# Patient Record
Sex: MALE | Race: WHITE | NOT HISPANIC OR LATINO | Employment: FULL TIME | URBAN - METROPOLITAN AREA
[De-identification: names, ages, dates, MRNs, and addresses within clinical notes are randomized per-mention and may not be internally consistent; named-entity substitution may affect disease eponyms.]

---

## 2022-08-22 ENCOUNTER — OFFICE VISIT (OUTPATIENT)
Dept: URGENT CARE | Facility: PHYSICIAN GROUP | Age: 52
End: 2022-08-22
Payer: COMMERCIAL

## 2022-08-22 VITALS
HEIGHT: 66 IN | SYSTOLIC BLOOD PRESSURE: 106 MMHG | BODY MASS INDEX: 21.21 KG/M2 | OXYGEN SATURATION: 96 % | TEMPERATURE: 97.9 F | HEART RATE: 74 BPM | RESPIRATION RATE: 16 BRPM | WEIGHT: 132 LBS | DIASTOLIC BLOOD PRESSURE: 70 MMHG

## 2022-08-22 DIAGNOSIS — S39.012A ACUTE MYOFASCIAL STRAIN OF LUMBAR REGION, INITIAL ENCOUNTER: ICD-10-CM

## 2022-08-22 DIAGNOSIS — M54.50 LUMBAR PAIN: ICD-10-CM

## 2022-08-22 DIAGNOSIS — R31.9 HEMATURIA, UNSPECIFIED TYPE: ICD-10-CM

## 2022-08-22 LAB
APPEARANCE UR: CLEAR
BILIRUB UR STRIP-MCNC: NORMAL MG/DL
COLOR UR AUTO: NORMAL
GLUCOSE UR STRIP.AUTO-MCNC: NEGATIVE MG/DL
KETONES UR STRIP.AUTO-MCNC: 15 MG/DL
LEUKOCYTE ESTERASE UR QL STRIP.AUTO: NEGATIVE
NITRITE UR QL STRIP.AUTO: NEGATIVE
PH UR STRIP.AUTO: 6 [PH] (ref 5–8)
PROT UR QL STRIP: 100 MG/DL
RBC UR QL AUTO: NORMAL
SP GR UR STRIP.AUTO: 1.02
UROBILINOGEN UR STRIP-MCNC: 4 MG/DL

## 2022-08-22 PROCEDURE — 99204 OFFICE O/P NEW MOD 45 MIN: CPT | Performed by: PHYSICIAN ASSISTANT

## 2022-08-22 PROCEDURE — 81002 URINALYSIS NONAUTO W/O SCOPE: CPT | Performed by: PHYSICIAN ASSISTANT

## 2022-08-22 RX ORDER — CYCLOBENZAPRINE HCL 10 MG
10 TABLET ORAL 3 TIMES DAILY PRN
Qty: 30 TABLET | Refills: 0 | Status: SHIPPED | OUTPATIENT
Start: 2022-08-22 | End: 2022-09-20

## 2022-08-22 RX ORDER — KETOROLAC TROMETHAMINE 30 MG/ML
30 INJECTION, SOLUTION INTRAMUSCULAR; INTRAVENOUS ONCE
Status: COMPLETED | OUTPATIENT
Start: 2022-08-22 | End: 2022-08-22

## 2022-08-22 RX ORDER — PREDNISONE 10 MG/1
30 TABLET ORAL 2 TIMES DAILY
Qty: 30 TABLET | Refills: 0 | Status: SHIPPED | OUTPATIENT
Start: 2022-08-22 | End: 2022-08-27

## 2022-08-22 RX ADMIN — KETOROLAC TROMETHAMINE 30 MG: 30 INJECTION, SOLUTION INTRAMUSCULAR; INTRAVENOUS at 10:15

## 2022-08-22 ASSESSMENT — ENCOUNTER SYMPTOMS
RESPIRATORY NEGATIVE: 1
BACK PAIN: 1
NEUROLOGICAL NEGATIVE: 1
CONSTITUTIONAL NEGATIVE: 1
CARDIOVASCULAR NEGATIVE: 1
GASTROINTESTINAL NEGATIVE: 1

## 2022-08-22 NOTE — PROGRESS NOTES
"  Subjective:     Yonis Ward  is a 52 y.o. male who presents for Spasms (Back pain since 1 wk )       He presents today with low back pain x1 week.  Initially he was experiencing lumbar radiculopathy but his radicular symptoms have resolved after visiting the chiropractor; his low back pain has continued.  He presents today using a walking stick for ambulatory assistance.  He denies loss of bowel or bladder function.  Denies any specific injury or trauma associated with symptom onset.  He has been using over-the-counter Tylenol for his symptoms.  No previous history of lumbar pain or injury.  No urinary symptoms, flank pain, gross hematuria.  No numbness and tingling or loss of lower extremity muscle strength.     Review of Systems   Constitutional: Negative.    Respiratory: Negative.     Cardiovascular: Negative.    Gastrointestinal: Negative.    Genitourinary: Negative.    Musculoskeletal:  Positive for back pain.   Neurological: Negative.     No Known Allergies  History reviewed. No pertinent past medical history.     Objective:   /70 (BP Location: Left arm, Patient Position: Sitting, BP Cuff Size: Adult)   Pulse 74   Temp 36.6 °C (97.9 °F) (Temporal)   Resp 16   Ht 1.676 m (5' 6\")   Wt 59.9 kg (132 lb)   SpO2 96%   BMI 21.31 kg/m²   Physical Exam  Vitals and nursing note reviewed.   Constitutional:       General: He is not in acute distress.     Appearance: He is not ill-appearing or toxic-appearing.   HENT:      Head: Normocephalic.      Nose: No rhinorrhea.   Eyes:      General: No scleral icterus.     Conjunctiva/sclera: Conjunctivae normal.   Pulmonary:      Effort: Pulmonary effort is normal. No respiratory distress.      Breath sounds: No stridor.   Musculoskeletal:      Cervical back: Neck supple.      Comments: Tenderness to palpation over the bilateral SI joints, tenderness over the bilateral lumbar paraspinal musculature.  No bony step-offs or deformities.  Positive straight leg " raise, likely due to severe low back pain rather than disc pathology.  Lower extremity dermatomal sensation intact.  Lower extremity strength 4/5 overall muscles.   Neurological:      Mental Status: He is alert and oriented to person, place, and time.   Psychiatric:         Mood and Affect: Mood normal.         Behavior: Behavior normal.         Thought Content: Thought content normal.         Judgment: Judgment normal.           Diagnostic testing:    Urinalysis-large bilirubin, positive ketones, trace blood, positive protein, positive urobilirubin, all others within normal limits    Assessment/Plan:     Encounter Diagnoses   Name Primary?    Lumbar pain     Acute myofascial strain of lumbar region, initial encounter     Hematuria, unspecified type           Plan for care for today's complaint includes 30 mg Toradol injection in office today.  Prednisone, Flexeril.  He should continue to monitor symptoms return to the urgent care follow-up emergency department if symptoms worsen, will refer to primary care for further evaluation management of his acute low back pain and hematuria seen on urinalysis..  Prescription for prednisone, Flexeril provided.    See AVS Instructions below for written guidance provided to patient on after-visit management and care in addition to our verbal discussion during the visit.    Please note that this dictation was created using voice recognition software. I have attempted to correct all errors, but there may be sound-alike, spelling, grammar and possibly content errors that I did not discover before finalizing the note.    Dhruv Medina PA-C

## 2022-08-29 ENCOUNTER — OFFICE VISIT (OUTPATIENT)
Dept: URGENT CARE | Facility: PHYSICIAN GROUP | Age: 52
End: 2022-08-29
Payer: COMMERCIAL

## 2022-08-29 VITALS
HEART RATE: 85 BPM | HEIGHT: 66 IN | DIASTOLIC BLOOD PRESSURE: 60 MMHG | TEMPERATURE: 98 F | SYSTOLIC BLOOD PRESSURE: 112 MMHG | BODY MASS INDEX: 21.69 KG/M2 | WEIGHT: 135 LBS | RESPIRATION RATE: 16 BRPM | OXYGEN SATURATION: 99 %

## 2022-08-29 DIAGNOSIS — M70.21 OLECRANON BURSITIS OF RIGHT ELBOW: ICD-10-CM

## 2022-08-29 DIAGNOSIS — M54.50 LUMBAR PAIN: ICD-10-CM

## 2022-08-29 PROCEDURE — 99214 OFFICE O/P EST MOD 30 MIN: CPT | Performed by: PHYSICIAN ASSISTANT

## 2022-08-29 RX ORDER — IBUPROFEN 200 MG
200 TABLET ORAL EVERY 6 HOURS PRN
COMMUNITY
End: 2022-10-09

## 2022-08-29 RX ORDER — CEPHALEXIN 500 MG/1
500 CAPSULE ORAL 4 TIMES DAILY
Qty: 28 CAPSULE | Refills: 0 | Status: SHIPPED | OUTPATIENT
Start: 2022-08-29 | End: 2022-09-05

## 2022-08-29 RX ORDER — METHYLPREDNISOLONE 4 MG/1
TABLET ORAL
Qty: 21 TABLET | Refills: 0 | Status: SHIPPED | OUTPATIENT
Start: 2022-08-29 | End: 2022-09-20

## 2022-08-29 ASSESSMENT — ENCOUNTER SYMPTOMS
CHILLS: 0
FEVER: 0
PALPITATIONS: 0
SHORTNESS OF BREATH: 0

## 2022-08-29 NOTE — PROGRESS NOTES
"  Subjective:   Yonis Ward is a 52 y.o. male who presents today with   Chief Complaint   Patient presents with    Arm Swelling     On (R) arm x 1 day     Arm Pain   The incident occurred 12 to 24 hours ago. There was no injury mechanism. The pain is present in the right elbow. The quality of the pain is described as aching. The pain is moderate. Pertinent negatives include no chest pain. He has tried nothing for the symptoms. The treatment provided no relief.   No recent injury or trauma.  Patient states his back has also been bothering him and seemed to have some recurrence after being treated initially 1 week ago.  He denies any significant previous back injuries besides strains and denies any sciatica at this time or other red flag signs.    PMH:  has no past medical history on file.  MEDS:   Current Outpatient Medications:     ibuprofen (MOTRIN) 200 MG Tab, Take 200 mg by mouth every 6 hours as needed., Disp: , Rfl:     cephALEXin (KEFLEX) 500 MG Cap, Take 1 Capsule by mouth 4 times a day for 7 days., Disp: 28 Capsule, Rfl: 0    cyclobenzaprine (FLEXERIL) 10 mg Tab, Take 1 Tablet by mouth 3 times a day as needed for Muscle Spasms. (Patient not taking: Reported on 8/29/2022), Disp: 30 Tablet, Rfl: 0  ALLERGIES: No Known Allergies  SURGHX: No past surgical history on file.  SOCHX:  reports that he has never smoked. He has never used smokeless tobacco. He reports that he does not currently use alcohol. He reports that he does not use drugs.  FH: Reviewed with patient, not pertinent to this visit.     Review of Systems   Constitutional:  Negative for chills and fever.   Respiratory:  Negative for shortness of breath.    Cardiovascular:  Negative for chest pain and palpitations.   Musculoskeletal:         Right elbow pain, swelling      Objective:   /60 (BP Location: Left arm, Patient Position: Sitting, BP Cuff Size: Adult)   Pulse 85   Temp 36.7 °C (98 °F) (Temporal)   Resp 16   Ht 1.676 m (5' 6\") "   Wt 61.2 kg (135 lb)   SpO2 99%   BMI 21.79 kg/m²   Physical Exam  Vitals and nursing note reviewed.   Constitutional:       General: He is not in acute distress.     Appearance: Normal appearance. He is well-developed. He is not ill-appearing or toxic-appearing.   HENT:      Head: Normocephalic and atraumatic.      Right Ear: Hearing normal.      Left Ear: Hearing normal.   Cardiovascular:      Rate and Rhythm: Normal rate and regular rhythm.      Heart sounds: Normal heart sounds.   Pulmonary:      Effort: Pulmonary effort is normal.   Musculoskeletal:        Arms:         Back:       Comments: No bony tenderness to palpation of the right elbow.  Limited range of motion with flexion secondary to swelling.  Patient has full extension of the right elbow.  Neurovascular intact distally to the right upper extremity.  No tenderness to palpation or step-off deformity appreciated to the lumbar area but patient notes that his pain is to the mid/right lumbar area decreased range of motion of flexion and twisting at the hips.   Skin:     General: Skin is warm and dry.   Neurological:      Mental Status: He is alert.      Coordination: Coordination normal.   Psychiatric:         Mood and Affect: Mood normal.     Assessment/Plan:   Assessment    1. Olecranon bursitis of right elbow  - Referral to Sports Medicine  - cephALEXin (KEFLEX) 500 MG Cap; Take 1 Capsule by mouth 4 times a day for 7 days.  Dispense: 28 Capsule; Refill: 0    2. Lumbar pain  - Referral to Sports Medicine    Other orders  - ibuprofen (MOTRIN) 200 MG Tab; Take 200 mg by mouth every 6 hours as needed.  Symptoms and presentation are consistent with bursitis of the right elbow and given erythema that is present would recommend treating with antibiotics as well in case of any superficial infection.  Did discuss close monitoring and ER precautions that if there is any new worsening of redness or swelling or streaking from the area I would recommend going to  the ER at that time.  Recommend patient follow-up with sports medicine regarding his lower back pain and possibly needing physical therapy and other treatment.  Patient was provided with an Ace wrap that I recommend he use on and off over the next several days to help with swelling and also suggest that he use ice or heat to the area as well.    Differential diagnosis, natural history, supportive care, and indications for immediate follow-up discussed.   Patient given instructions and understanding of medications and treatment.    If not improving in 3-5 days, F/U with PCP or return to  if symptoms worsen.    Patient agreeable to plan.    Please note that this dictation was created using voice recognition software. I have made every reasonable attempt to correct obvious errors, but I expect that there are errors of grammar and possibly content that I did not discover before finalizing the note.    Aurelio Anthony PA-C

## 2022-09-20 ENCOUNTER — OFFICE VISIT (OUTPATIENT)
Dept: MEDICAL GROUP | Facility: PHYSICIAN GROUP | Age: 52
End: 2022-09-20
Attending: PHYSICIAN ASSISTANT
Payer: COMMERCIAL

## 2022-09-20 VITALS
TEMPERATURE: 99.1 F | SYSTOLIC BLOOD PRESSURE: 136 MMHG | BODY MASS INDEX: 20.83 KG/M2 | WEIGHT: 129.6 LBS | OXYGEN SATURATION: 98 % | RESPIRATION RATE: 18 BRPM | HEART RATE: 73 BPM | HEIGHT: 66 IN | DIASTOLIC BLOOD PRESSURE: 78 MMHG

## 2022-09-20 DIAGNOSIS — M62.830 BACK SPASM: ICD-10-CM

## 2022-09-20 PROBLEM — Z00.00 WELLNESS EXAMINATION: Status: ACTIVE | Noted: 2022-09-20

## 2022-09-20 PROCEDURE — 99203 OFFICE O/P NEW LOW 30 MIN: CPT | Performed by: FAMILY MEDICINE

## 2022-09-20 RX ORDER — CYCLOBENZAPRINE HCL 5 MG
5-10 TABLET ORAL 3 TIMES DAILY PRN
Qty: 30 TABLET | Refills: 0 | Status: SHIPPED | OUTPATIENT
Start: 2022-09-20 | End: 2022-10-09

## 2022-09-20 RX ORDER — CYCLOBENZAPRINE HCL 5 MG
5-10 TABLET ORAL 3 TIMES DAILY PRN
Qty: 30 TABLET | Refills: 0 | Status: SHIPPED | OUTPATIENT
Start: 2022-09-20 | End: 2022-09-20

## 2022-09-20 RX ORDER — NAPROXEN 500 MG/1
500 TABLET ORAL 2 TIMES DAILY WITH MEALS
Qty: 60 TABLET | Refills: 1 | Status: SHIPPED | OUTPATIENT
Start: 2022-09-20 | End: 2022-10-09

## 2022-09-20 SDOH — ECONOMIC STABILITY: TRANSPORTATION INSECURITY
IN THE PAST 12 MONTHS, HAS THE LACK OF TRANSPORTATION KEPT YOU FROM MEDICAL APPOINTMENTS OR FROM GETTING MEDICATIONS?: NO

## 2022-09-20 SDOH — ECONOMIC STABILITY: HOUSING INSECURITY
IN THE LAST 12 MONTHS, WAS THERE A TIME WHEN YOU DID NOT HAVE A STEADY PLACE TO SLEEP OR SLEPT IN A SHELTER (INCLUDING NOW)?: YES

## 2022-09-20 SDOH — HEALTH STABILITY: PHYSICAL HEALTH: ON AVERAGE, HOW MANY DAYS PER WEEK DO YOU ENGAGE IN MODERATE TO STRENUOUS EXERCISE (LIKE A BRISK WALK)?: 5 DAYS

## 2022-09-20 SDOH — ECONOMIC STABILITY: HOUSING INSECURITY: IN THE LAST 12 MONTHS, HOW MANY PLACES HAVE YOU LIVED?: 1

## 2022-09-20 SDOH — ECONOMIC STABILITY: TRANSPORTATION INSECURITY
IN THE PAST 12 MONTHS, HAS LACK OF TRANSPORTATION KEPT YOU FROM MEETINGS, WORK, OR FROM GETTING THINGS NEEDED FOR DAILY LIVING?: NO

## 2022-09-20 SDOH — ECONOMIC STABILITY: INCOME INSECURITY: HOW HARD IS IT FOR YOU TO PAY FOR THE VERY BASICS LIKE FOOD, HOUSING, MEDICAL CARE, AND HEATING?: NOT VERY HARD

## 2022-09-20 SDOH — ECONOMIC STABILITY: HOUSING INSECURITY
IN THE LAST 12 MONTHS, WAS THERE A TIME WHEN YOU DID NOT HAVE A STEADY PLACE TO SLEEP OR SLEPT IN A SHELTER (INCLUDING NOW)?: NO

## 2022-09-20 SDOH — ECONOMIC STABILITY: FOOD INSECURITY: WITHIN THE PAST 12 MONTHS, YOU WORRIED THAT YOUR FOOD WOULD RUN OUT BEFORE YOU GOT MONEY TO BUY MORE.: NEVER TRUE

## 2022-09-20 SDOH — ECONOMIC STABILITY: INCOME INSECURITY: IN THE LAST 12 MONTHS, WAS THERE A TIME WHEN YOU WERE NOT ABLE TO PAY THE MORTGAGE OR RENT ON TIME?: NO

## 2022-09-20 SDOH — ECONOMIC STABILITY: TRANSPORTATION INSECURITY
IN THE PAST 12 MONTHS, HAS LACK OF RELIABLE TRANSPORTATION KEPT YOU FROM MEDICAL APPOINTMENTS, MEETINGS, WORK OR FROM GETTING THINGS NEEDED FOR DAILY LIVING?: NO

## 2022-09-20 SDOH — ECONOMIC STABILITY: FOOD INSECURITY: WITHIN THE PAST 12 MONTHS, THE FOOD YOU BOUGHT JUST DIDN'T LAST AND YOU DIDN'T HAVE MONEY TO GET MORE.: NEVER TRUE

## 2022-09-20 SDOH — HEALTH STABILITY: PHYSICAL HEALTH: ON AVERAGE, HOW MANY MINUTES DO YOU ENGAGE IN EXERCISE AT THIS LEVEL?: 60 MIN

## 2022-09-20 SDOH — HEALTH STABILITY: MENTAL HEALTH
STRESS IS WHEN SOMEONE FEELS TENSE, NERVOUS, ANXIOUS, OR CAN'T SLEEP AT NIGHT BECAUSE THEIR MIND IS TROUBLED. HOW STRESSED ARE YOU?: TO SOME EXTENT

## 2022-09-20 ASSESSMENT — SOCIAL DETERMINANTS OF HEALTH (SDOH)
HOW HARD IS IT FOR YOU TO PAY FOR THE VERY BASICS LIKE FOOD, HOUSING, MEDICAL CARE, AND HEATING?: NOT VERY HARD
HOW OFTEN DO YOU GET TOGETHER WITH FRIENDS OR RELATIVES?: TWICE A WEEK
HOW OFTEN DO YOU HAVE A DRINK CONTAINING ALCOHOL: MONTHLY OR LESS
WITHIN THE PAST 12 MONTHS, YOU WORRIED THAT YOUR FOOD WOULD RUN OUT BEFORE YOU GOT THE MONEY TO BUY MORE: NEVER TRUE
HOW OFTEN DO YOU ATTENT MEETINGS OF THE CLUB OR ORGANIZATION YOU BELONG TO?: NEVER
IN A TYPICAL WEEK, HOW MANY TIMES DO YOU TALK ON THE PHONE WITH FAMILY, FRIENDS, OR NEIGHBORS?: THREE TIMES A WEEK
HOW OFTEN DO YOU ATTEND CHURCH OR RELIGIOUS SERVICES?: MORE THAN 4 TIMES PER YEAR
HOW OFTEN DO YOU GET TOGETHER WITH FRIENDS OR RELATIVES?: TWICE A WEEK
HOW MANY DRINKS CONTAINING ALCOHOL DO YOU HAVE ON A TYPICAL DAY WHEN YOU ARE DRINKING: 1 OR 2
IN A TYPICAL WEEK, HOW MANY TIMES DO YOU TALK ON THE PHONE WITH FAMILY, FRIENDS, OR NEIGHBORS?: THREE TIMES A WEEK
HOW OFTEN DO YOU ATTENT MEETINGS OF THE CLUB OR ORGANIZATION YOU BELONG TO?: NEVER
HOW OFTEN DO YOU ATTEND CHURCH OR RELIGIOUS SERVICES?: MORE THAN 4 TIMES PER YEAR
HOW OFTEN DO YOU HAVE SIX OR MORE DRINKS ON ONE OCCASION: NEVER
DO YOU BELONG TO ANY CLUBS OR ORGANIZATIONS SUCH AS CHURCH GROUPS UNIONS, FRATERNAL OR ATHLETIC GROUPS, OR SCHOOL GROUPS?: NO
DO YOU BELONG TO ANY CLUBS OR ORGANIZATIONS SUCH AS CHURCH GROUPS UNIONS, FRATERNAL OR ATHLETIC GROUPS, OR SCHOOL GROUPS?: NO

## 2022-09-20 ASSESSMENT — LIFESTYLE VARIABLES
HOW OFTEN DO YOU HAVE A DRINK CONTAINING ALCOHOL: MONTHLY OR LESS
AUDIT-C TOTAL SCORE: 1
SKIP TO QUESTIONS 9-10: 1
HOW MANY STANDARD DRINKS CONTAINING ALCOHOL DO YOU HAVE ON A TYPICAL DAY: 1 OR 2
HOW OFTEN DO YOU HAVE SIX OR MORE DRINKS ON ONE OCCASION: NEVER

## 2022-09-20 ASSESSMENT — PATIENT HEALTH QUESTIONNAIRE - PHQ9
SUM OF ALL RESPONSES TO PHQ QUESTIONS 1-9: 9
5. POOR APPETITE OR OVEREATING: 1 - SEVERAL DAYS
CLINICAL INTERPRETATION OF PHQ2 SCORE: 2

## 2022-09-20 NOTE — PROGRESS NOTES
Subjective:     CC: Here to establish care and for low back muscle spasm.    HPI:   Yonis presents today with the following medical concerns:    Back spasm  This is a new problem.  Patient's been having recurrent low back spasms for the last month.  He was seen in urgent care and given a Medrol Dosepak ibuprofen which did not help.  He has not had any recent injury that he knows of he just woke up with the pain.  He did see his chiropractor and that helped for short amount of time.  His mattress is new.  No radicular symptoms.    Although he did say 1 day he had red color to his urine but that has not recurred.  He does state that his urine was very concentrated and just a small amount on that day.    History reviewed. No pertinent past medical history.    Social History     Tobacco Use    Smoking status: Former     Types: Cigarettes     Quit date: 2018     Years since quittin.0    Smokeless tobacco: Never   Vaping Use    Vaping Use: Never used   Substance Use Topics    Alcohol use: Not Currently    Drug use: Never       Current Outpatient Medications Ordered in Epic   Medication Sig Dispense Refill    cyclobenzaprine (FLEXERIL) 5 mg tablet Take 1-2 Tablets by mouth 3 times a day as needed for Muscle Spasms. 30 Tablet 0    naproxen (NAPROSYN) 500 MG Tab Take 1 Tablet by mouth 2 times a day with meals. 60 Tablet 1    ibuprofen (MOTRIN) 200 MG Tab Take 200 mg by mouth every 6 hours as needed.       No current Epic-ordered facility-administered medications on file.       Allergies:  Patient has no known allergies.    Health Maintenance: Completed    ROS:  Gen: no fevers/chills, no changes in weight  Eyes: no changes in vision  ENT: no sore throat, no hearing loss, no bloody nose  Pulm: no sob, no cough  CV: no chest pain, no palpitations  GI: no nausea/vomiting, no diarrhea  : no dysuria  Skin: no rash  Neuro: no headaches, no numbness/tingling  Heme/Lymph: no easy bruising      Objective:       Exam:  BP  "136/78 (BP Location: Right arm, Patient Position: Sitting, BP Cuff Size: Adult)   Pulse 73   Temp 37.3 °C (99.1 °F) (Temporal)   Resp 18   Ht 1.676 m (5' 6\")   Wt 58.8 kg (129 lb 9.6 oz)   SpO2 98%   BMI 20.92 kg/m²  Body mass index is 20.92 kg/m².    Gen: Alert and oriented.  Mild apparent distress from low back pain.  Ext: No clubbing, cyanosis, edema.  Back:   Patient holds his posture very stiff and has increasing pain to his lower back with any range of motion.  He has moderate to severe spasm to the lower paraspinous muscular area.  No radicular symptoms noted.  Gait is normal.  No obvious muscle weakness.        Assessment & Plan:     52 y.o. male with the following -     1. Back spasm  This is a new problem over the last month.  Patient is taken the day off of work today.  We will give him a muscle relaxant and Naprosyn.  Continue heat to the painful area.  If he is not improving in the next few days he is to let me know.    He is also encouraged to come back for a complete physical exam and baseline labs.      Return in about 4 weeks (around 10/18/2022) for annual exam.    Please note that this dictation was created using voice recognition software. I have made every reasonable attempt to correct obvious errors, but I expect that there are errors of grammar and possibly content that I did not discover before finalizing the note.        "

## 2022-09-20 NOTE — ASSESSMENT & PLAN NOTE
This is a new problem.  Patient's been having recurrent low back spasms for the last month.  He was seen in urgent care and given a Medrol Dosepak ibuprofen which did not help.  He has not had any recent injury that he knows of he just woke up with the pain.  He did see his chiropractor and that helped for short amount of time.  His mattress is new.  No radicular symptoms.    Although he did say 1 day he had red color to his urine but that has not recurred.  He does state that his urine was very concentrated and just a small amount on that day.

## 2022-10-09 ENCOUNTER — OFFICE VISIT (OUTPATIENT)
Dept: URGENT CARE | Facility: CLINIC | Age: 52
End: 2022-10-09
Payer: COMMERCIAL

## 2022-10-09 VITALS
RESPIRATION RATE: 16 BRPM | HEART RATE: 75 BPM | SYSTOLIC BLOOD PRESSURE: 150 MMHG | DIASTOLIC BLOOD PRESSURE: 90 MMHG | BODY MASS INDEX: 19.65 KG/M2 | HEIGHT: 66 IN | WEIGHT: 122.3 LBS | OXYGEN SATURATION: 98 % | TEMPERATURE: 98.4 F

## 2022-10-09 DIAGNOSIS — S39.012D STRAIN OF LUMBAR REGION, SUBSEQUENT ENCOUNTER: ICD-10-CM

## 2022-10-09 PROCEDURE — 99214 OFFICE O/P EST MOD 30 MIN: CPT | Mod: 25 | Performed by: STUDENT IN AN ORGANIZED HEALTH CARE EDUCATION/TRAINING PROGRAM

## 2022-10-09 RX ORDER — IBUPROFEN 800 MG/1
800 TABLET ORAL EVERY 8 HOURS PRN
Qty: 30 TABLET | Refills: 0 | Status: SHIPPED | OUTPATIENT
Start: 2022-10-09 | End: 2023-01-15

## 2022-10-09 RX ORDER — LIDOCAINE 4 G/G
PATCH TOPICAL
Qty: 15 PATCH | Refills: 0 | Status: ON HOLD | OUTPATIENT
Start: 2022-10-09 | End: 2022-10-26

## 2022-10-09 RX ORDER — METHOCARBAMOL 500 MG/1
TABLET, FILM COATED ORAL
Qty: 90 TABLET | Refills: 0 | Status: SHIPPED | OUTPATIENT
Start: 2022-10-09 | End: 2022-11-20

## 2022-10-09 RX ORDER — KETOROLAC TROMETHAMINE 30 MG/ML
30 INJECTION, SOLUTION INTRAMUSCULAR; INTRAVENOUS ONCE
Status: COMPLETED | OUTPATIENT
Start: 2022-10-09 | End: 2022-10-09

## 2022-10-09 RX ADMIN — KETOROLAC TROMETHAMINE 30 MG: 30 INJECTION, SOLUTION INTRAMUSCULAR; INTRAVENOUS at 11:30

## 2022-10-09 ASSESSMENT — PAIN SCALES - GENERAL: PAINLEVEL: 8=MODERATE-SEVERE PAIN

## 2022-10-09 NOTE — PROGRESS NOTES
Subjective:   CHIEF COMPLAINT  Chief Complaint   Patient presents with    Back Pain     Lower back X 1 am today; back issues since 2022       HPI  Yonis Ward is a 52 y.o. male who presents with a chief complaint of bilateral low back pain.  This is been a chronic issue since August, 2 months ago.  No specific trauma or inciting incident.  Symptoms have been intermittent however slowly worsened 2 days ago.  Then patient reports he woke up around 1:00 this morning with worsening back pain.  Right side is worse than his left.  Believes sleeping on a stiff bed is worsening his symptoms.  Symptoms are constant aggravated with any range of motion.  He has tried hot baths, ibuprofen, naproxen, cold packs which have not helped.  He has tried Flexeril, uncertain if this is provided symptomatic relief but does help him sleep throughout the night.  He was given a shot of Toradol 2 months ago which did help.  Denies experiencing any radiculopathy.  No spontaneous loss of bowels or bladder.  No saddle anesthesia.  No hematuria or dysuria.  Positive ROS for unintended weight loss.  Patient says he has had a diminished appetite due to the back pain.    REVIEW OF SYSTEMS  General: no fever or chills  GI: no nausea or vomiting  See HPI for further details.    PAST MEDICAL HISTORY       SURGICAL HISTORY  patient denies any surgical history    ALLERGIES  No Known Allergies    CURRENT MEDICATIONS  Home Medications       Reviewed by Huang Garcia D.O. (Physician) on 10/09/22 at 1105  Med List Status: <None>     Medication Last Dose Status   cyclobenzaprine (FLEXERIL) 5 mg tablet PRN Active   ibuprofen (MOTRIN) 200 MG Tab PRN Active   naproxen (NAPROSYN) 500 MG Tab PRN Active                    SOCIAL HISTORY  Social History     Tobacco Use    Smoking status: Former     Types: Cigarettes     Quit date: 2018     Years since quittin.0    Smokeless tobacco: Never   Vaping Use    Vaping Use: Never used  "  Substance and Sexual Activity    Alcohol use: Not Currently    Drug use: Never    Sexual activity: Not Currently       FAMILY HISTORY  Family History   Problem Relation Age of Onset    Hypertension Mother     Heart Disease Father           Objective:   PHYSICAL EXAM  VITAL SIGNS: BP (!) 150/90 (BP Location: Left arm, Patient Position: Sitting, BP Cuff Size: Adult)   Pulse 75   Temp 36.9 °C (98.4 °F) (Temporal)   Resp 16   Ht 1.676 m (5' 6\")   Wt 55.5 kg (122 lb 4.8 oz)   SpO2 98%   BMI 19.74 kg/m²     Gen: no acute distress, normal voice  Psych: normal affect, normal judgement, alert, awake  Skin: dry, intact, moist mucosal membranes  Lungs: CTAB w/ symmetric expansion  CV: RRR w/o murmurs or clicks  MSK: Lumbar spine: Antalgic gait.  Slow to get up from a seated position with moderate discernible discomfort.  Able to stand on heels and toes.  Mild TTP along the right SI joint; no additional TTP or midline TTP.  Distal sensation and motor fully intact.    Assessment/Plan:     1. Strain of lumbar region, subsequent encounter  ketorolac (TORADOL) injection 30 mg    ibuprofen (MOTRIN) 800 MG Tab    Lidocaine 4 % Patch    methocarbamol (ROBAXIN) 500 MG Tab      Chronic issue with acute exacerbation.  Poorly controlled.  Patient does report he has been experiencing unintended weight loss but attributes this due to poor appetite secondary to pain.  - Toradol 30 mg IM x1 given the clinic.  No additional NSAIDs for 8 hours  - Ordered Rx for ibuprofen 800 mg 3 times daily as needed.  Instructed patient to not take any additional NSAIDs with this medication  - Tylenol/acetaminophen for additional symptomatic relief  - Ordered Rx for topical lidocaine patch  - Ordered Rx for Robaxin  - Provided a handout with home stretches and exercises which I personally reviewed and discussed with the patient.  - Instructed the patient to follow-up with his PCP.  Encourage patient to discuss getting referral to formal physical " therapy.  Additionally he was instructed to discuss the unintended weight loss with his primary care physician to determine if further eval is warranted.     Differential diagnosis, natural history, supportive care, and indications for immediate follow-up discussed. All questions answered. Patient agrees with the plan of care.    Follow-up as needed if symptoms worsen or fail to improve to PCP, Urgent care or Emergency Room.    Please note that this dictation was created using voice recognition software. I have made a reasonable attempt to correct obvious errors, but I expect that there are errors of grammar and possibly content that I did not discover before finalizing the note.

## 2022-10-11 ENCOUNTER — TELEMEDICINE (OUTPATIENT)
Dept: TELEHEALTH | Facility: TELEMEDICINE | Age: 52
End: 2022-10-11
Payer: COMMERCIAL

## 2022-10-11 VITALS — HEIGHT: 66 IN | BODY MASS INDEX: 20.09 KG/M2 | WEIGHT: 125 LBS

## 2022-10-11 DIAGNOSIS — M54.50 ACUTE LOW BACK PAIN WITHOUT SCIATICA, UNSPECIFIED BACK PAIN LATERALITY: ICD-10-CM

## 2022-10-11 PROCEDURE — 99213 OFFICE O/P EST LOW 20 MIN: CPT | Mod: 95 | Performed by: PHYSICIAN ASSISTANT

## 2022-10-11 RX ORDER — CYCLOBENZAPRINE HCL 10 MG
10 TABLET ORAL 3 TIMES DAILY PRN
Qty: 30 TABLET | Refills: 0 | Status: SHIPPED | OUTPATIENT
Start: 2022-10-11 | End: 2022-11-01

## 2022-10-11 RX ORDER — TRAMADOL HYDROCHLORIDE 50 MG/1
50 TABLET ORAL EVERY 6 HOURS PRN
Qty: 16 TABLET | Refills: 0 | Status: SHIPPED | OUTPATIENT
Start: 2022-10-11 | End: 2022-10-15

## 2022-10-11 RX ORDER — METHYLPREDNISOLONE 4 MG/1
TABLET ORAL
Qty: 21 TABLET | Refills: 0 | Status: SHIPPED | OUTPATIENT
Start: 2022-10-11 | End: 2022-11-01

## 2022-10-12 NOTE — PROGRESS NOTES
Virtual Visit: Established Patient   This visit was conducted via Zoom using secure and encrypted videoconferencing technology.   The patient was in their home in the state of [place].    The patient's identity was confirmed and verbal consent was obtained for this virtual visit.    Subjective:   CC:   Chief Complaint   Patient presents with    Back Pain     Having inflammation in back. Came in to  10/9 for this.     Yonis Ward is a 52 y.o. male presenting for evaluation and management of: Acute on chronic low back pain    Patient is seen via telemedicine with complaints of severe low back pain.  Patient reports he went to the chiropractor last night.  He awoke with severe back pain around midnight.  Pain is located to the right side of the low back pain.  No radiating leg pain, numbness, or tingling.  Works as a [employer] , does not recall a specific event.  No loss of control or b/b.  No saddle anesthesia.  States he can't get OOB due to pain.  Review of prior notes indicate that he has been seen for low back pain in the past.  He had a similar episode in September and also 1 in August.  ROS       Current medicines (including changes today)  Current Outpatient Medications   Medication Sig Dispense Refill    methylPREDNISolone (MEDROL DOSEPAK) 4 MG Tablet Therapy Pack Follow schedule on package instructions. 21 Tablet 0    cyclobenzaprine (FLEXERIL) 10 mg Tab Take 1 Tablet by mouth 3 times a day as needed for Muscle Spasms. 30 Tablet 0    traMADol (ULTRAM) 50 MG Tab Take 1 Tablet by mouth every 6 hours as needed for Severe Pain for up to 4 days. 16 Tablet 0    ibuprofen (MOTRIN) 800 MG Tab Take 1 Tablet by mouth every 8 hours as needed for Moderate Pain. 30 Tablet 0    Lidocaine 4 % Patch Apply patch to painful area. Patch may remain in place for up to 12 hours in any 24-hour period. No more than 1 patch can be used in a 24-hour period. 15 Patch 0    methocarbamol (ROBAXIN) 500 MG Tab Take 1 to 2  "tablets every 6 hours as needed for pain/muscle spasms. 90 Tablet 0     No current facility-administered medications for this visit.       Patient Active Problem List    Diagnosis Date Noted    Wellness examination 09/20/2022    Back spasm 09/20/2022        Objective:   Ht 1.676 m (5' 6\")   Wt 56.7 kg (125 lb)   BMI 20.18 kg/m²     Physical Exam:    Constitutional: Alert, obvious distress, lying in bed  Skin: No rashes in visible areas.  Eye: Round. Conjunctiva clear, lids normal. No icterus.   ENMT: Lips pink without lesions, good dentition, moist mucous membranes. Phonation normal.  Neck: No masses, no thyromegaly. Moves freely without pain.  Respiratory: Unlabored respiratory effort, no cough or audible wheeze  Psych: Alert and oriented x3, normal affect and mood.     Assessment and Plan:   The following treatment plan was discussed:     1. Acute low back pain without sciatica, unspecified back pain laterality  - methylPREDNISolone (MEDROL DOSEPAK) 4 MG Tablet Therapy Pack; Follow schedule on package instructions.  Dispense: 21 Tablet; Refill: 0  - cyclobenzaprine (FLEXERIL) 10 mg Tab; Take 1 Tablet by mouth 3 times a day as needed for Muscle Spasms.  Dispense: 30 Tablet; Refill: 0  - Referral to Spine Surgery  - traMADol (ULTRAM) 50 MG Tab; Take 1 Tablet by mouth every 6 hours as needed for Severe Pain for up to 4 days.  Dispense: 16 Tablet; Refill: 0    Follow-up: No follow-ups on file.  Patient with episodic acute on chronic low back pain  No radicular qualities, no numbness tingling weakness or bowel or bladder dysfunction  Flexeril for muscle spasms  Medrol Dosepak  May continue taking ibuprofen 1 Medrol Dosepak completed  Tramadol for severe breakthrough pain  Referral placed to spine surgery/physiatry for further work-up, patient has not had any imaging of his spine in the past.  He may benefit from physical therapy.  Return precautions discussed, currently no red flags that are obvious on telemedicine " visit

## 2022-10-19 ENCOUNTER — APPOINTMENT (OUTPATIENT)
Dept: RADIOLOGY | Facility: MEDICAL CENTER | Age: 52
DRG: 477 | End: 2022-10-19
Attending: EMERGENCY MEDICINE
Payer: COMMERCIAL

## 2022-10-19 ENCOUNTER — HOSPITAL ENCOUNTER (INPATIENT)
Facility: MEDICAL CENTER | Age: 52
LOS: 7 days | DRG: 477 | End: 2022-10-26
Attending: EMERGENCY MEDICINE | Admitting: STUDENT IN AN ORGANIZED HEALTH CARE EDUCATION/TRAINING PROGRAM
Payer: COMMERCIAL

## 2022-10-19 DIAGNOSIS — I10 HYPERTENSION, UNSPECIFIED TYPE: ICD-10-CM

## 2022-10-19 DIAGNOSIS — R63.4 WEIGHT LOSS: ICD-10-CM

## 2022-10-19 DIAGNOSIS — M46.20 OSTEOMYELITIS OF VERTEBRA (HCC): ICD-10-CM

## 2022-10-19 DIAGNOSIS — M54.50 LUMBAR BACK PAIN: ICD-10-CM

## 2022-10-19 PROBLEM — D64.9 ANEMIA: Status: ACTIVE | Noted: 2022-10-19

## 2022-10-19 PROBLEM — M54.9 INTRACTABLE BACK PAIN: Status: ACTIVE | Noted: 2022-10-19

## 2022-10-19 PROBLEM — R03.0 ELEVATED BLOOD-PRESSURE READING WITHOUT DIAGNOSIS OF HYPERTENSION: Status: ACTIVE | Noted: 2022-10-19

## 2022-10-19 LAB
ALBUMIN SERPL BCP-MCNC: 3.7 G/DL (ref 3.2–4.9)
ALBUMIN/GLOB SERPL: 0.9 G/DL
ALP SERPL-CCNC: 81 U/L (ref 30–99)
ALT SERPL-CCNC: 10 U/L (ref 2–50)
ANION GAP SERPL CALC-SCNC: 12 MMOL/L (ref 7–16)
AST SERPL-CCNC: 16 U/L (ref 12–45)
BASOPHILS # BLD AUTO: 0.2 % (ref 0–1.8)
BASOPHILS # BLD: 0.02 K/UL (ref 0–0.12)
BILIRUB SERPL-MCNC: 0.6 MG/DL (ref 0.1–1.5)
BUN SERPL-MCNC: 13 MG/DL (ref 8–22)
CALCIUM SERPL-MCNC: 8.9 MG/DL (ref 8.5–10.5)
CHLORIDE SERPL-SCNC: 99 MMOL/L (ref 96–112)
CO2 SERPL-SCNC: 23 MMOL/L (ref 20–33)
CREAT SERPL-MCNC: 0.59 MG/DL (ref 0.5–1.4)
CRP SERPL HS-MCNC: 8.96 MG/DL (ref 0–0.75)
EOSINOPHIL # BLD AUTO: 0.05 K/UL (ref 0–0.51)
EOSINOPHIL NFR BLD: 0.5 % (ref 0–6.9)
ERYTHROCYTE [DISTWIDTH] IN BLOOD BY AUTOMATED COUNT: 45.1 FL (ref 35.9–50)
GFR SERPLBLD CREATININE-BSD FMLA CKD-EPI: 118 ML/MIN/1.73 M 2
GLOBULIN SER CALC-MCNC: 3.9 G/DL (ref 1.9–3.5)
GLUCOSE SERPL-MCNC: 87 MG/DL (ref 65–99)
HCT VFR BLD AUTO: 35.8 % (ref 42–52)
HGB BLD-MCNC: 11.7 G/DL (ref 14–18)
IMM GRANULOCYTES # BLD AUTO: 0.05 K/UL (ref 0–0.11)
IMM GRANULOCYTES NFR BLD AUTO: 0.5 % (ref 0–0.9)
LACTATE SERPL-SCNC: 0.9 MMOL/L (ref 0.5–2)
LYMPHOCYTES # BLD AUTO: 0.92 K/UL (ref 1–4.8)
LYMPHOCYTES NFR BLD: 8.8 % (ref 22–41)
MCH RBC QN AUTO: 29.5 PG (ref 27–33)
MCHC RBC AUTO-ENTMCNC: 32.7 G/DL (ref 33.7–35.3)
MCV RBC AUTO: 90.4 FL (ref 81.4–97.8)
MONOCYTES # BLD AUTO: 0.7 K/UL (ref 0–0.85)
MONOCYTES NFR BLD AUTO: 6.7 % (ref 0–13.4)
NEUTROPHILS # BLD AUTO: 8.69 K/UL (ref 1.82–7.42)
NEUTROPHILS NFR BLD: 83.3 % (ref 44–72)
NRBC # BLD AUTO: 0 K/UL
NRBC BLD-RTO: 0 /100 WBC
PLATELET # BLD AUTO: 402 K/UL (ref 164–446)
PMV BLD AUTO: 7.9 FL (ref 9–12.9)
POTASSIUM SERPL-SCNC: 4 MMOL/L (ref 3.6–5.5)
PROCALCITONIN SERPL-MCNC: 0.14 NG/ML
PROT SERPL-MCNC: 7.6 G/DL (ref 6–8.2)
RBC # BLD AUTO: 3.96 M/UL (ref 4.7–6.1)
SODIUM SERPL-SCNC: 134 MMOL/L (ref 135–145)
WBC # BLD AUTO: 10.4 K/UL (ref 4.8–10.8)

## 2022-10-19 PROCEDURE — 83735 ASSAY OF MAGNESIUM: CPT

## 2022-10-19 PROCEDURE — 99223 1ST HOSP IP/OBS HIGH 75: CPT | Performed by: STUDENT IN AN ORGANIZED HEALTH CARE EDUCATION/TRAINING PROGRAM

## 2022-10-19 PROCEDURE — 87040 BLOOD CULTURE FOR BACTERIA: CPT | Mod: 91

## 2022-10-19 PROCEDURE — 96375 TX/PRO/DX INJ NEW DRUG ADDON: CPT

## 2022-10-19 PROCEDURE — 84145 PROCALCITONIN (PCT): CPT

## 2022-10-19 PROCEDURE — 84100 ASSAY OF PHOSPHORUS: CPT

## 2022-10-19 PROCEDURE — 700111 HCHG RX REV CODE 636 W/ 250 OVERRIDE (IP): Performed by: EMERGENCY MEDICINE

## 2022-10-19 PROCEDURE — 85025 COMPLETE CBC W/AUTO DIFF WBC: CPT

## 2022-10-19 PROCEDURE — 770006 HCHG ROOM/CARE - MED/SURG/GYN SEMI*

## 2022-10-19 PROCEDURE — 87077 CULTURE AEROBIC IDENTIFY: CPT

## 2022-10-19 PROCEDURE — 36415 COLL VENOUS BLD VENIPUNCTURE: CPT

## 2022-10-19 PROCEDURE — 72158 MRI LUMBAR SPINE W/O & W/DYE: CPT

## 2022-10-19 PROCEDURE — 80053 COMPREHEN METABOLIC PANEL: CPT

## 2022-10-19 PROCEDURE — 99285 EMERGENCY DEPT VISIT HI MDM: CPT

## 2022-10-19 PROCEDURE — 700117 HCHG RX CONTRAST REV CODE 255: Performed by: EMERGENCY MEDICINE

## 2022-10-19 PROCEDURE — 83605 ASSAY OF LACTIC ACID: CPT

## 2022-10-19 PROCEDURE — A9576 INJ PROHANCE MULTIPACK: HCPCS | Performed by: EMERGENCY MEDICINE

## 2022-10-19 PROCEDURE — 86140 C-REACTIVE PROTEIN: CPT

## 2022-10-19 PROCEDURE — 72100 X-RAY EXAM L-S SPINE 2/3 VWS: CPT

## 2022-10-19 RX ORDER — POLYETHYLENE GLYCOL 3350 17 G/17G
1 POWDER, FOR SOLUTION ORAL
Status: DISCONTINUED | OUTPATIENT
Start: 2022-10-19 | End: 2022-10-26 | Stop reason: HOSPADM

## 2022-10-19 RX ORDER — PROMETHAZINE HYDROCHLORIDE 25 MG/1
12.5-25 SUPPOSITORY RECTAL EVERY 4 HOURS PRN
Status: DISCONTINUED | OUTPATIENT
Start: 2022-10-19 | End: 2022-10-26 | Stop reason: HOSPADM

## 2022-10-19 RX ORDER — HYDROCODONE BITARTRATE AND ACETAMINOPHEN 5; 325 MG/1; MG/1
1-2 TABLET ORAL EVERY 6 HOURS PRN
Status: DISCONTINUED | OUTPATIENT
Start: 2022-10-19 | End: 2022-10-23

## 2022-10-19 RX ORDER — BISACODYL 10 MG
10 SUPPOSITORY, RECTAL RECTAL
Status: DISCONTINUED | OUTPATIENT
Start: 2022-10-19 | End: 2022-10-26 | Stop reason: HOSPADM

## 2022-10-19 RX ORDER — MORPHINE SULFATE 4 MG/ML
4 INJECTION INTRAVENOUS ONCE
Status: COMPLETED | OUTPATIENT
Start: 2022-10-19 | End: 2022-10-19

## 2022-10-19 RX ORDER — PROMETHAZINE HYDROCHLORIDE 25 MG/1
12.5-25 TABLET ORAL EVERY 4 HOURS PRN
Status: DISCONTINUED | OUTPATIENT
Start: 2022-10-19 | End: 2022-10-26 | Stop reason: HOSPADM

## 2022-10-19 RX ORDER — HYDROMORPHONE HYDROCHLORIDE 1 MG/ML
1 INJECTION, SOLUTION INTRAMUSCULAR; INTRAVENOUS; SUBCUTANEOUS EVERY 4 HOURS PRN
Status: DISCONTINUED | OUTPATIENT
Start: 2022-10-19 | End: 2022-10-26 | Stop reason: HOSPADM

## 2022-10-19 RX ORDER — ONDANSETRON 4 MG/1
4 TABLET, ORALLY DISINTEGRATING ORAL EVERY 4 HOURS PRN
Status: DISCONTINUED | OUTPATIENT
Start: 2022-10-19 | End: 2022-10-26 | Stop reason: HOSPADM

## 2022-10-19 RX ORDER — ACETAMINOPHEN 325 MG/1
650 TABLET ORAL EVERY 6 HOURS PRN
Status: DISCONTINUED | OUTPATIENT
Start: 2022-10-19 | End: 2022-10-26 | Stop reason: HOSPADM

## 2022-10-19 RX ORDER — LABETALOL HYDROCHLORIDE 5 MG/ML
10 INJECTION, SOLUTION INTRAVENOUS EVERY 4 HOURS PRN
Status: DISCONTINUED | OUTPATIENT
Start: 2022-10-19 | End: 2022-10-26 | Stop reason: HOSPADM

## 2022-10-19 RX ORDER — KETOROLAC TROMETHAMINE 30 MG/ML
15 INJECTION, SOLUTION INTRAMUSCULAR; INTRAVENOUS ONCE
Status: COMPLETED | OUTPATIENT
Start: 2022-10-19 | End: 2022-10-19

## 2022-10-19 RX ORDER — AMOXICILLIN 250 MG
2 CAPSULE ORAL 2 TIMES DAILY
Status: DISCONTINUED | OUTPATIENT
Start: 2022-10-20 | End: 2022-10-26 | Stop reason: HOSPADM

## 2022-10-19 RX ORDER — PROCHLORPERAZINE EDISYLATE 5 MG/ML
5-10 INJECTION INTRAMUSCULAR; INTRAVENOUS EVERY 4 HOURS PRN
Status: DISCONTINUED | OUTPATIENT
Start: 2022-10-19 | End: 2022-10-26 | Stop reason: HOSPADM

## 2022-10-19 RX ORDER — ONDANSETRON 2 MG/ML
4 INJECTION INTRAMUSCULAR; INTRAVENOUS EVERY 4 HOURS PRN
Status: DISCONTINUED | OUTPATIENT
Start: 2022-10-19 | End: 2022-10-26 | Stop reason: HOSPADM

## 2022-10-19 RX ADMIN — KETOROLAC TROMETHAMINE 15 MG: 30 INJECTION, SOLUTION INTRAMUSCULAR at 21:30

## 2022-10-19 RX ADMIN — MORPHINE SULFATE 4 MG: 4 INJECTION INTRAVENOUS at 21:51

## 2022-10-19 RX ADMIN — GADOTERIDOL 10 ML: 279.3 INJECTION, SOLUTION INTRAVENOUS at 23:08

## 2022-10-19 ASSESSMENT — LIFESTYLE VARIABLES
TOTAL SCORE: 0
HAVE YOU EVER FELT YOU SHOULD CUT DOWN ON YOUR DRINKING: NO
DO YOU DRINK ALCOHOL: NO
EVER FELT BAD OR GUILTY ABOUT YOUR DRINKING: NO
TOTAL SCORE: 0
HOW MANY TIMES IN THE PAST YEAR HAVE YOU HAD 5 OR MORE DRINKS IN A DAY: 0
HAVE PEOPLE ANNOYED YOU BY CRITICIZING YOUR DRINKING: NO
AVERAGE NUMBER OF DAYS PER WEEK YOU HAVE A DRINK CONTAINING ALCOHOL: 0
EVER HAD A DRINK FIRST THING IN THE MORNING TO STEADY YOUR NERVES TO GET RID OF A HANGOVER: NO
ON A TYPICAL DAY WHEN YOU DRINK ALCOHOL HOW MANY DRINKS DO YOU HAVE: 0
TOTAL SCORE: 0
CONSUMPTION TOTAL: NEGATIVE

## 2022-10-20 PROBLEM — G06.2 EPIDURAL ABSCESS: Status: ACTIVE | Noted: 2022-10-20

## 2022-10-20 LAB
ANION GAP SERPL CALC-SCNC: 15 MMOL/L (ref 7–16)
BASOPHILS # BLD AUTO: 0.2 % (ref 0–1.8)
BASOPHILS # BLD: 0.02 K/UL (ref 0–0.12)
BUN SERPL-MCNC: 16 MG/DL (ref 8–22)
CALCIUM SERPL-MCNC: 8.7 MG/DL (ref 8.5–10.5)
CHLORIDE SERPL-SCNC: 98 MMOL/L (ref 96–112)
CO2 SERPL-SCNC: 19 MMOL/L (ref 20–33)
CREAT SERPL-MCNC: 0.57 MG/DL (ref 0.5–1.4)
EOSINOPHIL # BLD AUTO: 0.02 K/UL (ref 0–0.51)
EOSINOPHIL NFR BLD: 0.2 % (ref 0–6.9)
ERYTHROCYTE [DISTWIDTH] IN BLOOD BY AUTOMATED COUNT: 45.7 FL (ref 35.9–50)
ERYTHROCYTE [SEDIMENTATION RATE] IN BLOOD BY WESTERGREN METHOD: 36 MM/HOUR (ref 0–20)
GFR SERPLBLD CREATININE-BSD FMLA CKD-EPI: 119 ML/MIN/1.73 M 2
GLUCOSE SERPL-MCNC: 82 MG/DL (ref 65–99)
HCT VFR BLD AUTO: 36.7 % (ref 42–52)
HGB BLD-MCNC: 11.9 G/DL (ref 14–18)
IMM GRANULOCYTES # BLD AUTO: 0.04 K/UL (ref 0–0.11)
IMM GRANULOCYTES NFR BLD AUTO: 0.4 % (ref 0–0.9)
LACTATE SERPL-SCNC: 1.2 MMOL/L (ref 0.5–2)
LYMPHOCYTES # BLD AUTO: 0.8 K/UL (ref 1–4.8)
LYMPHOCYTES NFR BLD: 8.5 % (ref 22–41)
MAGNESIUM SERPL-MCNC: 2.1 MG/DL (ref 1.5–2.5)
MCH RBC QN AUTO: 29.2 PG (ref 27–33)
MCHC RBC AUTO-ENTMCNC: 32.4 G/DL (ref 33.7–35.3)
MCV RBC AUTO: 90.2 FL (ref 81.4–97.8)
MONOCYTES # BLD AUTO: 0.63 K/UL (ref 0–0.85)
MONOCYTES NFR BLD AUTO: 6.7 % (ref 0–13.4)
NEUTROPHILS # BLD AUTO: 7.89 K/UL (ref 1.82–7.42)
NEUTROPHILS NFR BLD: 84 % (ref 44–72)
NRBC # BLD AUTO: 0 K/UL
NRBC BLD-RTO: 0 /100 WBC
PHOSPHATE SERPL-MCNC: 3.1 MG/DL (ref 2.5–4.5)
PLATELET # BLD AUTO: 370 K/UL (ref 164–446)
PMV BLD AUTO: 8.9 FL (ref 9–12.9)
POTASSIUM SERPL-SCNC: 3.9 MMOL/L (ref 3.6–5.5)
RBC # BLD AUTO: 4.07 M/UL (ref 4.7–6.1)
SODIUM SERPL-SCNC: 132 MMOL/L (ref 135–145)
WBC # BLD AUTO: 9.4 K/UL (ref 4.8–10.8)

## 2022-10-20 PROCEDURE — 96375 TX/PRO/DX INJ NEW DRUG ADDON: CPT

## 2022-10-20 PROCEDURE — 770001 HCHG ROOM/CARE - MED/SURG/GYN PRIV*

## 2022-10-20 PROCEDURE — 36415 COLL VENOUS BLD VENIPUNCTURE: CPT

## 2022-10-20 PROCEDURE — 96367 TX/PROPH/DG ADDL SEQ IV INF: CPT

## 2022-10-20 PROCEDURE — 700111 HCHG RX REV CODE 636 W/ 250 OVERRIDE (IP): Performed by: INTERNAL MEDICINE

## 2022-10-20 PROCEDURE — 85652 RBC SED RATE AUTOMATED: CPT

## 2022-10-20 PROCEDURE — 700105 HCHG RX REV CODE 258: Performed by: STUDENT IN AN ORGANIZED HEALTH CARE EDUCATION/TRAINING PROGRAM

## 2022-10-20 PROCEDURE — 96365 THER/PROPH/DIAG IV INF INIT: CPT

## 2022-10-20 PROCEDURE — 96366 THER/PROPH/DIAG IV INF ADDON: CPT

## 2022-10-20 PROCEDURE — 85025 COMPLETE CBC W/AUTO DIFF WBC: CPT

## 2022-10-20 PROCEDURE — 99233 SBSQ HOSP IP/OBS HIGH 50: CPT | Performed by: INTERNAL MEDICINE

## 2022-10-20 PROCEDURE — 700102 HCHG RX REV CODE 250 W/ 637 OVERRIDE(OP): Performed by: STUDENT IN AN ORGANIZED HEALTH CARE EDUCATION/TRAINING PROGRAM

## 2022-10-20 PROCEDURE — 700111 HCHG RX REV CODE 636 W/ 250 OVERRIDE (IP): Performed by: STUDENT IN AN ORGANIZED HEALTH CARE EDUCATION/TRAINING PROGRAM

## 2022-10-20 PROCEDURE — 83605 ASSAY OF LACTIC ACID: CPT

## 2022-10-20 PROCEDURE — 80048 BASIC METABOLIC PNL TOTAL CA: CPT

## 2022-10-20 PROCEDURE — A9270 NON-COVERED ITEM OR SERVICE: HCPCS | Performed by: STUDENT IN AN ORGANIZED HEALTH CARE EDUCATION/TRAINING PROGRAM

## 2022-10-20 RX ORDER — CALCIUM CARBONATE 500 MG/1
500 TABLET, CHEWABLE ORAL 3 TIMES DAILY PRN
Status: DISCONTINUED | OUTPATIENT
Start: 2022-10-20 | End: 2022-10-26 | Stop reason: HOSPADM

## 2022-10-20 RX ORDER — SIMETHICONE 125 MG
125 TABLET,CHEWABLE ORAL 3 TIMES DAILY PRN
Status: DISCONTINUED | OUTPATIENT
Start: 2022-10-20 | End: 2022-10-26 | Stop reason: HOSPADM

## 2022-10-20 RX ORDER — TRAMADOL HYDROCHLORIDE 50 MG/1
50 TABLET ORAL EVERY 4 HOURS PRN
Status: ON HOLD | COMMUNITY
End: 2022-10-26

## 2022-10-20 RX ADMIN — SENNOSIDES AND DOCUSATE SODIUM 2 TABLET: 50; 8.6 TABLET ORAL at 17:38

## 2022-10-20 RX ADMIN — SODIUM CHLORIDE 3 G: 900 INJECTION INTRAVENOUS at 13:20

## 2022-10-20 RX ADMIN — HYDROMORPHONE HYDROCHLORIDE 1 MG: 1 INJECTION, SOLUTION INTRAMUSCULAR; INTRAVENOUS; SUBCUTANEOUS at 10:11

## 2022-10-20 RX ADMIN — CEFTRIAXONE SODIUM 1 G: 10 INJECTION, POWDER, FOR SOLUTION INTRAVENOUS at 20:17

## 2022-10-20 RX ADMIN — VANCOMYCIN HYDROCHLORIDE 750 MG: 500 INJECTION, POWDER, LYOPHILIZED, FOR SOLUTION INTRAVENOUS at 20:25

## 2022-10-20 RX ADMIN — SODIUM CHLORIDE 3 G: 900 INJECTION INTRAVENOUS at 03:51

## 2022-10-20 RX ADMIN — SODIUM CHLORIDE 3 G: 900 INJECTION INTRAVENOUS at 17:38

## 2022-10-20 RX ADMIN — VANCOMYCIN HYDROCHLORIDE 1250 MG: 500 INJECTION, POWDER, LYOPHILIZED, FOR SOLUTION INTRAVENOUS at 04:43

## 2022-10-20 RX ADMIN — HYDROCODONE BITARTRATE AND ACETAMINOPHEN 2 TABLET: 5; 325 TABLET ORAL at 14:47

## 2022-10-20 RX ADMIN — HYDROMORPHONE HYDROCHLORIDE 1 MG: 1 INJECTION, SOLUTION INTRAMUSCULAR; INTRAVENOUS; SUBCUTANEOUS at 15:35

## 2022-10-20 RX ADMIN — HYDROMORPHONE HYDROCHLORIDE 1 MG: 1 INJECTION, SOLUTION INTRAMUSCULAR; INTRAVENOUS; SUBCUTANEOUS at 22:16

## 2022-10-20 RX ADMIN — SENNOSIDES AND DOCUSATE SODIUM 2 TABLET: 50; 8.6 TABLET ORAL at 06:42

## 2022-10-20 RX ADMIN — HYDROCODONE BITARTRATE AND ACETAMINOPHEN 2 TABLET: 5; 325 TABLET ORAL at 06:47

## 2022-10-20 ASSESSMENT — ENCOUNTER SYMPTOMS
EYE REDNESS: 0
DIZZINESS: 0
INSOMNIA: 0
DIARRHEA: 0
SENSORY CHANGE: 0
FALLS: 0
FEVER: 0
HEADACHES: 0
VOMITING: 0
PHOTOPHOBIA: 0
TREMORS: 0
BLOOD IN STOOL: 0
DOUBLE VISION: 0
EYE PAIN: 0
MYALGIAS: 0
NAUSEA: 0
BACK PAIN: 1
CONSTIPATION: 0
NERVOUS/ANXIOUS: 0
SEIZURES: 0
WHEEZING: 0
SPEECH CHANGE: 0
SHORTNESS OF BREATH: 0
BLURRED VISION: 0
PALPITATIONS: 0
TINGLING: 0
LOSS OF CONSCIOUSNESS: 0
WEAKNESS: 0
CHILLS: 0
FOCAL WEAKNESS: 0
COUGH: 0
ABDOMINAL PAIN: 0
HEMOPTYSIS: 0
CONSTIPATION: 1

## 2022-10-20 ASSESSMENT — COGNITIVE AND FUNCTIONAL STATUS - GENERAL
TOILETING: A LITTLE
TURNING FROM BACK TO SIDE WHILE IN FLAT BAD: A LITTLE
CLIMB 3 TO 5 STEPS WITH RAILING: A LOT
DRESSING REGULAR UPPER BODY CLOTHING: A LITTLE
SUGGESTED CMS G CODE MODIFIER DAILY ACTIVITY: CJ
DRESSING REGULAR LOWER BODY CLOTHING: A LITTLE
HELP NEEDED FOR BATHING: A LITTLE
MOBILITY SCORE: 13
DAILY ACTIVITIY SCORE: 20
STANDING UP FROM CHAIR USING ARMS: A LOT
MOVING TO AND FROM BED TO CHAIR: A LOT
MOVING FROM LYING ON BACK TO SITTING ON SIDE OF FLAT BED: A LOT
SUGGESTED CMS G CODE MODIFIER MOBILITY: CL
WALKING IN HOSPITAL ROOM: A LOT

## 2022-10-20 ASSESSMENT — PATIENT HEALTH QUESTIONNAIRE - PHQ9
SUM OF ALL RESPONSES TO PHQ9 QUESTIONS 1 AND 2: 0
1. LITTLE INTEREST OR PLEASURE IN DOING THINGS: NOT AT ALL
2. FEELING DOWN, DEPRESSED, IRRITABLE, OR HOPELESS: NOT AT ALL

## 2022-10-20 ASSESSMENT — PAIN DESCRIPTION - PAIN TYPE
TYPE: CHRONIC PAIN
TYPE: CHRONIC PAIN

## 2022-10-20 ASSESSMENT — FIBROSIS 4 INDEX: FIB4 SCORE: 0.68

## 2022-10-20 ASSESSMENT — LIFESTYLE VARIABLES: SUBSTANCE_ABUSE: 0

## 2022-10-20 NOTE — CARE PLAN
The patient is Watcher - Medium risk of patient condition declining or worsening         Progress made toward(s) clinical / shift goals:    PRN pain medication given per MAR. IR order placed per MD. Pt on IV abx. VS to stay wdl. Comfort and repositioning provided.     Problem: Pain - Standard  Goal: Alleviation of pain or a reduction in pain to the patient’s comfort goal  Outcome: Not Progressing     Problem: Knowledge Deficit - Standard  Goal: Patient and family/care givers will demonstrate understanding of plan of care, disease process/condition, diagnostic tests and medications  Outcome: Progressing     Problem: Skin Integrity  Goal: Skin integrity is maintained or improved  Outcome: Progressing       Patient is not progressing towards the following goals:      Problem: Pain - Standard  Goal: Alleviation of pain or a reduction in pain to the patient’s comfort goal  Outcome: Not Progressing

## 2022-10-20 NOTE — H&P
"Hospital Medicine History & Physical Note    Date of Service  10/19/2022    Primary Care Physician  Vasile Terrell III, M.D.    Consultants  neurosurgery    Specialist Names: Dr. Tinajero    Code Status  Full Code    Chief Complaint  Chief Complaint   Patient presents with    Back Pain     Severe lower back pain that has steadily gotten worse for the last two months. Denies injury, numbness/tingling. Occasional weakness down left leg. PIV placed with EMS, pt received 100mcg fentanyl and 1mg versed        History of Presenting Illness  Yonis Ward is a 52 y.o. male without any chronic medical conditions not on any long-term medications who presented 10/19/2022 with severe low back pain.  Patient first began experiencing back pain in mid August 2022.  No trauma or inciting events, he reports he just woke up one evening and his back \"locked up\" and he was in a lot of pain.  Does work as a [employer]  but again does not recall any specific inciting event.  His back pain is waxed and waned over the past 2 months from mild to severe.  He has no weakness or numbness or tingling, no saddle anesthesia, no bowel or bladder incontinence.  Denies any fevers or chills, headache or vision changes, chest pain dyspnea cough nausea or vomiting.  He has had poor p.o. intake due to ongoing back pain and he is lost approximately 20 pounds over the last few months.  He denies any spine or vertebral injections, denies IV drug use.  No history of infections or immunocompromised state.  He has tried chiropractic care, hot baths, ibuprofen, naproxen, cold packs, Flexeril, narcotics all without significant relief.  He presents because he has had acutely worsening pain over the past 2 weeks now severe impacting his ability to ambulate.    In ED he is afebrile pulse is ranged from  normal respiratory rate and room air oxygen saturation blood pressures range from 130s to 160s.  Initial work-up WBC 10.4, hemoglobin 11.7 normal " platelets, sodium 134 otherwise normal electrolytes renal function and liver function, lactic acid 0.9 lumbar x-ray showed acute discitis osteomyelitis at the L4-5 level recommend MRI scan of lumbar spine with contrast.  MRI ordered stat preliminary read shows L4-5 discitis/osteomyelitis with ventral epidural abscess.  ERP discussed case with Dr. Tinajero from neurosurgery who agrees to evaluate the patient.  Patient was started on Unasyn and vancomycin.     I discussed the plan of care with patient, bedside RN, and pharmacy.    Review of Systems  Review of Systems   Constitutional:  Negative for chills and fever.   HENT:  Negative for congestion and nosebleeds.    Eyes:  Negative for blurred vision, double vision and photophobia.   Respiratory:  Negative for cough and shortness of breath.    Cardiovascular:  Negative for chest pain and palpitations.   Gastrointestinal:  Positive for constipation. Negative for abdominal pain, diarrhea, nausea and vomiting.   Genitourinary:  Negative for dysuria, frequency and urgency.   Musculoskeletal:  Positive for back pain. Negative for falls.   Neurological:  Negative for tingling, sensory change, speech change, focal weakness and headaches.   Psychiatric/Behavioral:  Negative for substance abuse. The patient is not nervous/anxious.      Past Medical History   has no past medical history on file.    Surgical History   has no past surgical history on file.     Family History  family history includes Heart Disease in his father; Hypertension in his mother.        Social History   reports that he quit smoking about 4 years ago. His smoking use included cigarettes. He has never used smokeless tobacco. He reports that he does not currently use alcohol. He reports that he does not use drugs.    Allergies  No Known Allergies    Medications  Prior to Admission Medications   Prescriptions Last Dose Informant Patient Reported? Taking?   Lidocaine 4 % Patch >2 DAYS at [facility] Patient No No    Sig: Apply patch to painful area. Patch may remain in place for up to 12 hours in any 24-hour period. No more than 1 patch can be used in a 24-hour period.   cyclobenzaprine (FLEXERIL) 10 mg Tab 10/19/2022 at 1500 Patient No No   Sig: Take 1 Tablet by mouth 3 times a day as needed for Muscle Spasms.   ibuprofen (MOTRIN) 800 MG Tab PRN at PRN Patient No No   Sig: Take 1 Tablet by mouth every 8 hours as needed for Moderate Pain.   methocarbamol (ROBAXIN) 500 MG Tab PRN at PRN Patient No No   Sig: Take 1 to 2 tablets every 6 hours as needed for pain/muscle spasms.   methylPREDNISolone (MEDROL DOSEPAK) 4 MG Tablet Therapy Pack 10/16/2022 at [facility] Patient No No   Sig: Follow schedule on package instructions.   traMADol (ULTRAM) 50 MG Tab 10/16/2022 at unk  Yes Yes   Sig: Take 50 mg by mouth every four hours as needed for Mild Pain or Moderate Pain.      Facility-Administered Medications: None       Physical Exam  Temp:  [36.5 °C (97.7 °F)-36.7 °C (98 °F)] 36.7 °C (98 °F)  Pulse:  [] 88  Resp:  [18] 18  BP: (131-162)/(80-88) 139/82  SpO2:  [95 %-98 %] 96 %  Blood Pressure: 139/82   Temperature: 36.7 °C (98 °F)   Pulse: 88   Respiration: 18   Pulse Oximetry: 96 %       Physical Exam  Vitals and nursing note reviewed.   Constitutional:       General: He is not in acute distress.     Appearance: He is not toxic-appearing.      Comments: 52-year-old male appears younger than stated age alert and conversant able to speak full sentences without becoming breathless no acute distress while lying flat, pleasant mood   HENT:      Head: Normocephalic and atraumatic.      Nose: Nose normal. No rhinorrhea.      Mouth/Throat:      Mouth: Mucous membranes are moist.      Pharynx: Oropharynx is clear.   Eyes:      General: No scleral icterus.     Extraocular Movements: Extraocular movements intact.      Conjunctiva/sclera: Conjunctivae normal.      Pupils: Pupils are equal, round, and reactive to light.   Cardiovascular:       Rate and Rhythm: Normal rate and regular rhythm.      Pulses: Normal pulses.      Heart sounds: No murmur heard.  Pulmonary:      Effort: Pulmonary effort is normal. No respiratory distress.      Breath sounds: Normal breath sounds. No wheezing, rhonchi or rales.   Abdominal:      Palpations: Abdomen is soft.      Tenderness: There is no abdominal tenderness. There is no guarding or rebound.   Musculoskeletal:         General: Tenderness (Tenderness to the L4-L5 area) present. No deformity. Normal range of motion.      Cervical back: Normal range of motion and neck supple. No rigidity or tenderness.      Right lower leg: No edema.      Left lower leg: No edema.   Skin:     General: Skin is warm and dry.      Capillary Refill: Capillary refill takes less than 2 seconds.      Findings: No erythema or rash.   Neurological:      General: No focal deficit present.      Mental Status: He is alert and oriented to person, place, and time. Mental status is at baseline.      Cranial Nerves: No cranial nerve deficit.      Sensory: No sensory deficit.      Motor: No weakness.      Coordination: Coordination normal.      Comments: Motor 5 out of 5 throughout, sensation intact throughout, no saddle anesthesia   Psychiatric:         Mood and Affect: Mood normal.         Behavior: Behavior normal.         Thought Content: Thought content normal.         Judgment: Judgment normal.       Laboratory:  Recent Labs     10/19/22  2051 10/20/22  0155   WBC 10.4 9.4   RBC 3.96* 4.07*   HEMOGLOBIN 11.7* 11.9*   HEMATOCRIT 35.8* 36.7*   MCV 90.4 90.2   MCH 29.5 29.2   MCHC 32.7* 32.4*   RDW 45.1 45.7   PLATELETCT 402 370   MPV 7.9* 8.9*     Recent Labs     10/19/22  2051   SODIUM 134*   POTASSIUM 4.0   CHLORIDE 99   CO2 23   GLUCOSE 87   BUN 13   CREATININE 0.59   CALCIUM 8.9     Recent Labs     10/19/22  2051   ALTSGPT 10   ASTSGOT 16   ALKPHOSPHAT 81   TBILIRUBIN 0.6   GLUCOSE 87         No results for input(s): NTPROBNP in the last 72  hours.      No results for input(s): TROPONINT in the last 72 hours.    Imaging:  DX-LUMBAR SPINE-2 OR 3 VIEWS   Final Result      1.  Acute discitis/osteomyelitis at the L4-5 level. Recommend MRI scan of the lumbar spine with gadolinium enhancement for further evaluation.      2.  These findings were discussed with AKASH JOSE at 9:20 PM 10/19/2022      MR-LUMBAR SPINE-WITH & W/O    (Results Pending)       no X-Ray or EKG requiring interpretation    Assessment/Plan:  Justification for Admission Status  I anticipate this patient will require at least two midnights for appropriate medical management, necessitating inpatient admission because epidural abscess      * Osteomyelitis of vertebra (HCC)- (present on admission)  Assessment & Plan  X-ray showed acute discitis osteomyelitis to the L4-5 level  MRI with contrast showed L4-5 discitis/osteo with ventral epidural abscess on preliminary read.  On Unasyn and vancomycin.  Follow blood cultures.  CRP 8.96, check ESR.  Neurosurgery consulted.  Recommend ID consult in a.m.    Epidural abscess- (present on admission)  Assessment & Plan  MRI with contrast showed L4-5 discitis/osteomyelitis with ventral epidural abscess on preliminary read.  Follow-up official read.  ERP discussed with neurosurgery Dr. Tinajero who will evaluate patient.  Unasyn and vancomycin.  Follow blood cultures.  ID consult in a.m..  Unclear etiology no injections, no current or prior IV drug use, no history of significant infections or bacteremia.    Elevated blood-pressure reading without diagnosis of hypertension- (present on admission)  Assessment & Plan  Likely elevated secondary to pain and anxiety with new hospital admission.  IV antihypertensives ordered with parameters, if remains persistently elevated would benefit from initiating p.o. regimen.    Intractable back pain- (present on admission)  Assessment & Plan  Found to have L4-5 discitis/osteomyelitis with ventral epidural abscess on  preliminary read.  Pain control.  Follow neurosurgery recommendations.    Anemia- (present on admission)  Assessment & Plan  No reports of hemorrhage, continue to monitor.  Transfuse hemoglobin less than 7      VTE prophylaxis: SCDs/TEDs

## 2022-10-20 NOTE — PROGRESS NOTES
Radiology Prelimin Report    MRI L-spine 10/20/2022      L4-5 discitis/osteo with ventral epidural abscess.

## 2022-10-20 NOTE — PROGRESS NOTES
"Pharmacy Vancomycin Kinetics Note for 10/20/2022     52 y.o. male on Vancomycin day # 1     Vancomycin Indication (AUC Dosing): Epidural Abscess (Epidural abscess/vertebral osteomyelitis)    Provider specified end date: 10/27/22    Active Antibiotics (From admission, onward)      Ordered     Ordering Provider       Thu Oct 20, 2022  3:51 AM    10/20/22 0351  vancomycin (VANCOCIN) 750 mg in  mL IVPB  (vancomycin (VANCOCIN) IV (LD + Maintenance))  EVERY 12 HOURS         Jose Carlos Rasmussen M.D.       Thu Oct 20, 2022  3:07 AM    10/20/22 0307  ampicillin/sulbactam (UNASYN) 3 g in  mL IVPB  EVERY 6 HOURS         Jose Carlos Rasmussen M.D.    10/20/22 0307  MD Alert...Vancomycin per Pharmacy  PHARMACY TO DOSE        Question:  Indication(s) for vancomycin?  Answer:  Epidural abscess/vertebral osteomyelitis    Jose Carlos Rasmussen M.D.            Dosing Weight: 54.4 kg (119 lb 14.9 oz)      Admission History: Admitted on 10/19/2022 for Osteomyelitis of vertebra (HCC) [M46.20]  Pertinent history: pt is a 53 yo male started on unasyn and vancomycin for epidural abscess/vertebral osteomyelitis    Allergies:     Patient has no known allergies.     Pertinent cultures to date:     Results       Procedure Component Value Units Date/Time    Blood Culture [[id number]] Collected: 10/19/22 2157    Order Status: Completed Specimen: Blood from Peripheral Updated: 10/20/22 0739     Significant Indicator NEG     Source BLD     Site -     Culture Result No Growth  Note: Blood cultures are incubated for 5 days and  are monitored continuously.Positive blood cultures  are called to the RN and reported as soon as  they are identified.      Narrative:      1 of 2 for Blood Culture x 2 sites order. Per Hospital  Policy: Only change Specimen Src: to \"Line\" if specified by  physician order.  No site indicated    Blood Culture [[id number]] Collected: 10/19/22 2239    Order Status: Sent Specimen: Blood from Peripheral Updated: 10/20/22 0006    " "Narrative:      2 of 2 blood culture x2  Sites order. Per Hospital Policy:  Only change Specimen Src: to \"Line\" if specified by physician  order.            Labs:     Estimated Creatinine Clearance: 119.3 mL/min (by C-G formula based on SCr of 0.57 mg/dL).  Recent Labs     10/19/22  2051 10/20/22  0155   WBC 10.4 9.4   NEUTSPOLYS 83.30* 84.00*     Recent Labs     10/19/22  2051 10/20/22  0402   BUN 13 16   CREATININE 0.59 0.57   ALBUMIN 3.7  --        Intake/Output Summary (Last 24 hours) at 10/20/2022 1130  Last data filed at 10/20/2022 0300  Gross per 24 hour   Intake --   Output 200 ml   Net -200 ml      /68   Pulse 94   Temp 36.5 °C (97.7 °F) (Temporal)   Resp (!) 61   Ht 1.676 m (5' 6\")   Wt 54.4 kg (120 lb)   SpO2 96%  Temp (24hrs), Av.6 °C (97.8 °F), Min:36.5 °C (97.7 °F), Max:36.7 °C (98 °F)      List concerns for Vancomycin clearance:     BUN/Scr ratio greater than 20:1;Receipt of contrast dye    Pharmacokinetics:     AUC kinetics:   Ke (hr ^-1): 0.0845 hr^-1  Half life: 8.2 hr  Clearance: 2.988  Estimated TDD: 1494  Estimated Dose: 635  Estimated interval: 10.2    A/P:     -  Vancomycin dose: 750mg IV q 12 hours (0500, 1700)    -  Next vancomycin level(s):    -10/22  @ 0800   -10/22 Vt @ 1630     -  Predicted vancomycin AUC from initial AUC test calculator: 502 mg·hr/L      -  Comments: Relatively limited concerns for renal insult. Dose initiated per calculator recs as noted above. Will obtain levels at steady state to determine patient specific AUC. Given nature of infection would recommend escalation of therapy to ceftriaxone from unasyn.    David Isaac, PhT    "

## 2022-10-20 NOTE — PROGRESS NOTES
4 Eyes Skin Assessment Completed by Zonia GIBSON and Betzy GIBSON.     Head WDL  Ears WDL  Nose WDL  Mouth WDL  Neck WDL  Breast/Chest WDL  Shoulder Blades WDL  Spine WDL  (R) Arm/Elbow/Hand WDL  (L) Arm/Elbow/Hand WDL  Abdomen WDL  Groin WDL  Scrotum/Coccyx/Buttocks WDL  (R) Leg WDL  (L) Leg WDL  (R) Heel/Foot/Toe WDL  (L) Heel/Foot/Toe WDL     Devices In Places Tele Box     Interventions In Place N/A     Possible Skin Injury No     Pictures Uploaded Into Epic N/A  Wound Consult Placed N/A  RN Wound Prevention Protocol Ordered No

## 2022-10-20 NOTE — PROGRESS NOTES
"Hospital Medicine Daily Progress Note    Date of Service  10/20/2022    Chief Complaint  Yonis Ward is a 52 y.o. male admitted 10/19/2022 with Back Pain (Severe lower back pain that has steadily gotten worse for the last two months. Denies injury, numbness/tingling. Occasional weakness down left leg. PIV placed with EMS, pt received 100mcg fentanyl and 1mg versed )      Hospital Course  No notes on file  Yonis Ward is a 52 y.o. male without any chronic medical conditions not on any long-term medications who presented 10/19/2022 with severe low back pain.  Patient first began experiencing back pain in mid August 2022.  No trauma or inciting events, he reports he just woke up one evening and his back \"locked up\" and he was in a lot of pain.  Does work as a [employer]  but again does not recall any specific inciting event.  His back pain is waxed and waned over the past 2 months from mild to severe.  He has no weakness or numbness or tingling, no saddle anesthesia, no bowel or bladder incontinence.  Denies any fevers or chills, headache or vision changes, chest pain dyspnea cough nausea or vomiting.  He has had poor p.o. intake due to ongoing back pain and he is lost approximately 20 pounds over the last few months.  He denies any spine or vertebral injections, denies IV drug use.  No history of infections or immunocompromised state.  He has tried chiropractic care, hot baths, ibuprofen, naproxen, cold packs, Flexeril, narcotics all without significant relief.  He presents because he has had acutely worsening pain over the past 2 weeks now severe impacting his ability to ambulate.     In ED he is afebrile pulse is ranged from  normal respiratory rate and room air oxygen saturation blood pressures range from 130s to 160s.  Initial work-up WBC 10.4, hemoglobin 11.7 normal platelets, sodium 134 otherwise normal electrolytes renal function and liver function, lactic acid 0.9 lumbar x-ray showed " "acute discitis osteomyelitis at the L4-5 level recommend MRI scan of lumbar spine with contrast.  MRI ordered stat preliminary read shows L4-5 discitis/osteomyelitis with ventral epidural abscess.  ERP discussed case with Dr. Tinajero from neurosurgery who agrees to evaluate the patient.  Patient was started on Unasyn and vancomycin. \"    Dr. Rasmussen  Interval Problem Update  10/20. Neurosurgery recommend CT biopsy of L4-5. I called IR and ordered the consult. They would like blood cultures officially negative before the biopsy. Meanwhile patient denies saddle anesthesia, foot drops, paralysis or bowel/bladder spillage.    I have discussed this patient's plan of care and discharge plan at IDT rounds today with Case Management, Nursing, Nursing leadership, and other members of the IDT team.    Consultants/Specialty  Neurosurgery    Code Status  Full Code    Disposition  Patient is not medically cleared for discharge.   Anticipate discharge to  TBD .  I have placed the appropriate orders for post-discharge needs.    Review of Systems  Review of Systems   Constitutional:  Negative for chills and fever.   HENT:  Negative for congestion, hearing loss and nosebleeds.    Eyes:  Negative for pain and redness.   Respiratory:  Negative for cough, hemoptysis, shortness of breath and wheezing.    Cardiovascular:  Negative for chest pain and palpitations.   Gastrointestinal:  Negative for abdominal pain, blood in stool, constipation, diarrhea, nausea and vomiting.   Genitourinary:  Negative for dysuria, frequency and hematuria.   Musculoskeletal:  Positive for back pain. Negative for falls, joint pain and myalgias.   Skin:  Negative for rash.   Neurological:  Negative for dizziness, tremors, focal weakness, seizures, loss of consciousness, weakness and headaches.   Psychiatric/Behavioral:  The patient is not nervous/anxious and does not have insomnia.    All other systems reviewed and are negative.     Physical Exam  Temp:  [36.5 " °C (97.7 °F)-36.7 °C (98 °F)] 36.5 °C (97.7 °F)  Pulse:  [] 94  Resp:  [18-61] 61  BP: (118-162)/(64-88) 127/68  SpO2:  [95 %-98 %] 96 %    Physical Exam  Vitals and nursing note reviewed.   Constitutional:       Comments: Thin   HENT:      Head: Normocephalic and atraumatic.      Right Ear: External ear normal.      Left Ear: External ear normal.      Nose: Nose normal.      Mouth/Throat:      Mouth: Mucous membranes are moist.   Eyes:      General: No scleral icterus.     Conjunctiva/sclera: Conjunctivae normal.   Cardiovascular:      Rate and Rhythm: Normal rate and regular rhythm.      Heart sounds: No murmur heard.    No friction rub. No gallop.   Pulmonary:      Effort: Pulmonary effort is normal.      Breath sounds: Normal breath sounds.   Abdominal:      General: Abdomen is flat. Bowel sounds are normal. There is no distension.      Palpations: Abdomen is soft.      Tenderness: There is no abdominal tenderness. There is no guarding.   Musculoskeletal:         General: Tenderness (low back) present. Normal range of motion.      Cervical back: Normal range of motion and neck supple.   Skin:     General: Skin is warm.   Neurological:      Mental Status: He is alert and oriented to person, place, and time. Mental status is at baseline.   Psychiatric:         Mood and Affect: Mood normal.         Behavior: Behavior normal.         Thought Content: Thought content normal.         Judgment: Judgment normal.       Fluids    Intake/Output Summary (Last 24 hours) at 10/20/2022 1058  Last data filed at 10/20/2022 0300  Gross per 24 hour   Intake --   Output 200 ml   Net -200 ml       Laboratory  Recent Labs     10/19/22  2051 10/20/22  0155   WBC 10.4 9.4   RBC 3.96* 4.07*   HEMOGLOBIN 11.7* 11.9*   HEMATOCRIT 35.8* 36.7*   MCV 90.4 90.2   MCH 29.5 29.2   MCHC 32.7* 32.4*   RDW 45.1 45.7   PLATELETCT 402 370   MPV 7.9* 8.9*     Recent Labs     10/19/22  2051 10/20/22  0402   SODIUM 134* 132*   POTASSIUM 4.0 3.9  "  CHLORIDE 99 98   CO2 23 19*   GLUCOSE 87 82   BUN 13 16   CREATININE 0.59 0.57   CALCIUM 8.9 8.7                   Imaging  MR-LUMBAR SPINE-WITH & W/O   Final Result      1.  L4-5 discitis.   2.  L4 and L5 osteomyelitis.   3.  Epidural and prevertebral phlegmon at the level of L4-5.   4.  Moderate central canal stenosis at L4-5.   5.  Severe bilateral L4 and L5 degenerative neural foraminal stenosis.   6.  There is no well-defined fluid collection.      DX-LUMBAR SPINE-2 OR 3 VIEWS   Final Result      1.  Acute discitis/osteomyelitis at the L4-5 level. Recommend MRI scan of the lumbar spine with gadolinium enhancement for further evaluation.      2.  These findings were discussed with AKASH JOSE at 9:20 PM 10/19/2022      IR-CONSULT AND TREAT    (Results Pending)        Assessment/Plan  * Osteomyelitis of vertebra, epidural abscess (HCC)- (present on admission)  Assessment & Plan  X-ray showed acute discitis osteomyelitis to the L4-5 level  MRI with contrast showed L4-5 discitis/osteo with ventral epidural abscess on preliminary read.  On Unasyn and vancomycin.  Follow blood cultures.  CRP 8.96, check ESR.  Neurosurgery consulted.  Recommend ID consult in a.m.\"    Ordered IR biopsy they prefer after blood cultures are officially negative  Consult ID when we get culture data. Sooner if clinically worse  Contnue current antibiotics  Neurosurgery following.     Epidural abscess- (present on admission)  Assessment & Plan  MRI with contrast showed L4-5 discitis/osteomyelitis with ventral epidural abscess on preliminary read.  Follow-up official read.  ERP discussed with neurosurgery Dr. Tinajero who will evaluate patient.  Unasyn and vancomycin.  Follow blood cultures.  ID consult in a.m..  Unclear etiology no injections, no current or prior IV drug use, no history of significant infections or bacteremia.\"    As above    Elevated blood-pressure reading without diagnosis of hypertension- (present on " "admission)  Assessment & Plan  Likely elevated secondary to pain and anxiety with new hospital admission.  IV antihypertensives ordered with parameters, if remains persistently elevated would benefit from initiating p.o. regimen.    Intractable back pain- (present on admission)  Assessment & Plan  Found to have L4-5 discitis/osteomyelitis with ventral epidural abscess on preliminary read.  Pain control.  Follow neurosurgery recommendations.    Anemia- (present on admission)  Assessment & Plan  No reports of hemorrhage, continue to monitor.  Transfuse hemoglobin less than 7\"    Recent Labs     10/19/22  2051 10/20/22  0155   HEMOGLOBIN 11.7* 11.9*   HEMATOCRIT 35.8* 36.7*   MCV 90.4 90.2   MCH 29.5 29.2   PLATELETCT 402 370     No active bleeding       VTE prophylaxis: SCDs/TEDs    I have performed a physical exam and reviewed and updated ROS and Plan today (10/20/2022). In review of yesterday's note (10/19/2022), there are no changes except as documented above.        "

## 2022-10-20 NOTE — ED TRIAGE NOTES
Yonis Ward  52 y.o. male  Chief Complaint   Patient presents with    Back Pain     Severe lower back pain that has steadily gotten worse for the last two months. Denies injury, numbness/tingling. Occasional weakness down left leg. PIV placed with EMS, pt received 100mcg fentanyl and 1mg versed      Pt BIB EMS from home. NAD, pain is currently 1/10    Vitals:    10/19/22 2030   BP: (!) 154/88   Pulse: (!) 104   Resp: 18   Temp: 36.5 °C (97.7 °F)   SpO2: 98%

## 2022-10-20 NOTE — ASSESSMENT & PLAN NOTE
Likely elevated secondary to pain and anxiety with new hospital admission.  IV antihypertensives ordered with parameters, if remains persistently elevated would benefit from initiating p.o. regimen.

## 2022-10-20 NOTE — ASSESSMENT & PLAN NOTE
"No reports of hemorrhage, continue to monitor.  Transfuse hemoglobin less than 7\"    Recent Labs     10/19/22  2051 10/20/22  0155 10/21/22  0426   HEMOGLOBIN 11.7* 11.9* 11.6*   HEMATOCRIT 35.8* 36.7* 35.1*   MCV 90.4 90.2 89.8   MCH 29.5 29.2 29.7   PLATELETCT [phone]*     No active bleeding  "

## 2022-10-20 NOTE — ASSESSMENT & PLAN NOTE
Found to have L4-5 discitis/osteomyelitis with ventral epidural abscess on preliminary read.  Pain control.  neurosurgery recommendations.    10/24 Reported L LE pain, worsened with movement, shooting pain and numbness while standing.  Back pain better  Ordered MRI lumbar with and without contrast

## 2022-10-20 NOTE — CONSULTS
DATE OF SERVICE:  10/20/2022     NEUROSURGICAL CONSULTATION     HISTORY OF PRESENT ILLNESS:  The patient is a very pleasant 52-year-old male   who works as a [employer]  with no prior history who said that he has been   having several months of low back pain without radiation to bilateral lower   extremities.  No difficulty walking with respect to strength or numbness or   tingling, but horrible back pain.  He has had multiple presentations to urgent   care and they sent him home and is now back in the hospital having been   diagnosed with lumbar diskitis and osteomyelitis with small epidural abscess.     PAST MEDICAL HISTORY:  None.     PAST SURGICAL HISTORY:  None.     FAMILY HISTORY:  Noncontributory.     SOCIAL HISTORY:  Works as a .  No recent dental work.  No drugs.     PHYSICAL EXAMINATION:  GENERAL:  Awake, alert and oriented x3, no acute distress.  HEENT:  Pupils equal, round, reactive to light.  Extraocular muscles intact.    Tongue midline.  Face symmetric.  NEUROLOGIC:  Motor is 5/5 strength in all muscle groups in the upper and lower   extremities.  No drift.  Sensory grossly intact to light touch.     LABORATORY VALUES:  CBC significant for white count 9.4, hemoglobin 11.9,   platelets 370.  Basic metabolic panel:  Sodium 132, bicarbonate 19.  Remainder   within normal limits.  Urinalysis is negative.  CRP is 8.96, which is   elevated.  No coags have been done.  Blood cultures today are negative.     IMAGING:  MRI of the lumbar spine with and without contrast overnight shows a   lumbar 4-5 diskitis and osteomyelitis with small epidural abscess causing very   mild central canal stenosis.     PLAN:  The patient needs a CT-guided biopsy of the lumbar 4-5 disk space for   definitive cultures. No neurosurgical intervention is needed at this time   given a normal exam.  I would treat with IV antibiotics for at least 6 weeks.    ID consultation is pending.  We will sign off for now.  I want to see  him in   my office in about 6 weeks with a repeat MRI of the lumbar spine with and   without contrast.        ______________________________  Padilla Tinajero MD    CPD/MILLS    DD:  10/20/2022 09:40  DT:  10/20/2022 12:33    Job#:  [id number]

## 2022-10-20 NOTE — ED PROVIDER NOTES
ED Provider Note    ER PROVIDER NOTE          CHIEF COMPLAINT  Chief Complaint   Patient presents with    Back Pain     Severe lower back pain that has steadily gotten worse for the last two months. Denies injury, numbness/tingling. Occasional weakness down left leg. PIV placed with EMS, pt received 100mcg fentanyl and 1mg versed        HPI  Yonis Ward is a 52 y.o. male who presents to the emergency department complaining of low back pain.  Patient reports he has had low back pain over the last 2 months.  He states no real inciting incident, falls, heavy lifting or other similar mechanism.  He does work as a [employer]  so he is often lifting and getting in and out of the vehicle.  Over the last 2 months the pain seems to wax and wane in intensity although has been worse over the last week.  He reports no focal weakness numbness or tingling.  No bowel or bladder incontinence or retention.  He did have some mild constipation after stopping starting an opioid medicine for this a week ago.  He reports no fevers or chills.  No abdominal pain.  No night sweats.  He does report he has lost around 20 pounds over the last few months although he thinks this is because he is in pain he does not feel like eating.    No history of immunocompromise or IV drug use    REVIEW OF SYSTEMS  Pertinent positives include back pain. Pertinent negatives include no weakness, numbness or fevers. See HPI for details. All other systems reviewed and are negative.    PAST MEDICAL HISTORY       SURGICAL HISTORY  patient denies any surgical history    FAMILY HISTORY  Family History   Problem Relation Age of Onset    Hypertension Mother     Heart Disease Father        SOCIAL HISTORY  Social History     Socioeconomic History    Marital status:     Highest education level: Some college, no degree   Tobacco Use    Smoking status: Former     Types: Cigarettes     Quit date: 2018     Years since quittin.0    Smokeless tobacco:  "Never   Vaping Use    Vaping Use: Never used   Substance and Sexual Activity    Alcohol use: Not Currently     Comment: occ    Drug use: Never    Sexual activity: Not Currently     Social Determinants of Health     Financial Resource Strain: Low Risk     Difficulty of Paying Living Expenses: Not very hard   Food Insecurity: No Food Insecurity    Worried About Running Out of Food in the Last Year: Never true    Ran Out of Food in the Last Year: Never true   Transportation Needs: No Transportation Needs    Lack of Transportation (Medical): No    Lack of Transportation (Non-Medical): No   Physical Activity: Sufficiently Active    Days of Exercise per Week: 5 days    Minutes of Exercise per Session: 60 min   Stress: Stress Concern Present    Feeling of Stress : To some extent   Social Connections: Moderately Isolated    Frequency of Communication with Friends and Family: Three times a week    Frequency of Social Gatherings with Friends and Family: Twice a week    Attends [employer] Services: More than 4 times per year    Active Member of Clubs or Organizations: No    Attends Club or Organization Meetings: Never    Marital Status:    Housing Stability: High Risk    Unable to Pay for Housing in the Last Year: No    Number of Places Lived in the Last Year: 1    Unstable Housing in the Last Year: Yes      Social History     Substance and Sexual Activity   Drug Use Never       CURRENT MEDICATIONS  Home Medications    **Home medications have not yet been reviewed for this encounter**         ALLERGIES  No Known Allergies    PHYSICAL EXAM  VITAL SIGNS: BP (!) 162/88   Pulse 96   Temp 36.5 °C (97.7 °F) (Temporal)   Resp 18   Ht 1.676 m (5' 6\")   Wt 54.4 kg (120 lb)   SpO2 97%   BMI 19.37 kg/m²   Pulse ox interpretation: I interpret this pulse ox as normal.    Constitutional: Alert in no apparent distress.  HENT: No signs of trauma, Bilateral external ears normal, Nose normal.   Eyes: Pupils are equal and reactive, " Conjunctiva normal, Non-icteric.   Neck: Normal range of motion, No tenderness, Supple, No stridor.   Cardiovascular: Regular rate and rhythm, no murmurs.   Thorax & Lungs: Normal breath sounds, No respiratory distress, No wheezing, No chest tenderness.   Abdomen: Bowel sounds normal, Soft, No tenderness, No masses, No pulsatile masses. No peritoneal signs.  Skin: Warm, Dry, No erythema, No rash.   Back: Low L-spine tenderness with paraspinous tenderness no CVA tenderness.   Extremities: Intact distal pulses, No edema, No tenderness, No cyanosis,   Musculoskeletal: Good range of motion in all major joints. No tenderness to palpation or major deformities noted.   Neurologic: Alert , strength is 5 out of 5 with bilateral lower extremities, hip flexion and extension, knee flexion extension, dorsiflexion and plantar flexion at the ankles, sensation is intact to light touch throughout, there is no clonus, patellar reflex intact normal motor function, Normal sensory function, No focal deficits noted.   Psychiatric: Affect normal, Judgment normal, Mood normal.     DIAGNOSTIC STUDIES / PROCEDURES      LABS  Labs Reviewed   CBC WITH DIFFERENTIAL - Abnormal; Notable for the following components:       Result Value    RBC 3.96 (*)     Hemoglobin 11.7 (*)     Hematocrit 35.8 (*)     MCHC 32.7 (*)     MPV 7.9 (*)     Neutrophils-Polys 83.30 (*)     Lymphocytes 8.80 (*)     Neutrophils (Absolute) 8.69 (*)     Lymphs (Absolute) 0.92 (*)     All other components within normal limits   COMP METABOLIC PANEL - Abnormal; Notable for the following components:    Sodium 134 (*)     Globulin 3.9 (*)     All other components within normal limits   ESTIMATED GFR   LACTIC ACID   LACTIC ACID   LACTIC ACID   BLOOD CULTURE   BLOOD CULTURE   CRP QUANTITIVE (NON-CARDIAC)   PROCALCITONIN       All labs reviewed by me.    RADIOLOGY  DX-LUMBAR SPINE-2 OR 3 VIEWS   Final Result      1.  Acute discitis/osteomyelitis at the L4-5 level. Recommend MRI  scan of the lumbar spine with gadolinium enhancement for further evaluation.      2.  These findings were discussed with AKASH JOSE at 9:20 PM 10/19/2022      MR-LUMBAR SPINE-WITH & W/O    (Results Pending)     The radiologist's interpretation of all radiological studies have been reviewed and images independently viewed by me.    COURSE & MEDICAL DECISION MAKING  Nursing notes, VS, PMSFHx reviewed in chart.    8:35 PM Patient seen and examined at bedside. Patient will be treated with ketorolac. Ordered for CBC, CMP, L-spine x-ray to evaluate his symptoms.     9:40 PM  Patient is reevaluated, updated on results, his pain is returning to order for additional IV pain medication.  Additionally with his x-ray findings, will plan for stat MRI and admission by hospital for further pain control as well as further evaluation of his symptoms.        Decision Making:  This is a 52 y.o. male presented with worsening back pain over the last 2 months.  His x-ray is quite concerning and shows findings of discitis/osteomyelitis.  Interestingly, patient has no high risk factors for this or history of immunocompromise.  No evidence of neurologic compromise on exam.  Patient is admitted to the hospital for both pain control as well as further evaluation of this, stat MRI is ordered hospitalist to follow.  Patient does not appear septic at this time.    As an update, after  MRI return, I did contact Dr. Tinajero from neurosurgery.  He will consult on the patient in the morning.  IV antibiotics will be instituted    Patient is admitted in guarded condition    FINAL IMPRESSION  1. Lumbar back pain    2. Weight loss     3.  Concern for osteomyelitis of lumbar spine     The note accurately reflects work and decisions made by me.  Akash Jose M.D.  10/20/2022  12:42 AM

## 2022-10-20 NOTE — PROGRESS NOTES
Pt admitted to room s632-1 via transport on [facility]. Pt   pain reported at 8/10 if attempting to ambulate on a scale of 0-10. Oriented to room call light and smoking policy.  Welcome packet given and reviewed with patient, all questions answered. Education provided on oral hygiene program.   Call light with in reach. Hourly rounding in place.

## 2022-10-20 NOTE — ED NOTES
Med Rec complete per Pt at bedside.  Allergies reviewed.  Home Pharmacy:  [facility]

## 2022-10-20 NOTE — ASSESSMENT & PLAN NOTE
Read on MRI     S/p L4-L5 disc biopsy and left paravertebral phlegmon 10/21  Continue vancomycin per ID     negative...

## 2022-10-20 NOTE — ASSESSMENT & PLAN NOTE
lumbar x-ray showed acute discitis osteomyelitis at the L4-5 level.   MRI  shows L4-5 discitis/osteomyelitis and Prevertebral phlegmon    Neurosurgery consulted.  S/p L4-L5 disc biopsy and left paravertebral phlegmon 10/21    Wound culture positive for MRSA    ID following, continue IV vancomycin; plan for 6 weeks of IV antibiotics with end date 12/2    Plan for PICC line placement today    patient will stay with his parents,   Home infusion arrangement per

## 2022-10-20 NOTE — PROGRESS NOTES
Patient a+o x4. Denies any numbness/ tingling. C/o lower back pain - dilaudid given x1. Calls appropriately. Call bell within reach. VSS.

## 2022-10-20 NOTE — PROGRESS NOTES
"Pharmacy Vancomycin Kinetics Note for 10/20/2022     52 y.o. male on Vancomycin day # 1     Vancomycin Indication (AUC Dosing): Epidural Abscess (Epidural abscess/vertebral osteomyelitis)    Provider specified end date: 10/27/22    Active Antibiotics (From admission, onward)      Ordered     Ordering Provider       Thu Oct 20, 2022  3:51 AM    10/20/22 0351  vancomycin (VANCOCIN) 750 mg in  mL IVPB  (vancomycin (VANCOCIN) IV (LD + Maintenance))  EVERY 12 HOURS         Jose Carlos Rasmussen M.D.       Thu Oct 20, 2022  3:34 AM    10/20/22 0334  vancomycin (VANCOCIN) 1,250 mg in  mL IVPB  (vancomycin (VANCOCIN) IV (LD + Maintenance))  ONCE         Jose Carlos Rasmussen M.D.       Thu Oct 20, 2022  3:07 AM    10/20/22 0307  ampicillin/sulbactam (UNASYN) 3 g in  mL IVPB  EVERY 6 HOURS         Jose Carlos Rasmussen M.D.    10/20/22 0307  MD Alert...Vancomycin per Pharmacy  PHARMACY TO DOSE        Question:  Indication(s) for vancomycin?  Answer:  Epidural abscess/vertebral osteomyelitis    Jose Carlos Rasmussen M.D.            Dosing Weight: 54.4 kg (119 lb 14.9 oz)      Admission History: Admitted on 10/19/2022 for Osteomyelitis of vertebra (HCC) [M46.20]  Pertinent history: pt is a 51 yo male started on unasyn and vancomycin for epidural abscess/vertebral osteomyelitis    Allergies:     Patient has no known allergies.     Pertinent cultures to date:     Results       Procedure Component Value Units Date/Time    Blood Culture [[id number]] Collected: 10/19/22 2239    Order Status: Sent Specimen: Blood from Peripheral Updated: 10/20/22 0006    Narrative:      2 of 2 blood culture x2  Sites order. Per Hospital Policy:  Only change Specimen Src: to \"Line\" if specified by physician  order.    Blood Culture [[id number]] Collected: 10/19/22 2157    Order Status: Sent Specimen: Blood from Peripheral Updated: 10/19/22 2213    Narrative:      1 of 2 for Blood Culture x 2 sites order. Per Hospital  Policy: Only change Specimen " "Src: to \"Line\" if specified by  physician order.            Labs:     Estimated Creatinine Clearance: 115.3 mL/min (by C-G formula based on SCr of 0.59 mg/dL).  Recent Labs     10/19/22  2051 10/20/22  0155   WBC 10.4 9.4   NEUTSPOLYS 83.30* 84.00*     Recent Labs     10/19/22  2051   BUN 13   CREATININE 0.59   ALBUMIN 3.7     No intake or output data in the 24 hours ending 10/20/22 0353   /82   Pulse 88   Temp 36.7 °C (98 °F) (Temporal)   Resp 18   Ht 1.676 m (5' 6\")   Wt 54.4 kg (120 lb)   SpO2 96%  Temp (24hrs), Av.6 °C (97.9 °F), Min:36.5 °C (97.7 °F), Max:36.7 °C (98 °F)      List concerns for Vancomycin clearance:     BUN/Scr ratio greater than 20:1    Pharmacokinetics:     AUC kinetics:   Ke (hr ^-1): 0.0845 hr^-1  Half life: 8.2 hr  Clearance: 2.988  Estimated TDD: 1494  Estimated Dose: 635  Estimated interval: 10.2    A/P:     -  Vancomycin dose: 750 mg q12h (0500, 1700)     -  Next vancomycin level(s): None ordered. @steady state    -  Predicted vancomycin AUC from initial AUC test calculator: 502 mg·hr/L    -  Comments: Relatively limited concern for accumulation. Pharmacy will monitor and obtain level at steady state.    Carly Lao, PharmD    "

## 2022-10-21 ENCOUNTER — APPOINTMENT (OUTPATIENT)
Dept: RADIOLOGY | Facility: MEDICAL CENTER | Age: 52
DRG: 477 | End: 2022-10-21
Attending: INTERNAL MEDICINE
Payer: COMMERCIAL

## 2022-10-21 LAB
ALBUMIN SERPL BCP-MCNC: 3.3 G/DL (ref 3.2–4.9)
BACTERIA BLD CULT: ABNORMAL
BACTERIA BLD CULT: ABNORMAL
BUN SERPL-MCNC: 11 MG/DL (ref 8–22)
CALCIUM SERPL-MCNC: 8.9 MG/DL (ref 8.5–10.5)
CHLORIDE SERPL-SCNC: 97 MMOL/L (ref 96–112)
CO2 SERPL-SCNC: 23 MMOL/L (ref 20–33)
CREAT SERPL-MCNC: 0.6 MG/DL (ref 0.5–1.4)
ERYTHROCYTE [DISTWIDTH] IN BLOOD BY AUTOMATED COUNT: 45.1 FL (ref 35.9–50)
GFR SERPLBLD CREATININE-BSD FMLA CKD-EPI: 117 ML/MIN/1.73 M 2
GLUCOSE SERPL-MCNC: 89 MG/DL (ref 65–99)
GRAM STN SPEC: NORMAL
GRAM STN SPEC: NORMAL
HCT VFR BLD AUTO: 35.1 % (ref 42–52)
HGB BLD-MCNC: 11.6 G/DL (ref 14–18)
INR PPP: 1.11 (ref 0.87–1.13)
MCH RBC QN AUTO: 29.7 PG (ref 27–33)
MCHC RBC AUTO-ENTMCNC: 33 G/DL (ref 33.7–35.3)
MCV RBC AUTO: 89.8 FL (ref 81.4–97.8)
PHOSPHATE SERPL-MCNC: 3.2 MG/DL (ref 2.5–4.5)
PLATELET # BLD AUTO: 449 K/UL (ref 164–446)
PMV BLD AUTO: 8.2 FL (ref 9–12.9)
POTASSIUM SERPL-SCNC: 4.1 MMOL/L (ref 3.6–5.5)
PROTHROMBIN TIME: 14.2 SEC (ref 12–14.6)
RBC # BLD AUTO: 3.91 M/UL (ref 4.7–6.1)
SCCMEC + MECA PNL NOSE NAA+PROBE: NEGATIVE
SIGNIFICANT IND 70042: ABNORMAL
SIGNIFICANT IND 70042: NORMAL
SIGNIFICANT IND 70042: NORMAL
SITE SITE: ABNORMAL
SITE SITE: NORMAL
SITE SITE: NORMAL
SODIUM SERPL-SCNC: 133 MMOL/L (ref 135–145)
SOURCE SOURCE: ABNORMAL
SOURCE SOURCE: NORMAL
SOURCE SOURCE: NORMAL
WBC # BLD AUTO: 9.1 K/UL (ref 4.8–10.8)

## 2022-10-21 PROCEDURE — 85610 PROTHROMBIN TIME: CPT

## 2022-10-21 PROCEDURE — 306637 HCHG MISC ORTHO ITEM RC 0274

## 2022-10-21 PROCEDURE — 87147 CULTURE TYPE IMMUNOLOGIC: CPT

## 2022-10-21 PROCEDURE — 700111 HCHG RX REV CODE 636 W/ 250 OVERRIDE (IP)

## 2022-10-21 PROCEDURE — 20999 UNLISTED PX MUSCSKEL GENERAL: CPT

## 2022-10-21 PROCEDURE — 87116 MYCOBACTERIA CULTURE: CPT | Mod: 91

## 2022-10-21 PROCEDURE — 80069 RENAL FUNCTION PANEL: CPT

## 2022-10-21 PROCEDURE — 87205 SMEAR GRAM STAIN: CPT | Mod: 91

## 2022-10-21 PROCEDURE — 700102 HCHG RX REV CODE 250 W/ 637 OVERRIDE(OP): Performed by: STUDENT IN AN ORGANIZED HEALTH CARE EDUCATION/TRAINING PROGRAM

## 2022-10-21 PROCEDURE — 87070 CULTURE OTHR SPECIMN AEROBIC: CPT

## 2022-10-21 PROCEDURE — A9270 NON-COVERED ITEM OR SERVICE: HCPCS | Performed by: STUDENT IN AN ORGANIZED HEALTH CARE EDUCATION/TRAINING PROGRAM

## 2022-10-21 PROCEDURE — 20225 BONE BIOPSY TROCAR/NDL DEEP: CPT

## 2022-10-21 PROCEDURE — 0QB03ZX EXCISION OF LUMBAR VERTEBRA, PERCUTANEOUS APPROACH, DIAGNOSTIC: ICD-10-PCS | Performed by: RADIOLOGY

## 2022-10-21 PROCEDURE — 700111 HCHG RX REV CODE 636 W/ 250 OVERRIDE (IP): Performed by: RADIOLOGY

## 2022-10-21 PROCEDURE — 85027 COMPLETE CBC AUTOMATED: CPT

## 2022-10-21 PROCEDURE — 87077 CULTURE AEROBIC IDENTIFY: CPT

## 2022-10-21 PROCEDURE — 770001 HCHG ROOM/CARE - MED/SURG/GYN PRIV*

## 2022-10-21 PROCEDURE — 87040 BLOOD CULTURE FOR BACTERIA: CPT

## 2022-10-21 PROCEDURE — 0WBH3ZX EXCISION OF RETROPERITONEUM, PERCUTANEOUS APPROACH, DIAGNOSTIC: ICD-10-PCS | Performed by: RADIOLOGY

## 2022-10-21 PROCEDURE — 99233 SBSQ HOSP IP/OBS HIGH 50: CPT | Performed by: INTERNAL MEDICINE

## 2022-10-21 PROCEDURE — 87641 MR-STAPH DNA AMP PROBE: CPT

## 2022-10-21 PROCEDURE — 87206 SMEAR FLUORESCENT/ACID STAI: CPT | Mod: 91

## 2022-10-21 PROCEDURE — 87015 SPECIMEN INFECT AGNT CONCNTJ: CPT | Mod: 91

## 2022-10-21 PROCEDURE — 700105 HCHG RX REV CODE 258: Performed by: STUDENT IN AN ORGANIZED HEALTH CARE EDUCATION/TRAINING PROGRAM

## 2022-10-21 PROCEDURE — 700111 HCHG RX REV CODE 636 W/ 250 OVERRIDE (IP): Performed by: INTERNAL MEDICINE

## 2022-10-21 PROCEDURE — 87186 SC STD MICRODIL/AGAR DIL: CPT

## 2022-10-21 PROCEDURE — 87102 FUNGUS ISOLATION CULTURE: CPT

## 2022-10-21 PROCEDURE — 700111 HCHG RX REV CODE 636 W/ 250 OVERRIDE (IP): Performed by: STUDENT IN AN ORGANIZED HEALTH CARE EDUCATION/TRAINING PROGRAM

## 2022-10-21 PROCEDURE — 99223 1ST HOSP IP/OBS HIGH 75: CPT | Performed by: INTERNAL MEDICINE

## 2022-10-21 PROCEDURE — 36415 COLL VENOUS BLD VENIPUNCTURE: CPT

## 2022-10-21 RX ORDER — ONDANSETRON 2 MG/ML
4 INJECTION INTRAMUSCULAR; INTRAVENOUS PRN
Status: ACTIVE | OUTPATIENT
Start: 2022-10-21 | End: 2022-10-21

## 2022-10-21 RX ORDER — MIDAZOLAM HYDROCHLORIDE 1 MG/ML
INJECTION INTRAMUSCULAR; INTRAVENOUS
Status: COMPLETED
Start: 2022-10-21 | End: 2022-10-21

## 2022-10-21 RX ORDER — SODIUM CHLORIDE 9 MG/ML
500 INJECTION, SOLUTION INTRAVENOUS
Status: ACTIVE | OUTPATIENT
Start: 2022-10-21 | End: 2022-10-21

## 2022-10-21 RX ORDER — LIDOCAINE HYDROCHLORIDE 10 MG/ML
INJECTION, SOLUTION EPIDURAL; INFILTRATION; INTRACAUDAL; PERINEURAL
Status: DISPENSED
Start: 2022-10-21 | End: 2022-10-22

## 2022-10-21 RX ORDER — MIDAZOLAM HYDROCHLORIDE 1 MG/ML
.5-2 INJECTION INTRAMUSCULAR; INTRAVENOUS PRN
Status: ACTIVE | OUTPATIENT
Start: 2022-10-21 | End: 2022-10-21

## 2022-10-21 RX ADMIN — VANCOMYCIN HYDROCHLORIDE 750 MG: 500 INJECTION, POWDER, LYOPHILIZED, FOR SOLUTION INTRAVENOUS at 05:27

## 2022-10-21 RX ADMIN — HYDROMORPHONE HYDROCHLORIDE 1 MG: 1 INJECTION, SOLUTION INTRAMUSCULAR; INTRAVENOUS; SUBCUTANEOUS at 11:37

## 2022-10-21 RX ADMIN — POLYETHYLENE GLYCOL 3350 1 PACKET: 17 POWDER, FOR SOLUTION ORAL at 18:01

## 2022-10-21 RX ADMIN — HYDROMORPHONE HYDROCHLORIDE 1 MG: 1 INJECTION, SOLUTION INTRAMUSCULAR; INTRAVENOUS; SUBCUTANEOUS at 18:00

## 2022-10-21 RX ADMIN — VANCOMYCIN HYDROCHLORIDE 750 MG: 500 INJECTION, POWDER, LYOPHILIZED, FOR SOLUTION INTRAVENOUS at 18:01

## 2022-10-21 RX ADMIN — FENTANYL CITRATE 50 MCG: 50 INJECTION, SOLUTION INTRAMUSCULAR; INTRAVENOUS at 17:13

## 2022-10-21 RX ADMIN — HYDROCODONE BITARTRATE AND ACETAMINOPHEN 2 TABLET: 5; 325 TABLET ORAL at 05:25

## 2022-10-21 RX ADMIN — MIDAZOLAM HYDROCHLORIDE 1 MG: 1 INJECTION, SOLUTION INTRAMUSCULAR; INTRAVENOUS at 17:22

## 2022-10-21 RX ADMIN — HYDROCODONE BITARTRATE AND ACETAMINOPHEN 2 TABLET: 5; 325 TABLET ORAL at 15:17

## 2022-10-21 RX ADMIN — SENNOSIDES AND DOCUSATE SODIUM 2 TABLET: 50; 8.6 TABLET ORAL at 05:27

## 2022-10-21 RX ADMIN — MIDAZOLAM HYDROCHLORIDE 1 MG: 1 INJECTION, SOLUTION INTRAMUSCULAR; INTRAVENOUS at 17:13

## 2022-10-21 RX ADMIN — HYDROMORPHONE HYDROCHLORIDE 1 MG: 1 INJECTION, SOLUTION INTRAMUSCULAR; INTRAVENOUS; SUBCUTANEOUS at 02:45

## 2022-10-21 RX ADMIN — MIDAZOLAM 1 MG: 1 INJECTION, SOLUTION INTRAMUSCULAR; INTRAVENOUS at 17:13

## 2022-10-21 RX ADMIN — CEFTRIAXONE SODIUM 2 G: 10 INJECTION, POWDER, FOR SOLUTION INTRAVENOUS at 18:00

## 2022-10-21 RX ADMIN — HYDROCODONE BITARTRATE AND ACETAMINOPHEN 2 TABLET: 5; 325 TABLET ORAL at 22:00

## 2022-10-21 RX ADMIN — FENTANYL CITRATE 50 MCG: 50 INJECTION, SOLUTION INTRAMUSCULAR; INTRAVENOUS at 17:25

## 2022-10-21 ASSESSMENT — ENCOUNTER SYMPTOMS
TREMORS: 0
CHILLS: 0
WHEEZING: 0
FALLS: 0
SHORTNESS OF BREATH: 0
EYE REDNESS: 0
COUGH: 0
LOSS OF CONSCIOUSNESS: 0
DIARRHEA: 0
NAUSEA: 0
EYE PAIN: 0
NERVOUS/ANXIOUS: 0
HEMOPTYSIS: 0
BLOOD IN STOOL: 0
BACK PAIN: 1
FOCAL WEAKNESS: 0
MYALGIAS: 0
ABDOMINAL PAIN: 0
HEADACHES: 0
CONSTIPATION: 0
INSOMNIA: 0
WEAKNESS: 0
VOMITING: 0
FEVER: 0
DIZZINESS: 0
SEIZURES: 0
PALPITATIONS: 0

## 2022-10-21 ASSESSMENT — PAIN DESCRIPTION - PAIN TYPE
TYPE: ACUTE PAIN
TYPE: CHRONIC PAIN
TYPE: ACUTE PAIN

## 2022-10-21 NOTE — PROGRESS NOTES
"Hospital Medicine Daily Progress Note    Date of Service  10/21/2022    Chief Complaint  Yonis Ward is a 52 y.o. male admitted 10/19/2022 with Back Pain (Severe lower back pain that has steadily gotten worse for the last two months. Denies injury, numbness/tingling. Occasional weakness down left leg. PIV placed with EMS, pt received 100mcg fentanyl and 1mg versed )      Hospital Course  No notes on file  Yonis Ward is a 52 y.o. male without any chronic medical conditions not on any long-term medications who presented 10/19/2022 with severe low back pain.  Patient first began experiencing back pain in mid August 2022.  No trauma or inciting events, he reports he just woke up one evening and his back \"locked up\" and he was in a lot of pain.  Does work as a [employer]  but again does not recall any specific inciting event.  His back pain is waxed and waned over the past 2 months from mild to severe.  He has no weakness or numbness or tingling, no saddle anesthesia, no bowel or bladder incontinence.  Denies any fevers or chills, headache or vision changes, chest pain dyspnea cough nausea or vomiting.  He has had poor p.o. intake due to ongoing back pain and he is lost approximately 20 pounds over the last few months.  He denies any spine or vertebral injections, denies IV drug use.  No history of infections or immunocompromised state.  He has tried chiropractic care, hot baths, ibuprofen, naproxen, cold packs, Flexeril, narcotics all without significant relief.  He presents because he has had acutely worsening pain over the past 2 weeks now severe impacting his ability to ambulate.     In ED he is afebrile pulse is ranged from  normal respiratory rate and room air oxygen saturation blood pressures range from 130s to 160s.  Initial work-up WBC 10.4, hemoglobin 11.7 normal platelets, sodium 134 otherwise normal electrolytes renal function and liver function, lactic acid 0.9 lumbar x-ray showed " "acute discitis osteomyelitis at the L4-5 level recommend MRI scan of lumbar spine with contrast.  MRI ordered stat preliminary read shows L4-5 discitis/osteomyelitis with ventral epidural abscess.  ERP discussed case with Dr. Tinajero from neurosurgery who agrees to evaluate the patient.  Patient was started on Unasyn and vancomycin. \"    Dr. Rasmussen  Interval Problem Update  10/20. Neurosurgery recommend CT biopsy of L4-5. I called IR and ordered the consult. They would like blood cultures officially negative before the biopsy. Meanwhile patient denies saddle anesthesia, foot drops, paralysis or bowel/bladder spillage.  10/21. Discussed with ID  Planned  Patient has back discomfort otherwisehe does NOT have saddle anesthesia, urine or fecal spillage, pareses/paralysis or foot drops, or compressive symptoms.    I have discussed this patient's plan of care and discharge plan at IDT rounds today with Case Management, Nursing, Nursing leadership, and other members of the IDT team.    Consultants/Specialty  Neurosurgery    Code Status  Full Code    Disposition  Patient is not medically cleared for discharge.   Anticipate discharge to  TBD .  I have placed the appropriate orders for post-discharge needs.    Review of Systems  Review of Systems   Constitutional:  Negative for chills and fever.   HENT:  Negative for congestion, hearing loss and nosebleeds.    Eyes:  Negative for pain and redness.   Respiratory:  Negative for cough, hemoptysis, shortness of breath and wheezing.    Cardiovascular:  Negative for chest pain and palpitations.   Gastrointestinal:  Negative for abdominal pain, blood in stool, constipation, diarrhea, nausea and vomiting.   Genitourinary:  Negative for dysuria, frequency and hematuria.   Musculoskeletal:  Positive for back pain. Negative for falls, joint pain and myalgias.   Skin:  Negative for rash.   Neurological:  Negative for dizziness, tremors, focal weakness, seizures, loss of consciousness, " weakness and headaches.   Psychiatric/Behavioral:  The patient is not nervous/anxious and does not have insomnia.    All other systems reviewed and are negative.     Physical Exam  Temp:  [36.4 °C (97.6 °F)-37.2 °C (98.9 °F)] 37.2 °C (98.9 °F)  Pulse:  [79-99] 90  Resp:  [15-18] 16  BP: (148-159)/() 156/98  SpO2:  [96 %-99 %] 97 %    Physical Exam  Vitals and nursing note reviewed.   Constitutional:       Comments: Thin   HENT:      Head: Normocephalic and atraumatic.      Right Ear: External ear normal.      Left Ear: External ear normal.      Nose: Nose normal.      Mouth/Throat:      Mouth: Mucous membranes are moist.   Eyes:      General: No scleral icterus.     Conjunctiva/sclera: Conjunctivae normal.   Cardiovascular:      Rate and Rhythm: Normal rate and regular rhythm.      Heart sounds: No murmur heard.    No friction rub. No gallop.   Pulmonary:      Effort: Pulmonary effort is normal.      Breath sounds: Normal breath sounds.   Abdominal:      General: Abdomen is flat. Bowel sounds are normal. There is no distension.      Palpations: Abdomen is soft.      Tenderness: There is no abdominal tenderness. There is no guarding.   Musculoskeletal:         General: Tenderness (low back, moderate intensity) present. Normal range of motion.      Cervical back: Normal range of motion and neck supple.   Skin:     General: Skin is warm.   Neurological:      Mental Status: He is alert and oriented to person, place, and time. Mental status is at baseline.   Psychiatric:         Mood and Affect: Mood normal.         Behavior: Behavior normal.         Thought Content: Thought content normal.         Judgment: Judgment normal.       Fluids    Intake/Output Summary (Last 24 hours) at 10/21/2022 1434  Last data filed at 10/21/2022 0900  Gross per 24 hour   Intake 360 ml   Output --   Net 360 ml         Laboratory  Recent Labs     10/19/22  2051 10/20/22  0155 10/21/22  0426   WBC 10.4 9.4 9.1   RBC 3.96* 4.07* 3.91*  "  HEMOGLOBIN 11.7* 11.9* 11.6*   HEMATOCRIT 35.8* 36.7* 35.1*   MCV 90.4 90.2 89.8   MCH 29.5 29.2 29.7   MCHC 32.7* 32.4* 33.0*   RDW 45.1 45.7 45.1   PLATELETCT 402 370 449*   MPV 7.9* 8.9* 8.2*       Recent Labs     10/19/22  2051 10/20/22  0402 10/21/22  0426   SODIUM 134* 132* 133*   POTASSIUM 4.0 3.9 4.1   CHLORIDE 99 98 97   CO2 23 19* 23   GLUCOSE 87 82 89   BUN 13 16 11   CREATININE 0.59 0.57 0.60   CALCIUM 8.9 8.7 8.9       Recent Labs     10/21/22  0426   INR 1.11               Imaging  MR-LUMBAR SPINE-WITH & W/O   Final Result      1.  L4-5 discitis.   2.  L4 and L5 osteomyelitis.   3.  Epidural and prevertebral phlegmon at the level of L4-5.   4.  Moderate central canal stenosis at L4-5.   5.  Severe bilateral L4 and L5 degenerative neural foraminal stenosis.   6.  There is no well-defined fluid collection.      DX-LUMBAR SPINE-2 OR 3 VIEWS   Final Result      1.  Acute discitis/osteomyelitis at the L4-5 level. Recommend MRI scan of the lumbar spine with gadolinium enhancement for further evaluation.      2.  These findings were discussed with AKASH JOSE at 9:20 PM 10/19/2022      CT-NEEDLE BX-DEEP BONE    (Results Pending)          Assessment/Plan  * Osteomyelitis of vertebra, epidural abscess (HCC)- (present on admission)  Assessment & Plan  X-ray showed acute discitis osteomyelitis to the L4-5 level  MRI with contrast showed L4-5 discitis/osteo with ventral epidural abscess on preliminary read.  On Unasyn and vancomycin.  Follow blood cultures.  CRP 8.96, check ESR.  Neurosurgery consulted.  Recommend ID consult in a.m.\"    1 of 2 GPR blood cultures likely contaminant  Repeat blood cultures ordered  CT guided biopsy today  COnsulted ID  Continue current antibiotics  Neurosurgery following.     Epidural abscess- (present on admission)  Assessment & Plan  MRI with contrast showed L4-5 discitis/osteomyelitis with ventral epidural abscess on preliminary read.  Follow-up official read.  ERP discussed " "with neurosurgery Dr. Tinajero who will evaluate patient.  Unasyn and vancomycin.  Follow blood cultures.  ID consult in a.m..  Unclear etiology no injections, no current or prior IV drug use, no history of significant infections or bacteremia.\"    As above    Elevated blood-pressure reading without diagnosis of hypertension- (present on admission)  Assessment & Plan  Likely elevated secondary to pain and anxiety with new hospital admission.  IV antihypertensives ordered with parameters, if remains persistently elevated would benefit from initiating p.o. regimen.    Intractable back pain- (present on admission)  Assessment & Plan  Found to have L4-5 discitis/osteomyelitis with ventral epidural abscess on preliminary read.  Pain control.  Follow neurosurgery recommendations.    Anemia- (present on admission)  Assessment & Plan  No reports of hemorrhage, continue to monitor.  Transfuse hemoglobin less than 7\"    Recent Labs     10/19/22  2051 10/20/22  0155   HEMOGLOBIN 11.7* 11.9*   HEMATOCRIT 35.8* 36.7*   MCV 90.4 90.2   MCH 29.5 29.2   PLATELETCT 402 370     No active bleeding       VTE prophylaxis: SCDs/TEDs    I have performed a physical exam and reviewed and updated ROS and Plan today (10/21/2022). In review of yesterday's note (10/20/2022), there are no changes except as documented above.        "

## 2022-10-21 NOTE — CARE PLAN
Problem: Pain - Standard  Goal: Alleviation of pain or a reduction in pain to the patient’s comfort goal  Outcome: Progressing     The patient is Stable - Low risk of patient condition declining or worsening    Shift Goals  Clinical Goals: pain management    Progress made toward(s) clinical / shift goals:  medicated for pain per MAR.    Patient is not progressing towards the following goals:

## 2022-10-21 NOTE — CARE PLAN
The patient is Stable - Low risk of patient condition declining or worsening    Shift Goals  Clinical Goals: Biopsy when BC negative  Patient Goals: pain management    Progress made toward(s) clinical / shift goals:  patient pain controlled with PRN pain medication and patient BC with one of two bottles positive in preliminary results. Will await final culture results. Patient alert and oriented x4, vss, medicated for pain per MAR. Continues IV antibiotics. Patient resting in bed comfortably and calls appropriately. Low fall risk and no bed alarm in place.       Problem: Pain - Standard  Goal: Alleviation of pain or a reduction in pain to the patient’s comfort goal  Outcome: Progressing     Problem: Skin Integrity  Goal: Skin integrity is maintained or improved  Outcome: Progressing

## 2022-10-21 NOTE — CONSULTS
"INFECTIOUS DISEASES INPATIENT CONSULT NOTE     Date of Service: 10/21/2022    Consult Requested By: Jacobo Xie M.D.    Reason for Consultation: Vertebral discitis and osteomyelitis    History of Present Illness:   Yonis Ward is a 52 y.o. man with no prior medical history admitted 10/19/2022 secondary to worsening lower back pain.  Patient was in usual state of health until August when he started to develop back pain and felt as though his back had been \"locked up.\"  Patient works as a [employer]  and denies any prior injury or trauma.  His back pain has been intermittent over the past several months.  Patient initially presented to the urgent care back in August and was given pain medications and a Toradol injection.  He had some relief for several days however his back pain returned.  He returned to the urgent care several weeks later and insisted on imaging however he was told by the providers that imaging was not necessary at this time.  Patient was given pain medication again in addition to a Medrol dose pack.  He denies any numbness or weakness, no bladder or bowel incontinence.  He has been experiencing intermittent fevers and chills.  Over the past 2 months, he has had poor oral intake secondary to persistent back pain and has lost approximately 20 pounds.  He denies any IV drug use.  No recent dental procedures.  He has tried over-the-counter medications for symptomatic pain control without any significant improvement.  Given his persistent symptoms, he presented to the ED for further evaluation and management.  On presentation, he was afebrile with a normal white count.  X-ray of the lumbar spine revealed acute discitis/osteomyelitis of the L4-L5 region.  MRI of the lumbar spine also reveals L4-L5 discitis, osteomyelitis and epidural and prevertebral phlegmon at the level of L4-L5 with moderate central canal stenosis at this level but no well-defined fluid collection.  1 blood culture set " on 10/19 is growing bacillus species which is a contaminant.  Patient was evaluated by neurosurgery and stated that no surgical intervention was needed at this time given and normal exam.  Awaiting IR biopsy.  Patient is currently on vancomycin and ceftriaxone.  Infectious disease service consulted for antibiotic recommendations.      All other review of systems reviewed and negative except those documented above in the HPI.     PMH:   None    PSH:  None    FAMILY HX:  Family History   Problem Relation Age of Onset    Hypertension Mother     Heart Disease Father        SOCIAL HX:  Social History     Socioeconomic History    Marital status:      Spouse name: Not on file    Number of children: Not on file    Years of education: Not on file    Highest education level: Some college, no degree   Occupational History    Not on file   Tobacco Use    Smoking status: Former     Types: Cigarettes     Quit date: 2018     Years since quittin.0    Smokeless tobacco: Never   Vaping Use    Vaping Use: Never used   Substance and Sexual Activity    Alcohol use: Not Currently     Comment: occ    Drug use: Never    Sexual activity: Not Currently   Other Topics Concern    Not on file   Social History Narrative    Not on file     Social Determinants of Health     Financial Resource Strain: Low Risk     Difficulty of Paying Living Expenses: Not very hard   Food Insecurity: No Food Insecurity    Worried About Running Out of Food in the Last Year: Never true    Ran Out of Food in the Last Year: Never true   Transportation Needs: No Transportation Needs    Lack of Transportation (Medical): No    Lack of Transportation (Non-Medical): No   Physical Activity: Sufficiently Active    Days of Exercise per Week: 5 days    Minutes of Exercise per Session: 60 min   Stress: Stress Concern Present    Feeling of Stress : To some extent   Social Connections: Moderately Isolated    Frequency of Communication with Friends and Family: Three  times a week    Frequency of Social Gatherings with Friends and Family: Twice a week    Attends Jewish Services: More than 4 times per year    Active Member of Clubs or Organizations: No    Attends Club or Organization Meetings: Never    Marital Status:    Intimate Partner Violence: Not on file   Housing Stability: High Risk    Unable to Pay for Housing in the Last Year: No    Number of Places Lived in the Last Year: 1    Unstable Housing in the Last Year: Yes     Social History     Tobacco Use   Smoking Status Former    Types: Cigarettes    Quit date: 2018    Years since quittin.0   Smokeless Tobacco Never     Social History     Substance and Sexual Activity   Alcohol Use Not Currently    Comment: occ       Allergies/Intolerances:  No Known Allergies    History reviewed with the patient     Other Current Medications:    Current Facility-Administered Medications:     MD Alert...Vancomycin per Pharmacy, , Other, PHARMACY TO DOSE, Jose Carlos Rasmussen M.D.    simethicone (Mylicon) chewable tablet 125 mg, 125 mg, Oral, TID PRN, Jose Carlos Rasmussen M.D.    calcium carbonate (Tums) chewable tab 500 mg, 500 mg, Oral, TID PRN, Jose Carlos Rasmussen M.D.    vancomycin (VANCOCIN) 750 mg in  mL IVPB, 750 mg, Intravenous, Q12HR, Jose Carlos Rasmussen M.D., Stopped at 10/21/22 0727    cefTRIAXone (Rocephin) syringe 1 g, 1 g, Intravenous, Q24HRS, Jacobo Xie M.D., 1 g at 10/20/22 2017    senna-docusate (PERICOLACE or SENOKOT S) 8.6-50 MG per tablet 2 Tablet, 2 Tablet, Oral, BID, 2 Tablet at 10/21/22 0527 **AND** polyethylene glycol/lytes (MIRALAX) PACKET 1 Packet, 1 Packet, Oral, QDAY PRN **AND** magnesium hydroxide (MILK OF MAGNESIA) suspension 30 mL, 30 mL, Oral, QDAY PRN **AND** bisacodyl (DULCOLAX) suppository 10 mg, 10 mg, Rectal, QDAY PRN, Jose Carlos Rasmussen M.D.    acetaminophen (Tylenol) tablet 650 mg, 650 mg, Oral, Q6HRS PRN, Jose Carlos Rasmussen M.D.    labetalol (NORMODYNE/TRANDATE) injection 10 mg, 10  "mg, Intravenous, Q4HRS PRN, Jose Carlos Rasmussen M.D.    ondansetron (ZOFRAN) syringe/vial injection 4 mg, 4 mg, Intravenous, Q4HRS PRN, Jose Carlos Rasmussen M.D.    ondansetron (ZOFRAN ODT) dispertab 4 mg, 4 mg, Oral, Q4HRS PRN, Jose Carlos Rasmussen M.D.    promethazine (PHENERGAN) tablet 12.5-25 mg, 12.5-25 mg, Oral, Q4HRS PRN, Jose Carlos Rasmussen M.D.    promethazine (PHENERGAN) suppository 12.5-25 mg, 12.5-25 mg, Rectal, Q4HRS PRN, Jose Carlos Rasmussen M.D.    prochlorperazine (COMPAZINE) injection 5-10 mg, 5-10 mg, Intravenous, Q4HRS PRN, Jose Carlos Rasmussen M.D.    HYDROcodone-acetaminophen (NORCO) 5-325 MG per tablet 1-2 Tablet, 1-2 Tablet, Oral, Q6HRS PRN, Jose Carlos Rasmussen M.D., 2 Tablet at 10/21/22 0525    HYDROmorphone (Dilaudid) injection 1 mg, 1 mg, Intravenous, Q4HRS PRN, Jose Carlos Rasmussen M.D., 1 mg at 10/21/22 0245  [unfilled]    Most Recent Vital Signs:  BP (!) 156/98   Pulse 90   Temp 37.2 °C (98.9 °F) (Temporal)   Resp 15   Ht 1.676 m (5' 6\")   Wt 54.6 kg (120 lb 5.9 oz)   SpO2 97%   BMI 19.43 kg/m²   Temp  Av.6 °C (97.9 °F)  Min: 36.2 °C (97.2 °F)  Max: 37.2 °C (98.9 °F)    Physical Exam:  General: well nourished, no diaphoresis, well-appearing, no acute distress  HEENT: sclera anicteric, PERRL, extraocular muscles intact, mucous membranes moist, oropharynx clear and moist, no oral lesions or exudate  Neck: supple, no lymphadenopathy  Chest: CTAB, no rales, rhonchi or wheezes, normal work of breathing.  Cardiac: regular rate and rhythm, normal S1 S2, no murmurs, rubs or gallops  Abdomen: + bowel sounds, soft, non-tender, non-distended, no hepatosplenomegaly  Back: Lumbar paraspinal tenderness to palpation.  No fluctuance or erythema  Extremities: WWP, no edema, 2+ pedal pulses  Skin: warm and dry, no rashes or worrisome lesions, some tattoos  Neuro: Alert and oriented times 3, non-focal exam, speech fluent, full range of motion to bilateral upper and lower extremities  Psych: normal mood and " "behavior, pleasant; memory intact, normal judgement    Pertinent Lab Results:  Recent Labs     10/19/22  2051 10/20/22  0155 10/21/22  0426   WBC 10.4 9.4 9.1      Recent Labs     10/19/22  2051 10/20/22  0155 10/21/22  0426   HEMOGLOBIN 11.7* 11.9* 11.6*   HEMATOCRIT 35.8* 36.7* 35.1*   MCV 90.4 90.2 89.8   MCH 29.5 29.2 29.7   PLATELETCT 402 370 449*         Recent Labs     10/19/22  2051 10/20/22  0402 10/21/22  0426   SODIUM 134* 132* 133*   POTASSIUM 4.0 3.9 4.1   CHLORIDE 99 98 97   CO2 23 19* 23   CREATININE 0.59 0.57 0.60        Recent Labs     10/19/22  2051 10/21/22  0426   ALBUMIN 3.7 3.3        Pertinent Micro:  Results       Procedure Component Value Units Date/Time    Blood Culture [[id number]]  (Abnormal) Collected: 10/19/22 2157    Order Status: Completed Specimen: Blood from Peripheral Updated: 10/21/22 1021     Significant Indicator POS     Source BLD     Site Peripheral     Culture Result Growth detected by Bactec instrument. 10/20/2022  23:03      Bacillus species  Possible Contaminant  Isolated from one set only, please correlate with clinical  condition. Contact the Microbiology department within 48 hr  if identification and susceptibility are needed.      Narrative:      CALL  [facility]  61 tel. [phone],  CALLED  61 tel. [phone] 10/20/2022, 23:06, RB PERF. RESULTS CALLED  TO:[id number]  1 of 2 for Blood Culture x 2 sites order. Per Hospital  Policy: Only change Specimen Src: to \"Line\" if specified by  physician order.  No site indicated    Blood Culture [[id number]] Collected: 10/19/22 2239    Order Status: Completed Specimen: Blood from Peripheral Updated: 10/21/22 0812     Significant Indicator NEG     Source BLD     Site PERIPHERAL     Culture Result No Growth  Note: Blood cultures are incubated for 5 days and  are monitored continuously.Positive blood cultures  are called to the RN and reported as soon as  they are identified.      Narrative:      2 of 2 blood culture x2  Sites order. Per " "Hospital Policy:  Only change Specimen Src: to \"Line\" if specified by physician  order.  Right Hand          No results found for: BLOODCULTU, BLDCULT, BCHOLD     Studies:  DX-LUMBAR SPINE-2 OR 3 VIEWS    Result Date: 10/19/2022  10/19/2022 9:10 PM HISTORY/REASON FOR EXAM:  Severe low back pain x2 months. TECHNIQUE/ EXAM DESCRIPTION AND NUMBER OF VIEWS:  2 views of the lumbar spine. COMPARISON: None. FINDINGS: There are acute erosive changes involving the inferior endplate of the L4 vertebral body and superior endplate of the L5 vertebral body. There is also irregularity of the disc space at the L4-5 level. The bony trabecular pattern is normal.  The lumbar vertebral bodies have a normal height and alignment.   There is no evidence of spondylolisthesis or osseous lesion.  The posterior elements appear grossly normal on the limited series.     1.  Acute discitis/osteomyelitis at the L4-5 level. Recommend MRI scan of the lumbar spine with gadolinium enhancement for further evaluation. 2.  These findings were discussed with AKASH JOSE at 9:20 PM 10/19/2022    MR-LUMBAR SPINE-WITH & W/O    Result Date: 10/20/2022  10/19/2022 10:47 PM HISTORY/REASON FOR EXAM:  Severe back pain. TECHNIQUE/EXAM DESCRIPTION: MRI of the lumbar spine without and with contrast. The study was performed on a Search123 Signa 1.5 Taina MRI scanner. T1 sagittal, T2 fast spin-echo sagittal, and T2 axial images were obtained of the lumbar spine. T1 post-contrast fat-suppressed sagittal images were obtained. 10 mL ProHance contrast was administered intravenously. COMPARISON:  None. FINDINGS: There is destruction of L4-5 disc space with fluid collection. There are edema and contrast enhancement noted involving L4 and L5 vertebral bodies. There is epidural and prevertebral phlegmon noted surrounding the L4-5 disc space. There is no well-defined abscess. There is moderate central canal stenosis at this level. There is severe bilateral L5 neural foraminal " stenosis. At the level of L5-S1,there is disc degeneration. Bilateral facet joint arthropathy is seen. There is severe bilateral neural foraminal stenosis. At the level of L3-4,there is no spinal or neural foraminal stenosis. At the level of L2-3,there is no spinal or neural foraminal stenosis. At the level of L1-2,there is no spinal or neural foraminal stenosis. The conus terminates at the level of L1 the visualized lower thoracic spinal cord is unremarkable. There is no lumbar intradural lesion. There is an approximately 2.7 cm a cyst in the right kidney.     1.  L4-5 discitis. 2.  L4 and L5 osteomyelitis. 3.  Epidural and prevertebral phlegmon at the level of L4-5. 4.  Moderate central canal stenosis at L4-5. 5.  Severe bilateral L4 and L5 degenerative neural foraminal stenosis. 6.  There is no well-defined fluid collection.      IMPRESSION:   1.  Lumbar osteomyelitis/discitis   2.  Positive blood culture, likely contaminant      PLAN:   Yonis Ward is a 52 y.o. man with no prior medical history admitted on 10/19/2022 secondary to persistent and worsening lower back pain since August without any inciting events, trauma or injury.  MRI of the lumbar spine shows L4-L5 discitis, osteomyelitis and epidural and prevertebral phlegmon with moderate central canal stenosis.  But no well-defined fluid collection.  He was evaluated by neurosurgery who did not feel he was a surgical candidate given normal physical exam.  Awaiting IR biopsy today    -Continue IV vancomycin and ceftriaxone, however will increase ceftriaxone dose to 2 g daily  -Monitor renal function and Vanco trough  -Awaiting IR biopsy  -Repeat blood cultures ordered x2  -Anticipate a PICC line and 6-week course of IV antibiotics    Disposition: TBD    Need for PICC line: Yes      Plan of care discussed with FATMATA Xie M.D.. Will continue to follow    Isabel Moreno M.D.      Please note that this dictation was created using voice  recognition software. I have worked with technical experts from [facility] to optimize the interface.  I have made every reasonable attempt to correct obvious errors, but there may be errors of grammar and possibly content that I did not discover before finalizing the note.

## 2022-10-22 LAB
FUNGUS SPEC FUNGUS STN: NORMAL
FUNGUS SPEC FUNGUS STN: NORMAL
RHODAMINE-AURAMINE STN SPEC: NORMAL
RHODAMINE-AURAMINE STN SPEC: NORMAL
SIGNIFICANT IND 70042: NORMAL
SITE SITE: NORMAL
SOURCE SOURCE: NORMAL
VANCOMYCIN PEAK SERPL-MCNC: 19.1 UG/ML (ref 20–40)
VANCOMYCIN TROUGH SERPL-MCNC: 5.6 UG/ML (ref 10–20)

## 2022-10-22 PROCEDURE — A9270 NON-COVERED ITEM OR SERVICE: HCPCS | Performed by: STUDENT IN AN ORGANIZED HEALTH CARE EDUCATION/TRAINING PROGRAM

## 2022-10-22 PROCEDURE — 700105 HCHG RX REV CODE 258: Performed by: STUDENT IN AN ORGANIZED HEALTH CARE EDUCATION/TRAINING PROGRAM

## 2022-10-22 PROCEDURE — 99233 SBSQ HOSP IP/OBS HIGH 50: CPT | Performed by: INTERNAL MEDICINE

## 2022-10-22 PROCEDURE — 700102 HCHG RX REV CODE 250 W/ 637 OVERRIDE(OP): Performed by: STUDENT IN AN ORGANIZED HEALTH CARE EDUCATION/TRAINING PROGRAM

## 2022-10-22 PROCEDURE — 700111 HCHG RX REV CODE 636 W/ 250 OVERRIDE (IP): Performed by: INTERNAL MEDICINE

## 2022-10-22 PROCEDURE — 700111 HCHG RX REV CODE 636 W/ 250 OVERRIDE (IP): Performed by: STUDENT IN AN ORGANIZED HEALTH CARE EDUCATION/TRAINING PROGRAM

## 2022-10-22 PROCEDURE — 770001 HCHG ROOM/CARE - MED/SURG/GYN PRIV*

## 2022-10-22 PROCEDURE — 36415 COLL VENOUS BLD VENIPUNCTURE: CPT

## 2022-10-22 PROCEDURE — 80202 ASSAY OF VANCOMYCIN: CPT | Mod: 91

## 2022-10-22 RX ADMIN — VANCOMYCIN HYDROCHLORIDE 750 MG: 500 INJECTION, POWDER, LYOPHILIZED, FOR SOLUTION INTRAVENOUS at 05:07

## 2022-10-22 RX ADMIN — HYDROMORPHONE HYDROCHLORIDE 1 MG: 1 INJECTION, SOLUTION INTRAMUSCULAR; INTRAVENOUS; SUBCUTANEOUS at 19:02

## 2022-10-22 RX ADMIN — VANCOMYCIN HYDROCHLORIDE 750 MG: 500 INJECTION, POWDER, LYOPHILIZED, FOR SOLUTION INTRAVENOUS at 17:10

## 2022-10-22 RX ADMIN — HYDROMORPHONE HYDROCHLORIDE 1 MG: 1 INJECTION, SOLUTION INTRAMUSCULAR; INTRAVENOUS; SUBCUTANEOUS at 07:11

## 2022-10-22 RX ADMIN — CEFTRIAXONE SODIUM 2 G: 10 INJECTION, POWDER, FOR SOLUTION INTRAVENOUS at 17:10

## 2022-10-22 RX ADMIN — HYDROCODONE BITARTRATE AND ACETAMINOPHEN 1 TABLET: 5; 325 TABLET ORAL at 14:08

## 2022-10-22 RX ADMIN — HYDROCODONE BITARTRATE AND ACETAMINOPHEN 2 TABLET: 5; 325 TABLET ORAL at 21:18

## 2022-10-22 RX ADMIN — POLYETHYLENE GLYCOL 3350 1 PACKET: 17 POWDER, FOR SOLUTION ORAL at 15:24

## 2022-10-22 RX ADMIN — HYDROMORPHONE HYDROCHLORIDE 1 MG: 1 INJECTION, SOLUTION INTRAMUSCULAR; INTRAVENOUS; SUBCUTANEOUS at 00:19

## 2022-10-22 ASSESSMENT — PAIN DESCRIPTION - PAIN TYPE
TYPE: ACUTE PAIN

## 2022-10-22 ASSESSMENT — ENCOUNTER SYMPTOMS
BLOOD IN STOOL: 0
NERVOUS/ANXIOUS: 0
EYE REDNESS: 0
LOSS OF CONSCIOUSNESS: 0
HEMOPTYSIS: 0
PALPITATIONS: 0
SEIZURES: 0
ABDOMINAL PAIN: 0
MYALGIAS: 0
TREMORS: 0
VOMITING: 0
WHEEZING: 0
SHORTNESS OF BREATH: 0
CONSTIPATION: 0
NAUSEA: 0
CHILLS: 0
DIZZINESS: 0
DIARRHEA: 0
FALLS: 0
BACK PAIN: 1
FOCAL WEAKNESS: 0
HEADACHES: 0
COUGH: 0
INSOMNIA: 0
WEAKNESS: 0
FEVER: 0
EYE PAIN: 0

## 2022-10-22 NOTE — PROGRESS NOTES
"Hospital Medicine Daily Progress Note    Date of Service  10/22/2022    Chief Complaint  Yonis Ward is a 52 y.o. male admitted 10/19/2022 with Back Pain (Severe lower back pain that has steadily gotten worse for the last two months. Denies injury, numbness/tingling. Occasional weakness down left leg. PIV placed with EMS, pt received 100mcg fentanyl and 1mg versed )      Hospital Course  No notes on file  Yonis Ward is a 52 y.o. male without any chronic medical conditions not on any long-term medications who presented 10/19/2022 with severe low back pain.  Patient first began experiencing back pain in mid August 2022.  No trauma or inciting events, he reports he just woke up one evening and his back \"locked up\" and he was in a lot of pain.  Does work as a [employer]  but again does not recall any specific inciting event.  His back pain is waxed and waned over the past 2 months from mild to severe.  He has no weakness or numbness or tingling, no saddle anesthesia, no bowel or bladder incontinence.  Denies any fevers or chills, headache or vision changes, chest pain dyspnea cough nausea or vomiting.  He has had poor p.o. intake due to ongoing back pain and he is lost approximately 20 pounds over the last few months.  He denies any spine or vertebral injections, denies IV drug use.  No history of infections or immunocompromised state.  He has tried chiropractic care, hot baths, ibuprofen, naproxen, cold packs, Flexeril, narcotics all without significant relief.  He presents because he has had acutely worsening pain over the past 2 weeks now severe impacting his ability to ambulate.     In ED he is afebrile pulse is ranged from  normal respiratory rate and room air oxygen saturation blood pressures range from 130s to 160s.  Initial work-up WBC 10.4, hemoglobin 11.7 normal platelets, sodium 134 otherwise normal electrolytes renal function and liver function, lactic acid 0.9 lumbar x-ray showed " "acute discitis osteomyelitis at the L4-5 level recommend MRI scan of lumbar spine with contrast.  MRI ordered stat preliminary read shows L4-5 discitis/osteomyelitis with ventral epidural abscess.  ERP discussed case with Dr. Tinajero from neurosurgery who agrees to evaluate the patient.  Patient was started on Unasyn and vancomycin. \"    Dr. Rasmussen  Interval Problem Update  10/20. Neurosurgery recommend CT biopsy of L4-5. I called IR and ordered the consult. They would like blood cultures officially negative before the biopsy. Meanwhile patient denies saddle anesthesia, foot drops, paralysis or bowel/bladder spillage.  10/21. Discussed with ID  Planned  Patient has back discomfort otherwisehe does NOT have saddle anesthesia, urine or fecal spillage, pareses/paralysis or foot drops, or compressive symptoms.  10/22. Bone biopsy results MRSA  On vancocin  COntinue current treatment  Follow repeat blood cultures    I have discussed this patient's plan of care and discharge plan at IDT rounds today with Case Management, Nursing, Nursing leadership, and other members of the IDT team.    Consultants/Specialty  Neurosurgery    Code Status  Full Code    Disposition  Patient is not medically cleared for discharge.   Anticipate discharge to  TBD .  I have placed the appropriate orders for post-discharge needs.    Review of Systems  Review of Systems   Constitutional:  Negative for chills and fever.   HENT:  Negative for congestion, hearing loss and nosebleeds.    Eyes:  Negative for pain and redness.   Respiratory:  Negative for cough, hemoptysis, shortness of breath and wheezing.    Cardiovascular:  Negative for chest pain and palpitations.   Gastrointestinal:  Negative for abdominal pain, blood in stool, constipation, diarrhea, nausea and vomiting.   Genitourinary:  Negative for dysuria, frequency and hematuria.   Musculoskeletal:  Positive for back pain. Negative for falls, joint pain and myalgias.   Skin:  Negative for " rash.   Neurological:  Negative for dizziness, tremors, focal weakness, seizures, loss of consciousness, weakness and headaches.   Psychiatric/Behavioral:  The patient is not nervous/anxious and does not have insomnia.    All other systems reviewed and are negative.     Physical Exam  Temp:  [36.1 °C (97 °F)-37.1 °C (98.8 °F)] 36.4 °C (97.6 °F)  Pulse:  [] 86  Resp:  [16-18] 18  BP: (136-158)/() 136/98  SpO2:  [94 %-98 %] 95 %    Physical Exam  Vitals and nursing note reviewed.   Constitutional:       Comments: Thin   HENT:      Head: Normocephalic and atraumatic.      Right Ear: External ear normal.      Left Ear: External ear normal.      Nose: Nose normal.      Mouth/Throat:      Mouth: Mucous membranes are moist.   Eyes:      General: No scleral icterus.     Conjunctiva/sclera: Conjunctivae normal.   Cardiovascular:      Rate and Rhythm: Normal rate and regular rhythm.      Heart sounds: No murmur heard.    No friction rub. No gallop.   Pulmonary:      Effort: Pulmonary effort is normal.      Breath sounds: Normal breath sounds.   Abdominal:      General: Abdomen is flat. Bowel sounds are normal. There is no distension.      Palpations: Abdomen is soft.      Tenderness: There is no abdominal tenderness. There is no guarding.   Musculoskeletal:         General: Tenderness (low back, moderate intensity unchanged) present. Normal range of motion.      Cervical back: Normal range of motion and neck supple.   Skin:     General: Skin is warm.   Neurological:      Mental Status: He is alert and oriented to person, place, and time. Mental status is at baseline.   Psychiatric:         Mood and Affect: Mood normal.         Behavior: Behavior normal.         Thought Content: Thought content normal.         Judgment: Judgment normal.       Fluids    Intake/Output Summary (Last 24 hours) at 10/22/2022 1641  Last data filed at 10/22/2022 1408  Gross per 24 hour   Intake 360 ml   Output 400 ml   Net -40 ml          Laboratory  Recent Labs     10/19/22  2051 10/20/22  0155 10/21/22  0426   WBC 10.4 9.4 9.1   RBC 3.96* 4.07* 3.91*   HEMOGLOBIN 11.7* 11.9* 11.6*   HEMATOCRIT 35.8* 36.7* 35.1*   MCV 90.4 90.2 89.8   MCH 29.5 29.2 29.7   MCHC 32.7* 32.4* 33.0*   RDW 45.1 45.7 45.1   PLATELETCT 402 370 449*   MPV 7.9* 8.9* 8.2*       Recent Labs     10/19/22  2051 10/20/22  0402 10/21/22  0426   SODIUM 134* 132* 133*   POTASSIUM 4.0 3.9 4.1   CHLORIDE 99 98 97   CO2 23 19* 23   GLUCOSE 87 82 89   BUN 13 16 11   CREATININE 0.59 0.57 0.60   CALCIUM 8.9 8.7 8.9       Recent Labs     10/21/22  0426   INR 1.11                 Imaging  CT-NEEDLE BX-DEEP BONE   Final Result      1.  CT GUIDED Lumbar DEEP BONE BIOPSY OF THE L4-5 DISC SPACE AND ENDPLATES. SPECIMENS SUBMITTED IN SALINE FOR CULTURE AND SENSITIVITY, GRAM STAIN, AFB, AND FUNGAL CULTURES.      2. CT-GUIDED RETROPERITONEAL BIOPSY OF LEFT-SIDED PARAVERTEBRAL PHLEGMON BORDERING THE LEFT PSOAS MUSCLE AS SEEN ON MRI. SOFT TISSUE CORE BIOPSIES WERE SUBMITTED IN SALINE TO BE MACERATED FOR CULTURES.      MR-LUMBAR SPINE-WITH & W/O   Final Result      1.  L4-5 discitis.   2.  L4 and L5 osteomyelitis.   3.  Epidural and prevertebral phlegmon at the level of L4-5.   4.  Moderate central canal stenosis at L4-5.   5.  Severe bilateral L4 and L5 degenerative neural foraminal stenosis.   6.  There is no well-defined fluid collection.      DX-LUMBAR SPINE-2 OR 3 VIEWS   Final Result      1.  Acute discitis/osteomyelitis at the L4-5 level. Recommend MRI scan of the lumbar spine with gadolinium enhancement for further evaluation.      2.  These findings were discussed with AKASH JOSE at 9:20 PM 10/19/2022             Assessment/Plan  * Osteomyelitis of vertebra, epidural abscess (HCC)- (present on admission)  Assessment & Plan  X-ray showed acute discitis osteomyelitis to the L4-5 level  MRI with contrast showed L4-5 discitis/osteo with ventral epidural abscess on preliminary  "read.  On Unasyn and vancomycin.  Follow blood cultures.  CRP 8.96, check ESR.  Neurosurgery consulted.  Recommend ID consult in a.m.\"    1 of 2 GPR blood cultures likely contaminant  Repeat blood cultures ordered  CT guided biopsy today  COnsulted ID  Continue current antibiotics  Neurosurgery following.   Follow culture and sensitivities    Epidural abscess- (present on admission)  Assessment & Plan  MRI with contrast showed L4-5 discitis/osteomyelitis with ventral epidural abscess on preliminary read.  Follow-up official read.  ERP discussed with neurosurgery Dr. Tinajero who will evaluate patient.  Unasyn and vancomycin.  Follow blood cultures.  ID consult in a.m..  Unclear etiology no injections, no current or prior IV drug use, no history of significant infections or bacteremia.\"    As above    Elevated blood-pressure reading without diagnosis of hypertension- (present on admission)  Assessment & Plan  Likely elevated secondary to pain and anxiety with new hospital admission.  IV antihypertensives ordered with parameters, if remains persistently elevated would benefit from initiating p.o. regimen.    Intractable back pain- (present on admission)  Assessment & Plan  Found to have L4-5 discitis/osteomyelitis with ventral epidural abscess on preliminary read.  Pain control.  Follow neurosurgery recommendations.    Anemia- (present on admission)  Assessment & Plan  No reports of hemorrhage, continue to monitor.  Transfuse hemoglobin less than 7\"    Recent Labs     10/19/22  2051 10/20/22  0155 10/21/22  0426   HEMOGLOBIN 11.7* 11.9* 11.6*   HEMATOCRIT 35.8* 36.7* 35.1*   MCV 90.4 90.2 89.8   MCH 29.5 29.2 29.7   PLATELETCT 402 370 449*     No active bleeding       VTE prophylaxis: SCDs/TEDs    I have performed a physical exam and reviewed and updated ROS and Plan today (10/22/2022). In review of yesterday's note (10/21/2022), there are no changes except as documented above.        "

## 2022-10-22 NOTE — PROGRESS NOTES
Patient alert and oriented x4, vss, complains of pain to low and bilateral legs- patient receieves relief with PRN pain meds. Patient had bone biopsy 10/21, continues on IV antibiotics. Unable to ambulate due to pain. Will monitor.

## 2022-10-22 NOTE — PROGRESS NOTES
Pt presents to IR CT . Pt was consented by MD at bedside, confirmed by this RN and consent at bedside. Pt transferred to CT table in prone position. Patient underwent a L4-L5 biopsy and possible perispinous phlegmon sampling by Dr. Reyes. Procedure site was marked by MD and verified using imaging guidance. Pt placed on monitor, prepped and draped in a sterile fashion. Vitals were taken every 5 minutes and remained stable during procedure (see doc flow sheet for results). CO2 waveform capnography was monitored and remained WNL throughout procedure.     Report called to Betzy GIBSON. Pt transported by deondre with RN to [id number]. [facility] for L4-L5 AFB, fungal, gram stain; for perispinal phlegmon AFB, fungal, gram stain

## 2022-10-22 NOTE — CARE PLAN
The patient is Stable - Low risk of patient condition declining or worsening    Shift Goals  Clinical Goals: Comfort  Patient Goals: Pain management    Progress made toward(s) clinical / shift goals:  Pt is resting     Patient is not progressing towards the following goals:

## 2022-10-22 NOTE — CARE PLAN
The patient is Stable - Low risk of patient condition declining or worsening    Shift Goals  Clinical Goals: pain control  Patient Goals: pain control    Progress made toward(s) clinical / shift goals:  patient pain controlled with PRN pain meds, patient skin integrity maintained as patient able to move in bed. Biopsy site is C,D,&I.    Patient is not progressing towards the following goals:      Problem: Pain - Standard  Goal: Alleviation of pain or a reduction in pain to the patient’s comfort goal  Outcome: Progressing     Problem: Skin Integrity  Goal: Skin integrity is maintained or improved  Outcome: Progressing

## 2022-10-22 NOTE — PROGRESS NOTES
Infectious Disease Progress Note    Author: Isabel Moreno M.D. Date & Time of service: 10/22/2022  7:47 AM    Chief Complaint:  Follow-up for vertebral osteomyelitis/discitis    Interval History:  10/22 afebrile, patient underwent disc base biopsy of L4-5 and CT guided biopsy of paravertebral soft tissue phlegmon yesterday.  Cultures pending.  Patient sleeping and not arousable to voice      Labs Reviewed and Medications Reviewed.    Review of Systems:  Review of Systems   Unable to perform ROS: Other   Patient sleeping and not arousable to voice      Hemodynamics:  Temp (24hrs), Av.7 °C (98 °F), Min:36.1 °C (97 °F), Max:37.3 °C (99.1 °F)  Temperature: 37.1 °C (98.8 °F)  Pulse  Av.4  Min: 79  Max: 104   Blood Pressure: (!) 158/97       Physical Exam:  Physical Exam  Vitals and nursing note reviewed.   Constitutional:       Appearance: Normal appearance.   Eyes:      Comments: Eyes closed   Cardiovascular:      Rate and Rhythm: Normal rate.   Pulmonary:      Effort: Pulmonary effort is normal.   Skin:     General: Skin is warm and dry.   Neurological:      Comments: Sleeping and not arousable to voice       Meds:    Current Facility-Administered Medications:     cefTRIAXone (ROCEPHIN) IV    MD Alert...Vancomycin per Pharmacy    simethicone    calcium carbonate    vancomycin    senna-docusate **AND** polyethylene glycol/lytes **AND** magnesium hydroxide **AND** bisacodyl    acetaminophen    labetalol    ondansetron    ondansetron    promethazine    promethazine    prochlorperazine    HYDROcodone-acetaminophen    HYDROmorphone    Labs:  Recent Labs     10/19/22  2051 10/20/22  0155 10/21/22  0426   WBC 10.4 9.4 9.1   RBC 3.96* 4.07* 3.91*   HEMOGLOBIN 11.7* 11.9* 11.6*   HEMATOCRIT 35.8* 36.7* 35.1*   MCV 90.4 90.2 89.8   MCH 29.5 29.2 29.7   RDW 45.1 45.7 45.1   PLATELETCT 402 370 449*   MPV 7.9* 8.9* 8.2*   NEUTSPOLYS 83.30* 84.00*  --    LYMPHOCYTES 8.80* 8.50*  --    MONOCYTES 6.70 6.70  --     EOSINOPHILS 0.50 0.20  --    BASOPHILS 0.20 0.20  --      Recent Labs     10/19/22  2051 10/20/22  0402 10/21/22  0426   SODIUM 134* 132* 133*   POTASSIUM 4.0 3.9 4.1   CHLORIDE 99 98 97   CO2 23 19* 23   GLUCOSE 87 82 89   BUN 13 16 11     Recent Labs     10/19/22  2051 10/20/22  0402 10/21/22  0426   ALBUMIN 3.7  --  3.3   TBILIRUBIN 0.6  --   --    ALKPHOSPHAT 81  --   --    TOTPROTEIN 7.6  --   --    ALTSGPT 10  --   --    ASTSGOT 16  --   --    CREATININE 0.59 0.57 0.60       Imaging:  CT-NEEDLE BX-DEEP BONE    Result Date: 10/21/2022  10/21/2022 5:02 PM HISTORY/REASON FOR EXAM:  L4-5 discitis, osteomyelitis, ventral epidural abscess, paravertebral phlegmon along the medial aspects of the psoas muscles.  No devon psoas abscess. Blood cultures inconclusive, gram-negative chico thought to be a contaminant. TECHNIQUE/EXAM DESCRIPTION: 1.  Lumbar deep bone biopsy (L4-5 disc and endplate bone biopsy) with CT guidance. 2.  CT-guided retroperitoneal biopsy left paravertebral phlegmon along the medial aspect of the left psoas muscle Low dose optimization technique was utilized for this CT exam including automated exposure control and adjustment of the mA and/or kV according to patient size. ALL ELEMENTS OF MAXIMAL STERILE BARRIER TECHNIQUE WERE FOLLOWED. PROCEDURE: Informed consent was obtained. Moderate sedation was provided. Pulse oximetry and continuous cardiac monitoring by the nurse was performed throughout the exam. Intraservice time was 30 minutes. Localizing CT images were obtained with the patient in prone position. The skin was prepped with ChloraPrep and draped in a sterile fashion. Following local anesthesia with 1% Lidocaine, a 13 G Bingham bone biopsy needle was placed and needle position confirmed with CT. 14-gauge trephine bone biopsy cores were obtained x2 from the L4-5 disc disc space and endplates. Next, using coaxial technique, an 18 G quick core biopsy needle was placed into the L4-5 disc space and  needle position confirmed with CT. 18-gauge core biopsies were obtained x2. These 4 specimens were also submitted in saline for culture and sensitivity, Gram stain, AFB, and fungal cultures. Next, retroperitoneal biopsy was performed. Following local anesthesia with 3 mL 1% lidocaine, a 17-gauge guiding needle was advanced from a left paraspinous approach into the left paravertebral soft tissues between the medial aspect of the left psoas muscle and vertebral body margin. Using coaxial technique, 18-gauge core biopsies x2 were obtained. These specimens were submitted in a separate test tube with saline and also submitted for culture and sensitivity, Gram stain, AFB, and fungal culture. The guiding needles were removed and limited CT images obtained through the biopsy site show no evidence of significant hemorrhage. The patient tolerated the procedure well. COMPARISON: MRI lumbar spine 10/19/2022 FINDINGS: The localizing CT images show satisfactory needle position within the target lesion. In the left paravertebral soft tissues for the phlegmon biopsy.     1.  CT GUIDED Lumbar DEEP BONE BIOPSY OF THE L4-5 DISC SPACE AND ENDPLATES. SPECIMENS SUBMITTED IN SALINE FOR CULTURE AND SENSITIVITY, GRAM STAIN, AFB, AND FUNGAL CULTURES. 2. CT-GUIDED RETROPERITONEAL BIOPSY OF LEFT-SIDED PARAVERTEBRAL PHLEGMON BORDERING THE LEFT PSOAS MUSCLE AS SEEN ON MRI. SOFT TISSUE CORE BIOPSIES WERE SUBMITTED IN SALINE TO BE MACERATED FOR CULTURES.    DX-LUMBAR SPINE-2 OR 3 VIEWS    Result Date: 10/19/2022  10/19/2022 9:10 PM HISTORY/REASON FOR EXAM:  Severe low back pain x2 months. TECHNIQUE/ EXAM DESCRIPTION AND NUMBER OF VIEWS:  2 views of the lumbar spine. COMPARISON: None. FINDINGS: There are acute erosive changes involving the inferior endplate of the L4 vertebral body and superior endplate of the L5 vertebral body. There is also irregularity of the disc space at the L4-5 level. The bony trabecular pattern is normal.  The lumbar  vertebral bodies have a normal height and alignment.   There is no evidence of spondylolisthesis or osseous lesion.  The posterior elements appear grossly normal on the limited series.     1.  Acute discitis/osteomyelitis at the L4-5 level. Recommend MRI scan of the lumbar spine with gadolinium enhancement for further evaluation. 2.  These findings were discussed with AKASH JOSE at 9:20 PM 10/19/2022    MR-LUMBAR SPINE-WITH & W/O    Result Date: 10/20/2022  10/19/2022 10:47 PM HISTORY/REASON FOR EXAM:  Severe back pain. TECHNIQUE/EXAM DESCRIPTION: MRI of the lumbar spine without and with contrast. The study was performed on a Sendio Signa 1.5 Taina MRI scanner. T1 sagittal, T2 fast spin-echo sagittal, and T2 axial images were obtained of the lumbar spine. T1 post-contrast fat-suppressed sagittal images were obtained. 10 mL ProHance contrast was administered intravenously. COMPARISON:  None. FINDINGS: There is destruction of L4-5 disc space with fluid collection. There are edema and contrast enhancement noted involving L4 and L5 vertebral bodies. There is epidural and prevertebral phlegmon noted surrounding the L4-5 disc space. There is no well-defined abscess. There is moderate central canal stenosis at this level. There is severe bilateral L5 neural foraminal stenosis. At the level of L5-S1,there is disc degeneration. Bilateral facet joint arthropathy is seen. There is severe bilateral neural foraminal stenosis. At the level of L3-4,there is no spinal or neural foraminal stenosis. At the level of L2-3,there is no spinal or neural foraminal stenosis. At the level of L1-2,there is no spinal or neural foraminal stenosis. The conus terminates at the level of L1 the visualized lower thoracic spinal cord is unremarkable. There is no lumbar intradural lesion. There is an approximately 2.7 cm a cyst in the right kidney.     1.  L4-5 discitis. 2.  L4 and L5 osteomyelitis. 3.  Epidural and prevertebral phlegmon at the  level of L4-5. 4.  Moderate central canal stenosis at L4-5. 5.  Severe bilateral L4 and L5 degenerative neural foraminal stenosis. 6.  There is no well-defined fluid collection.      Micro:  Results       Procedure Component Value Units Date/Time    GRAM STAIN [[id number]] Collected: 10/21/22 1730    Order Status: Completed Specimen: Tissue Updated: 10/22/22 0349     Significant Indicator .     Source TISS     Site L4-L5     Gram Stain Result No organisms seen.    GRAM STAIN [[id number]] Collected: 10/21/22 1730    Order Status: Completed Specimen: Tissue Updated: 10/22/22 0349     Significant Indicator .     Source TISS     Site Perispinous phlegmon     Gram Stain Result No organisms seen.    Fungal Smear [[id number]] Collected: 10/21/22 1730    Order Status: Completed Specimen: Tissue Updated: 10/22/22 0349     Significant Indicator NEG     Source TISS     Site L4-L5     Fungal Smear Results No fungal elements seen.    Fungal Smear [[id number]] Collected: 10/21/22 1730    Order Status: Completed Specimen: Tissue Updated: 10/22/22 0349     Significant Indicator NEG     Source TISS     Site Perispinous phlegmon     Fungal Smear Results No fungal elements seen.    CULTURE TISSUE W/ GRM STAIN [[id number]] Collected: 10/21/22 1730    Order Status: No result Specimen: Tissue Updated: 10/22/22 0348     Significant Indicator NEG     Source TISS     Site Perispinous phlegmon     Culture Result -     Gram Stain Result No organisms seen.    Fungal Culture [[id number]] Collected: 10/21/22 1730    Order Status: No result Specimen: Tissue Updated: 10/22/22 0348     Significant Indicator NEG     Source TISS     Site Perispinous phlegmon     Culture Result -     Fungal Smear Results No fungal elements seen.    AFB Culture [[id number]] Collected: 10/21/22 1730    Order Status: No result Specimen: Tissue Updated: 10/22/22 0348     Significant Indicator NEG     Source TISS     Site Perispinous phlegmon     Culture Result -     AFB Smear  "Results -    CULTURE TISSUE W/ GRM STAIN [[id number]] Collected: 10/21/22 1730    Order Status: No result Specimen: Tissue Updated: 10/22/22 0348     Significant Indicator NEG     Source TISS     Site L4-L5     Culture Result -     Gram Stain Result No organisms seen.    Fungal Culture [[id number]] Collected: 10/21/22 1730    Order Status: No result Specimen: Tissue Updated: 10/22/22 0348     Significant Indicator NEG     Source TISS     Site L4-L5     Culture Result -     Fungal Smear Results No fungal elements seen.    AFB Culture [[id number]] Collected: 10/21/22 1730    Order Status: No result Specimen: Tissue Updated: 10/22/22 0348     Significant Indicator NEG     Source TISS     Site L4-L5     Culture Result -     AFB Smear Results -    FLUID CULTURE W/GRAM STAIN [[id number]]     Order Status: No result Specimen: Other Body Fluid     Fungal Culture [[id number]]     Order Status: No result Specimen: Other Body Fluid     AFB Culture [[id number]]     Order Status: No result Specimen: Body Fluid from Abscess     FLUID CULTURE W/GRAM STAIN [[id number]]     Order Status: No result Specimen: Bone from Other Body Fluid     Fungal Culture [[id number]]     Order Status: No result Specimen: Bone     AFB Culture [[id number]]     Order Status: No result Specimen: Bone     Fungal Culture [[id number]]     Order Status: No result Specimen: Respirate from Bone     MRSA By PCR (Amp) [[id number]] Collected: 10/21/22 1242    Order Status: Completed Specimen: Respirate from Nares Updated: 10/21/22 1708     MRSA by PCR Negative    Narrative:      Collected By: [id number] ISACC GRAY    BLOOD CULTURE [[id number]] Collected: 10/21/22 1251    Order Status: Sent Specimen: Blood from Peripheral Updated: 10/21/22 1320    Narrative:      Per Hospital Policy: Only change Specimen Src: to \"Line\" if  specified by physician order.    BLOOD CULTURE [[id number]] Collected: 10/21/22 1251    Order Status: Sent Specimen: Blood from Peripheral Updated: " "10/21/22 1319    Narrative:      Per Hospital Policy: Only change Specimen Src: to \"Line\" if  specified by physician order.    Blood Culture [[id number]]  (Abnormal) Collected: 10/19/22 2157    Order Status: Completed Specimen: Blood from Peripheral Updated: 10/21/22 1021     Significant Indicator POS     Source BLD     Site Peripheral     Culture Result Growth detected by Bactec instrument. 10/20/2022  23:03      Bacillus species  Possible Contaminant  Isolated from one set only, please correlate with clinical  condition. Contact the Microbiology department within 48 hr  if identification and susceptibility are needed.      Narrative:      CALL  [facility]  61 tel. [phone],  CALLED  61 tel. [phone] 10/20/2022, 23:06, RB PERF. RESULTS CALLED  TO:[phone]  1 of 2 for Blood Culture x 2 sites order. Per Hospital  Policy: Only change Specimen Src: to \"Line\" if specified by  physician order.  No site indicated    Blood Culture [[id number]] Collected: 10/19/22 2239    Order Status: Completed Specimen: Blood from Peripheral Updated: 10/21/22 0812     Significant Indicator NEG     Source BLD     Site PERIPHERAL     Culture Result No Growth  Note: Blood cultures are incubated for 5 days and  are monitored continuously.Positive blood cultures  are called to the RN and reported as soon as  they are identified.      Narrative:      2 of 2 blood culture x2  Sites order. Per Hospital Policy:  Only change Specimen Src: to \"Line\" if specified by physician  order.  Right Hand            Assessment:  Active Hospital Problems    Diagnosis     *Osteomyelitis of vertebra, epidural abscess (HCC) [M46.20]     Epidural abscess [G06.2]     Anemia [D64.9]     Intractable back pain [M54.9]     Elevated blood-pressure reading without diagnosis of hypertension [R03.0]       52 y.o. man with no prior medical history admitted on 10/19/2022 secondary to persistent and worsening lower back pain since August without any inciting events, trauma or " injury.  MRI of the lumbar spine shows L4-L5 discitis, osteomyelitis and epidural and prevertebral phlegmon with moderate central canal stenosis.  But no well-defined fluid collection.  He was evaluated by neurosurgery who did not feel he was a surgical candidate given normal physical exam.  Patient is status post biopsy on 10/21    Pertinent diagnoses:  Lumbar osteomyelitis/discitis  Prevertebral phlegmon  Positive blood culture, likely contaminant    Plan:  -Continue IV vancomycin and ceftriaxone 2 g daily for now  -Monitor renal function and Vanco trough  - Status post biopsy of L4-L5 disc space and left paravertebral phlegmon on 10/21  -Follow cultures-pending  -Repeat blood cultures ordered x2 on 10/21-negative to date  -Okay with a PICC line once repeat blood cultures are negative for 48 hours   -Antibiotic selection pending culture results   -Will plan a 6-week course of IV antibiotics    Disposition: TBD    Need for PICC line: Yes

## 2022-10-22 NOTE — PROGRESS NOTES
Patient alert and oriented x4, back from biopsy. VSS, medicated for pain with PRN pain meds with relief. Patient dressing to site is CD&I. Will monitor.

## 2022-10-22 NOTE — PROGRESS NOTES
Received report and assumed care at shift change. A&O x 4, cooperative with cares. Medicated for pain per MAR. Patient was slightly emotional about his condition particularly when he was unable to get up to the bathroom. Continue with IV Vancomycin tolerating well. Bed locked and in lowest position. Needs attended to.

## 2022-10-22 NOTE — OR SURGEON
Immediate Post- Operative Note        Findings: 1) NEEDLE CONFIRMED AT L4-5 DISK SPACE  2) NEEDLE CONFIRMED AT LEFT PARAVERTEBRAL PHLEGMON      Procedure(s): 1) CT GUIDED DISK SPACE ENDPLATE BX L4-5  14G TREPHINE BONE BX CORES X 2 IN SALINE  18G  QUICK CORE DISK SPACE SPECIMENS X 2 IN SALINE    2) CT GUIDED LEFT PARAVERTEBRAL SOFT TISSUE PHLEGMON  (RETROPERITONEAL) BX  18G QUICK CORE BX X 2 IN SALINE    ALL SPECIMENS SUBMITTED FOR C+S, GRAM, AFB, FUNGAL      Estimated Blood Loss: Less than 2 ml        Complications: None            10/21/2022     5:53 PM     Roger Reyes M.D.

## 2022-10-23 DIAGNOSIS — M46.20 OSTEOMYELITIS OF VERTEBRA (HCC): ICD-10-CM

## 2022-10-23 PROBLEM — K59.00 CONSTIPATION: Status: ACTIVE | Noted: 2022-10-23

## 2022-10-23 PROBLEM — L02.91 PHLEGMON: Status: ACTIVE | Noted: 2022-10-20

## 2022-10-23 LAB
ALBUMIN SERPL BCP-MCNC: 3.4 G/DL (ref 3.2–4.9)
BACTERIA TISS AEROBE CULT: ABNORMAL
BUN SERPL-MCNC: 11 MG/DL (ref 8–22)
CALCIUM SERPL-MCNC: 8.8 MG/DL (ref 8.5–10.5)
CHLORIDE SERPL-SCNC: 100 MMOL/L (ref 96–112)
CO2 SERPL-SCNC: 25 MMOL/L (ref 20–33)
CREAT SERPL-MCNC: 0.61 MG/DL (ref 0.5–1.4)
ERYTHROCYTE [DISTWIDTH] IN BLOOD BY AUTOMATED COUNT: 44 FL (ref 35.9–50)
GFR SERPLBLD CREATININE-BSD FMLA CKD-EPI: 117 ML/MIN/1.73 M 2
GLUCOSE SERPL-MCNC: 104 MG/DL (ref 65–99)
GRAM STN SPEC: ABNORMAL
GRAM STN SPEC: ABNORMAL
HCT VFR BLD AUTO: 36.7 % (ref 42–52)
HGB BLD-MCNC: 12 G/DL (ref 14–18)
MCH RBC QN AUTO: 29 PG (ref 27–33)
MCHC RBC AUTO-ENTMCNC: 32.7 G/DL (ref 33.7–35.3)
MCV RBC AUTO: 88.6 FL (ref 81.4–97.8)
PHOSPHATE SERPL-MCNC: 3.5 MG/DL (ref 2.5–4.5)
PLATELET # BLD AUTO: 458 K/UL (ref 164–446)
PMV BLD AUTO: 8 FL (ref 9–12.9)
POTASSIUM SERPL-SCNC: 4.1 MMOL/L (ref 3.6–5.5)
RBC # BLD AUTO: 4.14 M/UL (ref 4.7–6.1)
SIGNIFICANT IND 70042: ABNORMAL
SIGNIFICANT IND 70042: ABNORMAL
SITE SITE: ABNORMAL
SITE SITE: ABNORMAL
SODIUM SERPL-SCNC: 136 MMOL/L (ref 135–145)
SOURCE SOURCE: ABNORMAL
SOURCE SOURCE: ABNORMAL
WBC # BLD AUTO: 5.5 K/UL (ref 4.8–10.8)

## 2022-10-23 PROCEDURE — 85027 COMPLETE CBC AUTOMATED: CPT

## 2022-10-23 PROCEDURE — A9270 NON-COVERED ITEM OR SERVICE: HCPCS | Performed by: STUDENT IN AN ORGANIZED HEALTH CARE EDUCATION/TRAINING PROGRAM

## 2022-10-23 PROCEDURE — 99233 SBSQ HOSP IP/OBS HIGH 50: CPT | Performed by: INTERNAL MEDICINE

## 2022-10-23 PROCEDURE — 80069 RENAL FUNCTION PANEL: CPT

## 2022-10-23 PROCEDURE — 700102 HCHG RX REV CODE 250 W/ 637 OVERRIDE(OP): Performed by: STUDENT IN AN ORGANIZED HEALTH CARE EDUCATION/TRAINING PROGRAM

## 2022-10-23 PROCEDURE — 700111 HCHG RX REV CODE 636 W/ 250 OVERRIDE (IP): Performed by: INTERNAL MEDICINE

## 2022-10-23 PROCEDURE — 770001 HCHG ROOM/CARE - MED/SURG/GYN PRIV*

## 2022-10-23 PROCEDURE — 700101 HCHG RX REV CODE 250

## 2022-10-23 PROCEDURE — 99233 SBSQ HOSP IP/OBS HIGH 50: CPT | Performed by: STUDENT IN AN ORGANIZED HEALTH CARE EDUCATION/TRAINING PROGRAM

## 2022-10-23 PROCEDURE — 700111 HCHG RX REV CODE 636 W/ 250 OVERRIDE (IP): Performed by: STUDENT IN AN ORGANIZED HEALTH CARE EDUCATION/TRAINING PROGRAM

## 2022-10-23 PROCEDURE — 700105 HCHG RX REV CODE 258: Performed by: INTERNAL MEDICINE

## 2022-10-23 PROCEDURE — A9270 NON-COVERED ITEM OR SERVICE: HCPCS

## 2022-10-23 PROCEDURE — 36415 COLL VENOUS BLD VENIPUNCTURE: CPT

## 2022-10-23 PROCEDURE — 700102 HCHG RX REV CODE 250 W/ 637 OVERRIDE(OP)

## 2022-10-23 RX ORDER — HYDROCODONE BITARTRATE AND ACETAMINOPHEN 5; 325 MG/1; MG/1
1-2 TABLET ORAL EVERY 4 HOURS PRN
Status: DISCONTINUED | OUTPATIENT
Start: 2022-10-23 | End: 2022-10-26

## 2022-10-23 RX ORDER — LIDOCAINE 50 MG/G
1 PATCH TOPICAL EVERY 24 HOURS
Status: DISCONTINUED | OUTPATIENT
Start: 2022-10-23 | End: 2022-10-26 | Stop reason: HOSPADM

## 2022-10-23 RX ORDER — GABAPENTIN 300 MG/1
300 CAPSULE ORAL 3 TIMES DAILY
Status: DISCONTINUED | OUTPATIENT
Start: 2022-10-23 | End: 2022-10-26 | Stop reason: HOSPADM

## 2022-10-23 RX ORDER — POLYETHYLENE GLYCOL 3350 17 G/17G
1 POWDER, FOR SOLUTION ORAL DAILY
Status: DISCONTINUED | OUTPATIENT
Start: 2022-10-23 | End: 2022-10-26 | Stop reason: HOSPADM

## 2022-10-23 RX ORDER — CYCLOBENZAPRINE HCL 10 MG
5 TABLET ORAL 3 TIMES DAILY PRN
Status: DISCONTINUED | OUTPATIENT
Start: 2022-10-23 | End: 2022-10-26 | Stop reason: HOSPADM

## 2022-10-23 RX ADMIN — HYDROCODONE BITARTRATE AND ACETAMINOPHEN 2 TABLET: 5; 325 TABLET ORAL at 10:30

## 2022-10-23 RX ADMIN — HYDROMORPHONE HYDROCHLORIDE 1 MG: 1 INJECTION, SOLUTION INTRAMUSCULAR; INTRAVENOUS; SUBCUTANEOUS at 17:36

## 2022-10-23 RX ADMIN — HYDROMORPHONE HYDROCHLORIDE 1 MG: 1 INJECTION, SOLUTION INTRAMUSCULAR; INTRAVENOUS; SUBCUTANEOUS at 00:00

## 2022-10-23 RX ADMIN — MAGNESIUM HYDROXIDE 30 ML: 400 SUSPENSION ORAL at 08:29

## 2022-10-23 RX ADMIN — HYDROCODONE BITARTRATE AND ACETAMINOPHEN 2 TABLET: 5; 325 TABLET ORAL at 20:09

## 2022-10-23 RX ADMIN — VANCOMYCIN HYDROCHLORIDE 1000 MG: 500 INJECTION, POWDER, LYOPHILIZED, FOR SOLUTION INTRAVENOUS at 09:00

## 2022-10-23 RX ADMIN — POLYETHYLENE GLYCOL 3350 1 PACKET: 17 POWDER, FOR SOLUTION ORAL at 10:31

## 2022-10-23 RX ADMIN — HYDROCODONE BITARTRATE AND ACETAMINOPHEN 2 TABLET: 5; 325 TABLET ORAL at 04:49

## 2022-10-23 RX ADMIN — GABAPENTIN 300 MG: 300 CAPSULE ORAL at 17:37

## 2022-10-23 RX ADMIN — CYCLOBENZAPRINE 5 MG: 10 TABLET, FILM COATED ORAL at 10:30

## 2022-10-23 RX ADMIN — GABAPENTIN 300 MG: 300 CAPSULE ORAL at 04:49

## 2022-10-23 RX ADMIN — HYDROMORPHONE HYDROCHLORIDE 1 MG: 1 INJECTION, SOLUTION INTRAMUSCULAR; INTRAVENOUS; SUBCUTANEOUS at 08:17

## 2022-10-23 RX ADMIN — GABAPENTIN 300 MG: 300 CAPSULE ORAL at 00:34

## 2022-10-23 RX ADMIN — GABAPENTIN 300 MG: 300 CAPSULE ORAL at 12:25

## 2022-10-23 RX ADMIN — VANCOMYCIN HYDROCHLORIDE 1000 MG: 500 INJECTION, POWDER, LYOPHILIZED, FOR SOLUTION INTRAVENOUS at 17:37

## 2022-10-23 RX ADMIN — VANCOMYCIN HYDROCHLORIDE 1000 MG: 500 INJECTION, POWDER, LYOPHILIZED, FOR SOLUTION INTRAVENOUS at 00:01

## 2022-10-23 RX ADMIN — CYCLOBENZAPRINE 5 MG: 10 TABLET, FILM COATED ORAL at 17:37

## 2022-10-23 RX ADMIN — LIDOCAINE 1 PATCH: 50 PATCH TOPICAL at 00:34

## 2022-10-23 RX ADMIN — SENNOSIDES AND DOCUSATE SODIUM 2 TABLET: 50; 8.6 TABLET ORAL at 04:50

## 2022-10-23 ASSESSMENT — ENCOUNTER SYMPTOMS
INSOMNIA: 0
BLOOD IN STOOL: 0
ABDOMINAL PAIN: 0
HEMOPTYSIS: 0
PALPITATIONS: 0
MYALGIAS: 1
TREMORS: 0
BACK PAIN: 1
HEADACHES: 0
NERVOUS/ANXIOUS: 0
VOMITING: 0
DIZZINESS: 0
WHEEZING: 0
EYE PAIN: 0
WEAKNESS: 0
FALLS: 0
LOSS OF CONSCIOUSNESS: 0
EYE REDNESS: 0
MYALGIAS: 0
DIARRHEA: 0
COUGH: 0
FEVER: 0
NAUSEA: 0
CONSTIPATION: 0
CHILLS: 0
SEIZURES: 0
FOCAL WEAKNESS: 0
SHORTNESS OF BREATH: 0

## 2022-10-23 ASSESSMENT — PAIN DESCRIPTION - PAIN TYPE
TYPE: ACUTE PAIN

## 2022-10-23 NOTE — FACE TO FACE
Face to Face Supporting Documentation - Home Health    The encounter with this patient was in whole or in part the primary reason for home health admission.    Date of encounter:   Patient:                    MRN:                       YOB: 2020  Yonis Ward  [id number]  1970     Home health to see patient for:  Skilled Nursing care for assessment, interventions & education, Home health aide, Physical Therapy evaluation and treatment, and Occupational therapy evaluation and treatment    Skilled need for:  Comment: Osteomyelitis    Skilled nursing interventions to include:  Comment: PT OT home effusion    Homebound status evidenced by:  Have a condition such that leaving his or her home is medically contraindicated. Leaving home requires a considerable and taxing effort. There is a normal inability to leave the home.    Community Physician to provide follow up care: Vasile Terrell III, M.D.     Optional Interventions? No      I certify the face to face encounter for this home health care referral meets the CMS requirements and the encounter/clinical assessment with the patient was, in whole, or in part, for the medical condition(s) listed above, which is the primary reason for home health care. Based on my clinical findings: the service(s) are medically necessary, support the need for home health care, and the homebound criteria are met.  I certify that this patient has had a face to face encounter by myself.  Niki Fernandez M.D. - NPI: [id number]

## 2022-10-23 NOTE — CARE PLAN
The patient is Stable - Low risk of patient condition declining or worsening    Shift Goals  Clinical Goals: pain control  Patient Goals: pain control    Progress made toward(s) clinical / shift goals:  Pain being managed with PRN pain meds.     Patient is not progressing towards the following goals: N/A

## 2022-10-23 NOTE — CARE PLAN
The patient is Stable - Low risk of patient condition declining or worsening    Shift Goals  Clinical Goals: pain control  Patient Goals: pain control    Progress made toward(s) clinical / shift goals: Patient progressing towards goals. Call light within reach. Bed alarm on.   Problem: Pain - Standard  Goal: Alleviation of pain or a reduction in pain to the patient’s comfort goal  Outcome: Progressing     Problem: Knowledge Deficit - Standard  Goal: Patient and family/care givers will demonstrate understanding of plan of care, disease process/condition, diagnostic tests and medications  Outcome: Progressing     Problem: Skin Integrity  Goal: Skin integrity is maintained or improved  Outcome: Progressing       Patient is not progressing towards the following goals:

## 2022-10-23 NOTE — PROGRESS NOTES
Pharmacy Vancomycin Kinetics Note for 10/22/2022     52 y.o. male on Vancomycin day # 3     Vancomycin Indication (Two level/Trough based Dosing): Epidural Abscess (goal trough 15-20)    Provider specified end date: 10/27/22    Active Antibiotics (From admission, onward)      Ordered     Ordering Provider       Sat Oct 22, 2022  5:41 PM    10/22/22 1741  vancomycin (VANCOCIN) 1,000 mg in  mL IVPB  (vancomycin (VANCOCIN) IV (LD + Maintenance))  EVERY 8 HOURS         Jacobo Xie M.D.       Fri Oct 21, 2022 10:41 AM    10/21/22 1041  cefTRIAXone (Rocephin) syringe 2 g  EVERY 24 HOURS         Isabel Moreno M.D.       Thu Oct 20, 2022  3:07 AM    10/20/22 0307  MD Alert...Vancomycin per Pharmacy  PHARMACY TO DOSE        Question:  Indication(s) for vancomycin?  Answer:  Epidural abscess/vertebral osteomyelitis    Jose Carlos Rasmussen M.D.            Dosing Weight: 54.4 kg (119 lb 14.9 oz)      Admission History: Admitted on 10/19/2022 for Osteomyelitis of vertebra (HCC) [M46.20]  Pertinent history: 52 year old male on Ceftriaxone and vancomycin for epidural abscess/vertebral osteomyelitis. Patient underwent biopsy of abscess on 10/21 with cultures showing light MRSA growth now on 10/22.    Allergies:     Patient has no known allergies.     Pertinent cultures to date:     Results       Procedure Component Value Units Date/Time    CULTURE TISSUE W/ GRM STAIN [[id number]]  (Abnormal) Collected: 10/21/22 1730    Order Status: Completed Specimen: Tissue Updated: 10/22/22 1704     Significant Indicator POS     Source TISS     Site L4-L5     Culture Result -     Gram Stain Result No organisms seen.     Culture Result Staphylococcus aureus  Light growth  Methicillin Resistant via screening method.      Fungal Culture [[id number]] Collected: 10/21/22 1730    Order Status: Completed Specimen: Tissue Updated: 10/22/22 1704     Significant Indicator NEG     Source TISS     Site L4-L5     Culture Result Culture in  "progress.     Fungal Smear Results No fungal elements seen.    AFB Culture [[id number]] Collected: 10/21/22 1730    Order Status: Completed Specimen: Tissue Updated: 10/22/22 1704     Significant Indicator NEG     Source TISS     Site L4-L5     Culture Result Culture in progress.     AFB Smear Results -    CULTURE TISSUE W/ GRM STAIN [[id number]]  (Abnormal) Collected: 10/21/22 1730    Order Status: Completed Specimen: Tissue Updated: 10/22/22 1704     Significant Indicator POS     Source TISS     Site Perispinous phlegmon     Culture Result -     Gram Stain Result No organisms seen.     Culture Result Staphylococcus aureus  Light growth  Methicillin Resistant via screening method.      Fungal Culture [[id number]] Collected: 10/21/22 1730    Order Status: Completed Specimen: Tissue Updated: 10/22/22 1704     Significant Indicator NEG     Source TISS     Site Perispinous phlegmon     Culture Result Culture in progress.     Fungal Smear Results No fungal elements seen.    AFB Culture [[id number]] Collected: 10/21/22 1730    Order Status: Completed Specimen: Tissue Updated: 10/22/22 1704     Significant Indicator NEG     Source TISS     Site Perispinous phlegmon     Culture Result Culture in progress.     AFB Smear Results -    BLOOD CULTURE [[id number]] Collected: 10/21/22 1251    Order Status: Completed Specimen: Blood from Peripheral Updated: 10/22/22 0752     Significant Indicator NEG     Source BLD     Site PERIPHERAL     Culture Result No Growth  Note: Blood cultures are incubated for 5 days and  are monitored continuously.Positive blood cultures  are called to the RN and reported as soon as  they are identified.      Narrative:      Per Hospital Policy: Only change Specimen Src: to \"Line\" if  specified by physician order.  Right Hand    BLOOD CULTURE [[id number]] Collected: 10/21/22 1251    Order Status: Completed Specimen: Blood from Peripheral Updated: 10/22/22 0752     Significant Indicator NEG     Source BLD     " "Site PERIPHERAL     Culture Result No Growth  Note: Blood cultures are incubated for 5 days and  are monitored continuously.Positive blood cultures  are called to the RN and reported as soon as  they are identified.      Narrative:      Per Hospital Policy: Only change Specimen Src: to \"Line\" if  specified by physician order.  Left AC    GRAM STAIN [[id number]] Collected: 10/21/22 1730    Order Status: Completed Specimen: Tissue Updated: 10/22/22 0349     Significant Indicator .     Source TISS     Site L4-L5     Gram Stain Result No organisms seen.    GRAM STAIN [[id number]] Collected: 10/21/22 1730    Order Status: Completed Specimen: Tissue Updated: 10/22/22 0349     Significant Indicator .     Source TISS     Site Perispinous phlegmon     Gram Stain Result No organisms seen.    Fungal Smear [[id number]] Collected: 10/21/22 1730    Order Status: Completed Specimen: Tissue Updated: 10/22/22 0349     Significant Indicator NEG     Source TISS     Site L4-L5     Fungal Smear Results No fungal elements seen.    Fungal Smear [[id number]] Collected: 10/21/22 1730    Order Status: Completed Specimen: Tissue Updated: 10/22/22 0349     Significant Indicator NEG     Source TISS     Site Perispinous phlegmon     Fungal Smear Results No fungal elements seen.    FLUID CULTURE W/GRAM STAIN [[id number]]     Order Status: No result Specimen: Other Body Fluid     Fungal Culture [[id number]]     Order Status: No result Specimen: Other Body Fluid     AFB Culture [[id number]]     Order Status: No result Specimen: Body Fluid from Abscess     FLUID CULTURE W/GRAM STAIN [[id number]]     Order Status: No result Specimen: Bone from Other Body Fluid     Fungal Culture [[id number]]     Order Status: No result Specimen: Bone     AFB Culture [[id number]]     Order Status: No result Specimen: Bone     Fungal Culture [[id number]]     Order Status: No result Specimen: Respirate from Bone     MRSA By PCR (Amp) [[id number]] Collected: 10/21/22 1242    Order " "Status: Completed Specimen: Respirate from Nares Updated: 10/21/22 1708     MRSA by PCR Negative    Narrative:      Collected By: [id number] DEXTER IKMLORETO GRAY    Blood Culture [[id number]]  (Abnormal) Collected: 10/19/22 2157    Order Status: Completed Specimen: Blood from Peripheral Updated: 10/21/22 1021     Significant Indicator POS     Source BLD     Site Peripheral     Culture Result Growth detected by Bactec instrument. 10/20/2022  23:03      Bacillus species  Possible Contaminant  Isolated from one set only, please correlate with clinical  condition. Contact the Microbiology department within 48 hr  if identification and susceptibility are needed.      Narrative:      CALL  [facility]  61 tel. [phone],  CALLED  61 tel. [phone] 10/20/2022, 23:06, RB PERF. RESULTS CALLED  TO:[id number]  1 of 2 for Blood Culture x 2 sites order. Per Hospital  Policy: Only change Specimen Src: to \"Line\" if specified by  physician order.  No site indicated    Blood Culture [[id number]] Collected: 10/19/22 2239    Order Status: Completed Specimen: Blood from Peripheral Updated: 10/21/22 0812     Significant Indicator NEG     Source BLD     Site PERIPHERAL     Culture Result No Growth  Note: Blood cultures are incubated for 5 days and  are monitored continuously.Positive blood cultures  are called to the RN and reported as soon as  they are identified.      Narrative:      2 of 2 blood culture x2  Sites order. Per Hospital Policy:  Only change Specimen Src: to \"Line\" if specified by physician  order.  Right Hand            Labs:     Estimated Creatinine Clearance: 113.8 mL/min (by C-G formula based on SCr of 0.6 mg/dL).  Recent Labs     10/19/22  2051 10/20/22  0155 10/21/22  0426   WBC 10.4 9.4 9.1   NEUTSPOLYS 83.30* 84.00*  --      Recent Labs     10/19/22  2051 10/20/22  0402 10/21/22  0426   BUN 13 16 11   CREATININE 0.59 0.57 0.60   ALBUMIN 3.7  --  3.3       Intake/Output Summary (Last 24 hours) at 10/22/2022 1742  Last data filed " "at 10/22/2022 1408  Gross per 24 hour   Intake 360 ml   Output 400 ml   Net -40 ml      BP (!) 136/98   Pulse 86   Temp 36.4 °C (97.6 °F) (Temporal)   Resp 18   Ht 1.676 m (5' 6\")   Wt 54.6 kg (120 lb 5.9 oz)   SpO2 95%  Temp (24hrs), Av.6 °C (97.9 °F), Min:36.1 °C (97 °F), Max:37.1 °C (98.8 °F)      List concerns for Vancomycin clearance:          Pharmacokinetics:     Two level kinetics:   Ke (hr ^-1): 0.1449  Half life: 4.8  Vd: Steady state Vd : 36.469  Calculated AUC: 282 mg·hr/L    Trough kinetics:   Recent Labs     10/22/22  0759 10/22/22  1627   VANCOTROUGH  --  5.6*   VANCOPEAK 19.1*  --        A/P:     -  Vancomycin dose: increased to 1000mg (18 mg/kg) Q8h    -  Next vancomycin level(s):    - No levels ordered at this time, likely obtain in about one week unless clinical status or renal function changes.    -  Predicted vancomycin AUC from two level test calculator: 565 mg·hr/L    -  Comments: No concerns for accumulation at this time. Initially planned for 1250mg Q12h, but this was greater than the institutional limit of 20 mg/kg for maintenance dosing. Pharmacy will continue to follow and adjust as clinically appropriate.     Melvin Eller, PharmD    "

## 2022-10-23 NOTE — PROGRESS NOTES
Hospital Medicine Daily Progress Note    Date of Service  10/23/2022    Chief Complaint  Yonis Ward is a 52 y.o. male admitted 10/19/2022 with Back Pain (Severe lower back pain that has steadily gotten worse for the last two months. Denies injury, numbness/tingling. Occasional weakness down left leg. PIV placed with EMS, pt received 100mcg fentanyl and 1mg versed )      Hospital Course  No notes on file  Yonis Ward is a 52 y.o. male without any chronic medical conditions not on any long-term medications who presented 10/19/2022 with severe low back pain.  Patient first began experiencing back pain in mid August 2022.  No trauma or inciting events,   His back pain is waxed and waned over the past 2 months from mild to severe.  He has no weakness or numbness or tingling, no saddle anesthesia, no bowel or bladder incontinence.      lumbar x-ray showed acute discitis osteomyelitis at the L4-5 level. MRI  shows L4-5 discitis/osteomyelitis and Prevertebral phlegmon    S/p L4-L5 disc biopsy and left paravertebral phlegmon 10/21      Interval Problem Update  Reported back pain, muscle spasm pain, ordered Flexeril    No bowel movement for 5 days, continue bowel management, suppository as needed    Wound culture positive for MRSA    S/p L4-L5 disc biopsy and left paravertebral phlegmon 10/21  Culture pending  Blood culture negative to date    ID following, continue IV vancomycin; plan for 6 weeks of IV antibiotics with end date 12/2    Plan for PICC line placement if her blood culture stays negative for 48 hours     patient will stay with his parents,         I have discussed this patient's plan of care and discharge plan at IDT rounds today with Case Management, Nursing, Nursing leadership, and other members of the IDT team.    Consultants/Specialty  Neurosurgery    Code Status  Full Code    Disposition  Patient is not medically cleared for discharge.   Anticipate discharge to  TBD .  I have placed the  appropriate orders for post-discharge needs.    Review of Systems  Review of Systems   Constitutional:  Negative for chills and fever.   HENT:  Negative for congestion, hearing loss and nosebleeds.    Eyes:  Negative for pain and redness.   Respiratory:  Negative for cough, hemoptysis, shortness of breath and wheezing.    Cardiovascular:  Negative for chest pain and palpitations.   Gastrointestinal:  Negative for abdominal pain, blood in stool, constipation, diarrhea, nausea and vomiting.   Genitourinary:  Negative for dysuria, frequency and hematuria.   Musculoskeletal:  Positive for back pain. Negative for falls, joint pain and myalgias.   Skin:  Negative for rash.   Neurological:  Negative for dizziness, tremors, focal weakness, seizures, loss of consciousness, weakness and headaches.   Psychiatric/Behavioral:  The patient is not nervous/anxious and does not have insomnia.    All other systems reviewed and are negative.     Physical Exam  Temp:  [36.1 °C (97 °F)-37.4 °C (99.4 °F)] 36.1 °C (97 °F)  Pulse:  [86-98] 98  Resp:  [18-20] 19  BP: (136-147)/() 137/93  SpO2:  [96 %-100 %] 98 %    Physical Exam  Vitals and nursing note reviewed.   Constitutional:       Comments: Thin   HENT:      Head: Normocephalic and atraumatic.      Right Ear: External ear normal.      Left Ear: External ear normal.      Nose: Nose normal.      Mouth/Throat:      Mouth: Mucous membranes are moist.   Eyes:      General: No scleral icterus.     Conjunctiva/sclera: Conjunctivae normal.   Cardiovascular:      Rate and Rhythm: Normal rate and regular rhythm.      Heart sounds: No murmur heard.    No friction rub. No gallop.   Pulmonary:      Effort: Pulmonary effort is normal.      Breath sounds: Normal breath sounds.   Abdominal:      General: Abdomen is flat. Bowel sounds are normal. There is no distension.      Palpations: Abdomen is soft.      Tenderness: There is no abdominal tenderness. There is no guarding.   Musculoskeletal:          General: Tenderness (low back, moderate intensity unchanged) present. Normal range of motion.      Cervical back: Normal range of motion and neck supple.   Skin:     General: Skin is warm.   Neurological:      Mental Status: He is alert and oriented to person, place, and time. Mental status is at baseline.   Psychiatric:         Mood and Affect: Mood normal.         Behavior: Behavior normal.         Thought Content: Thought content normal.         Judgment: Judgment normal.       Fluids    Intake/Output Summary (Last 24 hours) at 10/23/2022 1254  Last data filed at 10/23/2022 1250  Gross per 24 hour   Intake 1720 ml   Output 3200 ml   Net -1480 ml       Laboratory  Recent Labs     10/21/22  0426 10/23/22  0314   WBC 9.1 5.5   RBC 3.91* 4.14*   HEMOGLOBIN 11.6* 12.0*   HEMATOCRIT 35.1* 36.7*   MCV 89.8 88.6   MCH 29.7 29.0   MCHC 33.0* 32.7*   RDW 45.1 44.0   PLATELETCT 449* 458*   MPV 8.2* 8.0*     Recent Labs     10/21/22  0426 10/23/22  0314   SODIUM 133* 136   POTASSIUM 4.1 4.1   CHLORIDE 97 100   CO2 23 25   GLUCOSE 89 104*   BUN 11 11   CREATININE 0.60 0.61   CALCIUM 8.9 8.8     Recent Labs     10/21/22  0426   INR 1.11               Imaging  CT-NEEDLE BX-DEEP BONE   Final Result      1.  CT GUIDED Lumbar DEEP BONE BIOPSY OF THE L4-5 DISC SPACE AND ENDPLATES. SPECIMENS SUBMITTED IN SALINE FOR CULTURE AND SENSITIVITY, GRAM STAIN, AFB, AND FUNGAL CULTURES.      2. CT-GUIDED RETROPERITONEAL BIOPSY OF LEFT-SIDED PARAVERTEBRAL PHLEGMON BORDERING THE LEFT PSOAS MUSCLE AS SEEN ON MRI. SOFT TISSUE CORE BIOPSIES WERE SUBMITTED IN SALINE TO BE MACERATED FOR CULTURES.      MR-LUMBAR SPINE-WITH & W/O   Final Result      1.  L4-5 discitis.   2.  L4 and L5 osteomyelitis.   3.  Epidural and prevertebral phlegmon at the level of L4-5.   4.  Moderate central canal stenosis at L4-5.   5.  Severe bilateral L4 and L5 degenerative neural foraminal stenosis.   6.  There is no well-defined fluid collection.      DX-LUMBAR  "SPINE-2 OR 3 VIEWS   Final Result      1.  Acute discitis/osteomyelitis at the L4-5 level. Recommend MRI scan of the lumbar spine with gadolinium enhancement for further evaluation.      2.  These findings were discussed with AKASH JOSE at 9:20 PM 10/19/2022      IR-PICC LINE PLACEMENT W/ GUIDANCE > AGE 5    (Results Pending)          Assessment/Plan  * Osteomyelitis of vertebra, epidural abscess (HCC)- (present on admission)  Assessment & Plan  lumbar x-ray showed acute discitis osteomyelitis at the L4-5 level.   MRI  shows L4-5 discitis/osteomyelitis and Prevertebral phlegmon    Neurosurgery consulted.  S/p L4-L5 disc biopsy and left paravertebral phlegmon 10/21    Wound culture positive for MRSA    ID following, continue IV vancomycin; plan for 6 weeks of IV antibiotics with end date 12/2    Plan for PICC line placement if her blood culture stays negative for 48 hours      Constipation  Assessment & Plan  No bowel movement for 5 days, continue bowel management, suppository as needed    Paravertebral phlegmon- (present on admission)  Assessment & Plan  Read on MRI     S/p L4-L5 disc biopsy and left paravertebral phlegmon 10/21  Continue vancomycin per ID      Elevated blood-pressure reading without diagnosis of hypertension- (present on admission)  Assessment & Plan  Likely elevated secondary to pain and anxiety with new hospital admission.  IV antihypertensives ordered with parameters, if remains persistently elevated would benefit from initiating p.o. regimen.    Intractable back pain- (present on admission)  Assessment & Plan  Found to have L4-5 discitis/osteomyelitis with ventral epidural abscess on preliminary read.  Pain control.  Follow neurosurgery recommendations.    Anemia- (present on admission)  Assessment & Plan  No reports of hemorrhage, continue to monitor.  Transfuse hemoglobin less than 7\"    Recent Labs     10/19/22  2051 10/20/22  0155 10/21/22  0426   HEMOGLOBIN 11.7* 11.9* 11.6* "   HEMATOCRIT 35.8* 36.7* 35.1*   MCV 90.4 90.2 89.8   MCH 29.5 29.2 29.7   PLATELETCT 402 370 449*     No active bleeding       VTE prophylaxis: SCDs/TEDs    I have performed a physical exam and reviewed and updated ROS and Plan today (10/23/2022). In review of yesterday's note (10/22/2022), there are no changes except as documented above.

## 2022-10-23 NOTE — PROGRESS NOTES
Infectious Disease Progress Note    Author: Isabel Moreno M.D. Date & Time of service: 10/23/2022  8:04 AM    Chief Complaint:  Follow-up for vertebral osteomyelitis/discitis    Interval History:  10/22 afebrile, patient underwent disc base biopsy of L4-5 and CT guided biopsy of paravertebral soft tissue phlegmon yesterday.  Cultures pending.  Patient sleeping and not arousable to voice  10/23 afebrile WBC 5.5.  Cultures are growing MRSA.  Repeat blood cultures on 10/21 are negative to date.  Patient had some leg cramping overnight.  Plan of care regarding PICC line and IV antibiotics discussed with patient in detail today.  Questions answered.    Labs Reviewed and Medications Reviewed.    Review of Systems:  Review of Systems   Constitutional:  Negative for chills and fever.   Gastrointestinal:  Negative for abdominal pain, diarrhea, nausea and vomiting.   Musculoskeletal:  Positive for back pain and myalgias.   Neurological:  Negative for dizziness and headaches.         Hemodynamics:  Temp (24hrs), Av.6 °C (97.9 °F), Min:36.1 °C (97 °F), Max:37.4 °C (99.4 °F)  Temperature: 36.1 °C (97 °F)  Pulse  Av  Min: 79  Max: 104   Blood Pressure: (!) 137/93       Physical Exam:  Physical Exam  Vitals and nursing note reviewed.   Constitutional:       Appearance: Normal appearance.   HENT:      Mouth/Throat:      Mouth: Mucous membranes are moist.   Eyes:      Extraocular Movements: Extraocular movements intact.      Pupils: Pupils are equal, round, and reactive to light.   Cardiovascular:      Rate and Rhythm: Normal rate.   Pulmonary:      Effort: Pulmonary effort is normal.   Abdominal:      Palpations: Abdomen is soft.   Skin:     General: Skin is warm and dry.   Neurological:      General: No focal deficit present.      Mental Status: He is alert and oriented to person, place, and time.   Psychiatric:         Mood and Affect: Mood normal.         Behavior: Behavior normal.       Meds:    Current  Facility-Administered Medications:     lidocaine    gabapentin    vancomycin    cefTRIAXone (ROCEPHIN) IV    MD Alert...Vancomycin per Pharmacy    simethicone    calcium carbonate    senna-docusate **AND** polyethylene glycol/lytes **AND** magnesium hydroxide **AND** bisacodyl    acetaminophen    labetalol    ondansetron    ondansetron    promethazine    promethazine    prochlorperazine    HYDROcodone-acetaminophen    HYDROmorphone    Labs:  Recent Labs     10/21/22  0426 10/23/22  0314   WBC 9.1 5.5   RBC 3.91* 4.14*   HEMOGLOBIN 11.6* 12.0*   HEMATOCRIT 35.1* 36.7*   MCV 89.8 88.6   MCH 29.7 29.0   RDW 45.1 44.0   PLATELETCT 449* 458*   MPV 8.2* 8.0*       Recent Labs     10/21/22  0426 10/23/22  0314   SODIUM 133* 136   POTASSIUM 4.1 4.1   CHLORIDE 97 100   CO2 23 25   GLUCOSE 89 104*   BUN 11 11       Recent Labs     10/21/22  0426 10/23/22  0314   ALBUMIN 3.3 3.4   CREATININE 0.60 0.61         Imaging:  CT-NEEDLE BX-DEEP BONE    Result Date: 10/21/2022  10/21/2022 5:02 PM HISTORY/REASON FOR EXAM:  L4-5 discitis, osteomyelitis, ventral epidural abscess, paravertebral phlegmon along the medial aspects of the psoas muscles.  No devon psoas abscess. Blood cultures inconclusive, gram-negative chico thought to be a contaminant. TECHNIQUE/EXAM DESCRIPTION: 1.  Lumbar deep bone biopsy (L4-5 disc and endplate bone biopsy) with CT guidance. 2.  CT-guided retroperitoneal biopsy left paravertebral phlegmon along the medial aspect of the left psoas muscle Low dose optimization technique was utilized for this CT exam including automated exposure control and adjustment of the mA and/or kV according to patient size. ALL ELEMENTS OF MAXIMAL STERILE BARRIER TECHNIQUE WERE FOLLOWED. PROCEDURE: Informed consent was obtained. Moderate sedation was provided. Pulse oximetry and continuous cardiac monitoring by the nurse was performed throughout the exam. Intraservice time was 30 minutes. Localizing CT images were obtained with the  patient in prone position. The skin was prepped with ChloraPrep and draped in a sterile fashion. Following local anesthesia with 1% Lidocaine, a 13 G Bingham bone biopsy needle was placed and needle position confirmed with CT. 14-gauge trephine bone biopsy cores were obtained x2 from the L4-5 disc disc space and endplates. Next, using coaxial technique, an 18 G quick core biopsy needle was placed into the L4-5 disc space and needle position confirmed with CT. 18-gauge core biopsies were obtained x2. These 4 specimens were also submitted in saline for culture and sensitivity, Gram stain, AFB, and fungal cultures. Next, retroperitoneal biopsy was performed. Following local anesthesia with 3 mL 1% lidocaine, a 17-gauge guiding needle was advanced from a left paraspinous approach into the left paravertebral soft tissues between the medial aspect of the left psoas muscle and vertebral body margin. Using coaxial technique, 18-gauge core biopsies x2 were obtained. These specimens were submitted in a separate test tube with saline and also submitted for culture and sensitivity, Gram stain, AFB, and fungal culture. The guiding needles were removed and limited CT images obtained through the biopsy site show no evidence of significant hemorrhage. The patient tolerated the procedure well. COMPARISON: MRI lumbar spine 10/19/2022 FINDINGS: The localizing CT images show satisfactory needle position within the target lesion. In the left paravertebral soft tissues for the phlegmon biopsy.     1.  CT GUIDED Lumbar DEEP BONE BIOPSY OF THE L4-5 DISC SPACE AND ENDPLATES. SPECIMENS SUBMITTED IN SALINE FOR CULTURE AND SENSITIVITY, GRAM STAIN, AFB, AND FUNGAL CULTURES. 2. CT-GUIDED RETROPERITONEAL BIOPSY OF LEFT-SIDED PARAVERTEBRAL PHLEGMON BORDERING THE LEFT PSOAS MUSCLE AS SEEN ON MRI. SOFT TISSUE CORE BIOPSIES WERE SUBMITTED IN SALINE TO BE MACERATED FOR CULTURES.    DX-LUMBAR SPINE-2 OR 3 VIEWS    Result Date: 10/19/2022  10/19/2022  9:10 PM HISTORY/REASON FOR EXAM:  Severe low back pain x2 months. TECHNIQUE/ EXAM DESCRIPTION AND NUMBER OF VIEWS:  2 views of the lumbar spine. COMPARISON: None. FINDINGS: There are acute erosive changes involving the inferior endplate of the L4 vertebral body and superior endplate of the L5 vertebral body. There is also irregularity of the disc space at the L4-5 level. The bony trabecular pattern is normal.  The lumbar vertebral bodies have a normal height and alignment.   There is no evidence of spondylolisthesis or osseous lesion.  The posterior elements appear grossly normal on the limited series.     1.  Acute discitis/osteomyelitis at the L4-5 level. Recommend MRI scan of the lumbar spine with gadolinium enhancement for further evaluation. 2.  These findings were discussed with AKASH JOSE at 9:20 PM 10/19/2022    MR-LUMBAR SPINE-WITH & W/O    Result Date: 10/20/2022  10/19/2022 10:47 PM HISTORY/REASON FOR EXAM:  Severe back pain. TECHNIQUE/EXAM DESCRIPTION: MRI of the lumbar spine without and with contrast. The study was performed on a [employer] Signa 1.5 Taina MRI scanner. T1 sagittal, T2 fast spin-echo sagittal, and T2 axial images were obtained of the lumbar spine. T1 post-contrast fat-suppressed sagittal images were obtained. 10 mL ProHance contrast was administered intravenously. COMPARISON:  None. FINDINGS: There is destruction of L4-5 disc space with fluid collection. There are edema and contrast enhancement noted involving L4 and L5 vertebral bodies. There is epidural and prevertebral phlegmon noted surrounding the L4-5 disc space. There is no well-defined abscess. There is moderate central canal stenosis at this level. There is severe bilateral L5 neural foraminal stenosis. At the level of L5-S1,there is disc degeneration. Bilateral facet joint arthropathy is seen. There is severe bilateral neural foraminal stenosis. At the level of L3-4,there is no spinal or neural foraminal stenosis. At the level  of L2-3,there is no spinal or neural foraminal stenosis. At the level of L1-2,there is no spinal or neural foraminal stenosis. The conus terminates at the level of L1 the visualized lower thoracic spinal cord is unremarkable. There is no lumbar intradural lesion. There is an approximately 2.7 cm a cyst in the right kidney.     1.  L4-5 discitis. 2.  L4 and L5 osteomyelitis. 3.  Epidural and prevertebral phlegmon at the level of L4-5. 4.  Moderate central canal stenosis at L4-5. 5.  Severe bilateral L4 and L5 degenerative neural foraminal stenosis. 6.  There is no well-defined fluid collection.      Micro:  Results       Procedure Component Value Units Date/Time    GRAM STAIN [[id number]] Collected: 10/21/22 1730    Order Status: Completed Specimen: Tissue Updated: 10/22/22 1911     Significant Indicator .     Source TISS     Site Perispinous phlegmon     Gram Stain Result No organisms seen.    Fungal Smear [[id number]] Collected: 10/21/22 1730    Order Status: Completed Specimen: Tissue Updated: 10/22/22 1911     Significant Indicator NEG     Source TISS     Site Perispinous phlegmon     Fungal Smear Results No fungal elements seen.    Acid Fast Stain [[id number]] Collected: 10/21/22 1730    Order Status: Completed Specimen: Tissue Updated: 10/22/22 1911     Significant Indicator NEG     Source TISS     Site Perispinous phlegmon     AFB Smear Results No acid fast bacilli seen.    GRAM STAIN [[id number]] Collected: 10/21/22 1730    Order Status: Completed Specimen: Tissue Updated: 10/22/22 1911     Significant Indicator .     Source TISS     Site L4-L5     Gram Stain Result No organisms seen.    Fungal Smear [[id number]] Collected: 10/21/22 1730    Order Status: Completed Specimen: Tissue Updated: 10/22/22 1911     Significant Indicator NEG     Source TISS     Site L4-L5     Fungal Smear Results No fungal elements seen.    Acid Fast Stain [[id number]] Collected: 10/21/22 1730    Order Status: Completed Specimen: Tissue  Updated: 10/22/22 1911     Significant Indicator NEG     Source TISS     Site L4-L5     AFB Smear Results No acid fast bacilli seen.    AFB Culture [[id number]] Collected: 10/21/22 1730    Order Status: Completed Specimen: Tissue Updated: 10/22/22 1910     Significant Indicator NEG     Source TISS     Site Perispinous phlegmon     Culture Result Culture in progress.     AFB Smear Results No acid fast bacilli seen.    CULTURE TISSUE W/ GRM STAIN [[id number]]  (Abnormal) Collected: 10/21/22 1730    Order Status: Completed Specimen: Tissue Updated: 10/22/22 1910     Significant Indicator POS     Source TISS     Site Perispinous phlegmon     Culture Result -     Gram Stain Result No organisms seen.     Culture Result Staphylococcus aureus  Light growth  Methicillin Resistant via screening method.      Fungal Culture [[id number]] Collected: 10/21/22 1730    Order Status: Completed Specimen: Tissue Updated: 10/22/22 1910     Significant Indicator NEG     Source TISS     Site Perispinous phlegmon     Culture Result Culture in progress.     Fungal Smear Results No fungal elements seen.    CULTURE TISSUE W/ GRM STAIN [[id number]]  (Abnormal) Collected: 10/21/22 1730    Order Status: Completed Specimen: Tissue Updated: 10/22/22 1910     Significant Indicator POS     Source TISS     Site L4-L5     Culture Result -     Gram Stain Result No organisms seen.     Culture Result Staphylococcus aureus  Light growth  Methicillin Resistant via screening method.      Fungal Culture [[id number]] Collected: 10/21/22 1730    Order Status: Completed Specimen: Tissue Updated: 10/22/22 1910     Significant Indicator NEG     Source TISS     Site L4-L5     Culture Result Culture in progress.     Fungal Smear Results No fungal elements seen.    AFB Culture [[id number]] Collected: 10/21/22 1730    Order Status: Completed Specimen: Tissue Updated: 10/22/22 1910     Significant Indicator NEG     Source TISS     Site L4-L5     Culture Result Culture in  "progress.     AFB Smear Results No acid fast bacilli seen.    BLOOD CULTURE [[id number]] Collected: 10/21/22 1251    Order Status: Completed Specimen: Blood from Peripheral Updated: 10/22/22 0752     Significant Indicator NEG     Source BLD     Site PERIPHERAL     Culture Result No Growth  Note: Blood cultures are incubated for 5 days and  are monitored continuously.Positive blood cultures  are called to the RN and reported as soon as  they are identified.      Narrative:      Per Hospital Policy: Only change Specimen Src: to \"Line\" if  specified by physician order.  Right Hand    BLOOD CULTURE [[id number]] Collected: 10/21/22 1251    Order Status: Completed Specimen: Blood from Peripheral Updated: 10/22/22 0752     Significant Indicator NEG     Source BLD     Site PERIPHERAL     Culture Result No Growth  Note: Blood cultures are incubated for 5 days and  are monitored continuously.Positive blood cultures  are called to the RN and reported as soon as  they are identified.      Narrative:      Per Hospital Policy: Only change Specimen Src: to \"Line\" if  specified by physician order.  Left AC    FLUID CULTURE W/GRAM STAIN [[id number]]     Order Status: No result Specimen: Other Body Fluid     Fungal Culture [[id number]]     Order Status: No result Specimen: Other Body Fluid     AFB Culture [[id number]]     Order Status: No result Specimen: Body Fluid from Abscess     FLUID CULTURE W/GRAM STAIN [[id number]]     Order Status: No result Specimen: Bone from Other Body Fluid     Fungal Culture [[id number]]     Order Status: No result Specimen: Bone     AFB Culture [[id number]]     Order Status: No result Specimen: Bone     Fungal Culture [[id number]]     Order Status: No result Specimen: Respirate from Bone     MRSA By PCR (Amp) [[id number]] Collected: 10/21/22 1242    Order Status: Completed Specimen: Respirate from Nares Updated: 10/21/22 1708     MRSA by PCR Negative    Narrative:      Collected By: [id number] ISACC GRAY" "Blood Culture [[id number]]  (Abnormal) Collected: 10/19/22 2157    Order Status: Completed Specimen: Blood from Peripheral Updated: 10/21/22 1021     Significant Indicator POS     Source BLD     Site Peripheral     Culture Result Growth detected by Bactec instrument. 10/20/2022  23:03      Bacillus species  Possible Contaminant  Isolated from one set only, please correlate with clinical  condition. Contact the Microbiology department within 48 hr  if identification and susceptibility are needed.      Narrative:      CALL  [facility]  61 tel. [phone],  CALLED  61 tel. [phone] 10/20/2022, 23:06, RB PERF. RESULTS CALLED  TO:[id number]  1 of 2 for Blood Culture x 2 sites order. Per Hospital  Policy: Only change Specimen Src: to \"Line\" if specified by  physician order.  No site indicated    Blood Culture [[id number]] Collected: 10/19/22 2239    Order Status: Completed Specimen: Blood from Peripheral Updated: 10/21/22 0812     Significant Indicator NEG     Source BLD     Site PERIPHERAL     Culture Result No Growth  Note: Blood cultures are incubated for 5 days and  are monitored continuously.Positive blood cultures  are called to the RN and reported as soon as  they are identified.      Narrative:      2 of 2 blood culture x2  Sites order. Per Hospital Policy:  Only change Specimen Src: to \"Line\" if specified by physician  order.  Right Hand            Assessment:  Active Hospital Problems    Diagnosis     *Osteomyelitis of vertebra, epidural abscess (HCC) [M46.20]     Epidural abscess [G06.2]     Anemia [D64.9]     Intractable back pain [M54.9]     Elevated blood-pressure reading without diagnosis of hypertension [R03.0]       52 y.o. man with no prior medical history admitted on 10/19/2022 secondary to persistent and worsening lower back pain since August without any inciting events, trauma or injury.  MRI of the lumbar spine shows L4-L5 discitis, osteomyelitis and epidural and prevertebral phlegmon with moderate central " canal stenosis.  But no well-defined fluid collection.  He was evaluated by neurosurgery who did not feel he was a surgical candidate given normal physical exam.  Patient is status post biopsy on 10/21    Pertinent diagnoses:  Lumbar osteomyelitis/discitis  MRSA infection  Prevertebral phlegmon  Positive blood culture, likely contaminant    Plan:  -Discontinue IV ceftriaxone  -Continue IV vancomycin   -Monitor renal function and Vanco trough  -Last Vanco trough 5.6 on 10/22-subtherapeutic  - Status post biopsy of L4-L5 disc space and left paravertebral phlegmon on 10/21  -Follow cultures-MRSA.  Follow susceptibilities  -Repeat blood cultures ordered x2 on 10/21-negative to date  - PICC line ordered today  -Will plan a 6-week course of IV antibiotics from 10/21 with stop date of 12/2/2022  -At discharge, can do daptomycin 8 mg/kg daily to complete antibiotic course above  -Home IV antibiotic orders for IV daptomycin 8 mg/kg daily done today and faxed to social work office  -Outpatient repeat MRI of the lumbar spine ordered-to be completed prior to completion of IV antibiotic course    Disposition: Home IV antibiotics    Need for PICC line: Yes    Follow-up in the ID clinic    Plan of care discussed with hospitalist, Dr. Fernandez.  ID signing off

## 2022-10-24 ENCOUNTER — APPOINTMENT (OUTPATIENT)
Dept: RADIOLOGY | Facility: MEDICAL CENTER | Age: 52
DRG: 477 | End: 2022-10-24
Attending: INTERNAL MEDICINE
Payer: COMMERCIAL

## 2022-10-24 PROCEDURE — 97162 PT EVAL MOD COMPLEX 30 MIN: CPT

## 2022-10-24 PROCEDURE — 700102 HCHG RX REV CODE 250 W/ 637 OVERRIDE(OP): Performed by: STUDENT IN AN ORGANIZED HEALTH CARE EDUCATION/TRAINING PROGRAM

## 2022-10-24 PROCEDURE — 02HV33Z INSERTION OF INFUSION DEVICE INTO SUPERIOR VENA CAVA, PERCUTANEOUS APPROACH: ICD-10-PCS | Performed by: INTERNAL MEDICINE

## 2022-10-24 PROCEDURE — 770001 HCHG ROOM/CARE - MED/SURG/GYN PRIV*

## 2022-10-24 PROCEDURE — A9270 NON-COVERED ITEM OR SERVICE: HCPCS | Performed by: STUDENT IN AN ORGANIZED HEALTH CARE EDUCATION/TRAINING PROGRAM

## 2022-10-24 PROCEDURE — 99233 SBSQ HOSP IP/OBS HIGH 50: CPT | Performed by: STUDENT IN AN ORGANIZED HEALTH CARE EDUCATION/TRAINING PROGRAM

## 2022-10-24 PROCEDURE — 700111 HCHG RX REV CODE 636 W/ 250 OVERRIDE (IP)

## 2022-10-24 PROCEDURE — 700105 HCHG RX REV CODE 258: Performed by: INTERNAL MEDICINE

## 2022-10-24 PROCEDURE — C1751 CATH, INF, PER/CENT/MIDLINE: HCPCS

## 2022-10-24 PROCEDURE — 700101 HCHG RX REV CODE 250

## 2022-10-24 PROCEDURE — 700102 HCHG RX REV CODE 250 W/ 637 OVERRIDE(OP)

## 2022-10-24 PROCEDURE — 700111 HCHG RX REV CODE 636 W/ 250 OVERRIDE (IP): Performed by: STUDENT IN AN ORGANIZED HEALTH CARE EDUCATION/TRAINING PROGRAM

## 2022-10-24 PROCEDURE — 700105 HCHG RX REV CODE 258: Performed by: STUDENT IN AN ORGANIZED HEALTH CARE EDUCATION/TRAINING PROGRAM

## 2022-10-24 PROCEDURE — A9270 NON-COVERED ITEM OR SERVICE: HCPCS

## 2022-10-24 PROCEDURE — 700111 HCHG RX REV CODE 636 W/ 250 OVERRIDE (IP): Performed by: INTERNAL MEDICINE

## 2022-10-24 RX ORDER — KETOROLAC TROMETHAMINE 30 MG/ML
30 INJECTION, SOLUTION INTRAMUSCULAR; INTRAVENOUS ONCE
Status: COMPLETED | OUTPATIENT
Start: 2022-10-24 | End: 2022-10-24

## 2022-10-24 RX ADMIN — GABAPENTIN 300 MG: 300 CAPSULE ORAL at 05:01

## 2022-10-24 RX ADMIN — HYDROCODONE BITARTRATE AND ACETAMINOPHEN 2 TABLET: 5; 325 TABLET ORAL at 15:34

## 2022-10-24 RX ADMIN — HYDROMORPHONE HYDROCHLORIDE 1 MG: 1 INJECTION, SOLUTION INTRAMUSCULAR; INTRAVENOUS; SUBCUTANEOUS at 04:30

## 2022-10-24 RX ADMIN — HYDROCODONE BITARTRATE AND ACETAMINOPHEN 2 TABLET: 5; 325 TABLET ORAL at 04:45

## 2022-10-24 RX ADMIN — LIDOCAINE 1 PATCH: 50 PATCH TOPICAL at 04:30

## 2022-10-24 RX ADMIN — GABAPENTIN 300 MG: 300 CAPSULE ORAL at 17:49

## 2022-10-24 RX ADMIN — HYDROCODONE BITARTRATE AND ACETAMINOPHEN 2 TABLET: 5; 325 TABLET ORAL at 20:50

## 2022-10-24 RX ADMIN — VANCOMYCIN HYDROCHLORIDE 1000 MG: 500 INJECTION, POWDER, LYOPHILIZED, FOR SOLUTION INTRAVENOUS at 17:50

## 2022-10-24 RX ADMIN — CYCLOBENZAPRINE 5 MG: 10 TABLET, FILM COATED ORAL at 13:01

## 2022-10-24 RX ADMIN — VANCOMYCIN HYDROCHLORIDE 1000 MG: 500 INJECTION, POWDER, LYOPHILIZED, FOR SOLUTION INTRAVENOUS at 09:36

## 2022-10-24 RX ADMIN — CYCLOBENZAPRINE 5 MG: 10 TABLET, FILM COATED ORAL at 05:01

## 2022-10-24 RX ADMIN — VANCOMYCIN HYDROCHLORIDE 1000 MG: 500 INJECTION, POWDER, LYOPHILIZED, FOR SOLUTION INTRAVENOUS at 00:56

## 2022-10-24 RX ADMIN — HYDROCODONE BITARTRATE AND ACETAMINOPHEN 2 TABLET: 5; 325 TABLET ORAL at 09:37

## 2022-10-24 RX ADMIN — KETOROLAC TROMETHAMINE 30 MG: 30 INJECTION, SOLUTION INTRAMUSCULAR at 05:14

## 2022-10-24 RX ADMIN — GABAPENTIN 300 MG: 300 CAPSULE ORAL at 13:01

## 2022-10-24 ASSESSMENT — ENCOUNTER SYMPTOMS
SEIZURES: 0
LOSS OF CONSCIOUSNESS: 0
COUGH: 0
BLOOD IN STOOL: 0
HEADACHES: 0
DIARRHEA: 0
FOCAL WEAKNESS: 0
BACK PAIN: 1
TREMORS: 0
SHORTNESS OF BREATH: 0
FALLS: 0
EYE REDNESS: 0
PALPITATIONS: 0
EYE PAIN: 0
MYALGIAS: 0
CHILLS: 0
WEAKNESS: 0
INSOMNIA: 0
DIZZINESS: 0
FEVER: 0
NERVOUS/ANXIOUS: 0
WHEEZING: 0
ABDOMINAL PAIN: 0
CONSTIPATION: 0
NAUSEA: 0
HEMOPTYSIS: 0
VOMITING: 0

## 2022-10-24 ASSESSMENT — PAIN DESCRIPTION - PAIN TYPE
TYPE: ACUTE PAIN

## 2022-10-24 ASSESSMENT — COGNITIVE AND FUNCTIONAL STATUS - GENERAL
WALKING IN HOSPITAL ROOM: A LITTLE
MOVING TO AND FROM BED TO CHAIR: A LITTLE
MOBILITY SCORE: 18
MOVING FROM LYING ON BACK TO SITTING ON SIDE OF FLAT BED: A LITTLE
TURNING FROM BACK TO SIDE WHILE IN FLAT BAD: A LITTLE
SUGGESTED CMS G CODE MODIFIER MOBILITY: CK
STANDING UP FROM CHAIR USING ARMS: A LITTLE
CLIMB 3 TO 5 STEPS WITH RAILING: A LITTLE

## 2022-10-24 NOTE — PROCEDURES
Vascular Access Team     Date of Insertion: 10/24/22  Arm Circumference: 26.5  Internal length: 43  External Length: 0  Vein Occupancy %: 32   Reason for PICC: Antibiotic Therapy  Labs: WBC 5.5, , BUN 11, Cr 0.61, , INR N/A     Consents confirmed, vessel patency confirmed with ultrasound. Risks and benefits of procedure explained to patient and education regarding central line associated bloodstream infections provided. Questions answered.      PICC placed in RUE per licensed provider order with ultrasound guidance.  4 Fr, single lumen PICC placed in basilic vein after 1 attempt(s). 2 mL of 1% lidocaine injected intradermally at the insertion site. A 21 gauge microintroducer needle was visualized entering the vein and modified Seldinger technique was used to obtain access to the vein. 43 cm catheter inserted and brisk blood return was observed from each lumen upon aspiration. Line secured at the 0 cm marker. TCS stylet removed and observed to be fully intact. Each lumen flushed using pulsatile method without resistance with 10 mL 0.9% normal saline. PICC line secured with Biopatch and Tegaderm.     PICC tip placement location is confirmed by nurse to be in the Superior Vena Cava (SVC) utilizing 3CG technology. PICC line is appropriate for use at this time. Patient tolerated procedure well, without complications.  Patient condition relayed to primary RN or ordering physician via this post procedure note in the EMR.      Ultrasound images uploaded to PACS and viewable in the EMR - yes  Ultrasound imaged printed and placed in paper chart - no     BARD Power PICC ref # [id number], Lot # [id number], Expiration Date 3/31/23

## 2022-10-24 NOTE — PROGRESS NOTES
Hospital Medicine Daily Progress Note    Date of Service  10/24/2022    Chief Complaint  Yonis Ward is a 52 y.o. male admitted 10/19/2022 with Back Pain (Severe lower back pain that has steadily gotten worse for the last two months. Denies injury, numbness/tingling. Occasional weakness down left leg. PIV placed with EMS, pt received 100mcg fentanyl and 1mg versed )      Hospital Course    Yonis Ward is a 52 y.o. male without any chronic medical conditions not on any long-term medications who presented 10/19/2022 with severe low back pain.  Patient first began experiencing back pain in mid August 2022.  No trauma or inciting events,   His back pain is waxed and waned over the past 2 months from mild to severe.  He has no weakness or numbness or tingling, no saddle anesthesia, no bowel or bladder incontinence.      lumbar x-ray showed acute discitis osteomyelitis at the L4-5 level. MRI  shows L4-5 discitis/osteomyelitis and Prevertebral phlegmon    S/p L4-L5 disc biopsy had a bowel movement today had a bowel movement and left paravertebral phlegmon 10/21      Interval Problem Update    Reported L LE pain, worsened with movement, shooting pain and numbness while standing.  Back pain better  Ordered MRI lumbar with and without contrast    Had a bowel movement today and yesterday.  Constipation resolved    Wound culture positive for MRSA    S/p L4-L5 disc biopsy and left paravertebral phlegmon 10/21  Culture pending  Blood culture negative to date    ID following, continue IV vancomycin; plan for 6 weeks of IV antibiotics with end date 12/2    Plan for PICC line placement today    patient will stay with his parents,   Home infusion arrangement per CM        I have discussed this patient's plan of care and discharge plan at IDT rounds today with Case Management, Nursing, Nursing leadership, and other members of the IDT team.    Consultants/Specialty  Neurosurgery    Code Status  Full  Code    Disposition  Patient is not medically cleared for discharge.   Anticipate discharge to  TBD .  I have placed the appropriate orders for post-discharge needs.    Review of Systems  Review of Systems   Constitutional:  Negative for chills and fever.   HENT:  Negative for congestion, hearing loss and nosebleeds.    Eyes:  Negative for pain and redness.   Respiratory:  Negative for cough, hemoptysis, shortness of breath and wheezing.    Cardiovascular:  Negative for chest pain and palpitations.   Gastrointestinal:  Negative for abdominal pain, blood in stool, constipation, diarrhea, nausea and vomiting.   Genitourinary:  Negative for dysuria, frequency and hematuria.   Musculoskeletal:  Positive for back pain. Negative for falls, joint pain and myalgias.   Skin:  Negative for rash.   Neurological:  Negative for dizziness, tremors, focal weakness, seizures, loss of consciousness, weakness and headaches.   Psychiatric/Behavioral:  The patient is not nervous/anxious and does not have insomnia.    All other systems reviewed and are negative.     Physical Exam  Temp:  [36.6 °C (97.8 °F)-36.8 °C (98.3 °F)] 36.8 °C (98.3 °F)  Pulse:  [82-91] 84  Resp:  [17-20] 17  BP: (136-156)/() 136/100  SpO2:  [96 %-97 %] 97 %    Physical Exam  Vitals and nursing note reviewed.   Constitutional:       Comments: Thin   HENT:      Head: Normocephalic and atraumatic.      Right Ear: External ear normal.      Left Ear: External ear normal.      Nose: Nose normal.      Mouth/Throat:      Mouth: Mucous membranes are moist.   Eyes:      General: No scleral icterus.     Conjunctiva/sclera: Conjunctivae normal.   Cardiovascular:      Rate and Rhythm: Normal rate and regular rhythm.      Heart sounds: No murmur heard.    No friction rub. No gallop.   Pulmonary:      Effort: Pulmonary effort is normal.      Breath sounds: Normal breath sounds.   Abdominal:      General: Abdomen is flat. Bowel sounds are normal. There is no distension.       Palpations: Abdomen is soft.      Tenderness: There is no abdominal tenderness. There is no guarding.   Musculoskeletal:         General: Tenderness (low back, moderate intensity unchanged) present. Normal range of motion.      Cervical back: Normal range of motion and neck supple.   Skin:     General: Skin is warm.   Neurological:      Mental Status: He is alert and oriented to person, place, and time. Mental status is at baseline.   Psychiatric:         Mood and Affect: Mood normal.         Behavior: Behavior normal.         Thought Content: Thought content normal.         Judgment: Judgment normal.       Fluids    Intake/Output Summary (Last 24 hours) at 10/24/2022 1602  Last data filed at 10/24/2022 1243  Gross per 24 hour   Intake 1140 ml   Output 1950 ml   Net -810 ml       Laboratory  Recent Labs     10/23/22  0314   WBC 5.5   RBC 4.14*   HEMOGLOBIN 12.0*   HEMATOCRIT 36.7*   MCV 88.6   MCH 29.0   MCHC 32.7*   RDW 44.0   PLATELETCT 458*   MPV 8.0*     Recent Labs     10/23/22  0314   SODIUM 136   POTASSIUM 4.1   CHLORIDE 100   CO2 25   GLUCOSE 104*   BUN 11   CREATININE 0.61   CALCIUM 8.8                     Imaging  IR-PICC LINE PLACEMENT W/ GUIDANCE > AGE 5   Final Result                  Ultrasound-guided PICC placement performed by qualified nursing staff as    above.          CT-NEEDLE BX-DEEP BONE   Final Result      1.  CT GUIDED Lumbar DEEP BONE BIOPSY OF THE L4-5 DISC SPACE AND ENDPLATES. SPECIMENS SUBMITTED IN SALINE FOR CULTURE AND SENSITIVITY, GRAM STAIN, AFB, AND FUNGAL CULTURES.      2. CT-GUIDED RETROPERITONEAL BIOPSY OF LEFT-SIDED PARAVERTEBRAL PHLEGMON BORDERING THE LEFT PSOAS MUSCLE AS SEEN ON MRI. SOFT TISSUE CORE BIOPSIES WERE SUBMITTED IN SALINE TO BE MACERATED FOR CULTURES.      MR-LUMBAR SPINE-WITH & W/O   Final Result      1.  L4-5 discitis.   2.  L4 and L5 osteomyelitis.   3.  Epidural and prevertebral phlegmon at the level of L4-5.   4.  Moderate central canal stenosis at L4-5.    5.  Severe bilateral L4 and L5 degenerative neural foraminal stenosis.   6.  There is no well-defined fluid collection.      DX-LUMBAR SPINE-2 OR 3 VIEWS   Final Result      1.  Acute discitis/osteomyelitis at the L4-5 level. Recommend MRI scan of the lumbar spine with gadolinium enhancement for further evaluation.      2.  These findings were discussed with AKASH JOSE at 9:20 PM 10/19/2022      MR-LUMBAR SPINE-WITH & W/O    (Results Pending)          Assessment/Plan  * Osteomyelitis of vertebra, epidural abscess (HCC)- (present on admission)  Assessment & Plan  lumbar x-ray showed acute discitis osteomyelitis at the L4-5 level.   MRI  shows L4-5 discitis/osteomyelitis and Prevertebral phlegmon    Neurosurgery consulted.  S/p L4-L5 disc biopsy and left paravertebral phlegmon 10/21    Wound culture positive for MRSA    ID following, continue IV vancomycin; plan for 6 weeks of IV antibiotics with end date 12/2    Plan for PICC line placement today    patient will stay with his parents,   Home infusion arrangement per CM      Constipation  Assessment & Plan  No bowel movement for 5 days, continue bowel management, suppository as needed    10/24 resolved    Paravertebral phlegmon- (present on admission)  Assessment & Plan  Read on MRI     S/p L4-L5 disc biopsy and left paravertebral phlegmon 10/21  Continue vancomycin per ID      Elevated blood-pressure reading without diagnosis of hypertension- (present on admission)  Assessment & Plan  Likely elevated secondary to pain and anxiety with new hospital admission.  IV antihypertensives ordered with parameters, if remains persistently elevated would benefit from initiating p.o. regimen.    Intractable back pain- (present on admission)  Assessment & Plan  Found to have L4-5 discitis/osteomyelitis with ventral epidural abscess on preliminary read.  Pain control.  neurosurgery recommendations.    10/24 Reported L LE pain, worsened with movement, shooting pain and  "numbness while standing.  Back pain better  Ordered MRI lumbar with and without contrast        Anemia- (present on admission)  Assessment & Plan  No reports of hemorrhage, continue to monitor.  Transfuse hemoglobin less than 7\"    Recent Labs     10/19/22  2051 10/20/22  0155 10/21/22  0426   HEMOGLOBIN 11.7* 11.9* 11.6*   HEMATOCRIT 35.8* 36.7* 35.1*   MCV 90.4 90.2 89.8   MCH 29.5 29.2 29.7   PLATELETCT 402 370 449*     No active bleeding       VTE prophylaxis: SCDs/TEDs    I have performed a physical exam and reviewed and updated ROS and Plan today (10/24/2022). In review of yesterday's note (10/23/2022), there are no changes except as documented above.        "

## 2022-10-24 NOTE — DISCHARGE PLANNING
Case Management Discharge Planning    Admission Date: 10/19/2022  GMLOS: 6.2  ALOS: 5    6-Clicks ADL Score: 20  6-Clicks Mobility Score: 13  PT and/or OT Eval ordered: NA  Post-acute Referrals Ordered: Yes  Post-acute Choice Obtained: Yes  Has referral(s) been sent to post-acute provider:  Yes      Anticipated Discharge Dispo:  home on IV ABX    DME Needed: No    Action(s) Taken: Updated Provider/Nurse on Discharge Plan, Patient Conference, Choice obtained, Referral(s) sent, DME Face to Face , and OTHER    Escalations Completed: None    Medically Clear: Yes    Next Steps: Rev'd with BSN who spoke to [facility] Case mngr, Rahul [phone], and Rahul states pt will return to his Independent living unit with Right at Home assistance and the plan is to move him to the Assisted Living side when all is in order.   The [facility] paperwork has been completed by CM and Dr Fernandez and will be faxed to Rahul at [phone].     BSN states the Covid test was cancelled but she will resend it. Per Rahul, it is ok for pt to return to [facility] as along as not symptomatic for 3 days, which he is not.     Rahul to call pt's friend and have them pick him up tomorrow morning to   [facility].     Voalte texted team that pt to dc in AM with a ride from a friend.     Barriers to Discharge: Pending Placement and Pending Insurance Authorization    Is the patient up for discharge tomorrow: Yes    Is transport arranged for discharge disposition: No

## 2022-10-24 NOTE — CARE PLAN
The patient is Watcher - Medium risk of patient condition declining or worsening    Shift Goals  Clinical Goals: pain management, IV antibiotics  Patient Goals: pain control    Progress made toward(s) clinical / shift goals:  PRN pain meds onboard for back, L hip pain, MD updated on pts pain, toradol on board, tolerated.     Patient is not progressing towards the following goals: N/A

## 2022-10-24 NOTE — THERAPY
"Physical Therapy   Initial Evaluation     Patient Name: Yonis Ward  Age:  52 y.o., Sex:  male  Medical Record #: [id number]  Today's Date: 10/24/2022    Precautions: Fall Risk    Assessment  Patient is a 52 y.o. male with c/o incr back pain over the last 2 months. Pt reports since admit has experienced severe L hip pain. Lumbar imaging showing acute discitis osteomyelitis at L4-5. Pt had bx 10/21. Pt seen for PT evaluation at this time. Pt with some difficulty specifying pain, reports back pain with transitional movements and L hip pain in sitting and with AROM, AROM WFL. Pt with some relief with L hip flexed and in ER, c/o \"razorblade\" pain in calves and quads, able to complete SLR with some anterior appearing pain. Difficult to pinpoint exact pain d/t varying symptoms during movement. Pt tolerated a few steps near EOB though unable to stand long enough for ambulation. Pt moved self sup <> sit multiples times during session. Pt reports ultimately feeling like he would be successful moving around at home with help from parents if needed. Pt will have a FWW from a neighbor. PT will follow while in-house.     Plan  Recommend Physical Therapy 3 times per week until therapy goals are met for the following treatments:  Bed Mobility, Gait Training, Neuro Re-Education / Balance, Self Care/Home Evaluation, Stair Training, Therapeutic Activities, and Therapeutic Exercises  DC Equipment Recommendations: Unable to determine at this time  Discharge Recommendations: pt reports no concerns with mobility with return home, likely would be capable with walker and assist from parents. pt reports unable to have HHPT, OPPT, or rehab, though would benefit from OPPT f/u.        10/24/22 8668   Prior Living Situation   Housing / Facility 1 Story House   Steps Into Home 2   Steps In Home 0   Bathroom Set up Shower Chair   Equipment Owned Front-Wheel Walker (from a neighbor)   Lives with -  Parents   Comments pt reports has been " staying at his parents so he doesnt have to manage stairs and has assist if needed   Prior Level of Functional Mobility   Bed Mobility Independent   Transfer Status Independent   Ambulation Independent   Distance Ambulation (Feet) community distances   Assistive Devices Used None   Stairs Independent   Comments recently has needed some assist d/t incr pain   Cognition    Level of Consciousness Alert   Comments pt very fidgety and with difficulty focusing due to pain. pt has hard time describing pain and c/o switching back and hip pain. pt impulsive throughout and moved self in/out of bed various times. pt not making eye contact with therapist at all throughout session.   Balance Assessment   Sitting Balance (Static) Fair   Sitting Balance (Dynamic) Fair   Standing Balance (Static) Fair   Standing Balance (Dynamic) Fair   Weight Shift Sitting Fair   Weight Shift Standing Fair   Comments no AD, difficulty maintaining sitting position d/t hip pain   Gait Analysis   Comments pt able to take a few steps near bed and in a [place], unwilling to attempt incr amb or with walker, no LOB but standing and stepping with posterior lean/incr hip ext   Bed Mobility    Supine to Sit Supervised   Sit to Supine Supervised   Scooting Supervised   Rolling Supervised   Functional Mobility   Sit to Stand Supervised   Short Term Goals    Short Term Goal # 1 pt will ambulate 150ft with FWW and SPV in 6 visits to access home   Short Term Goal # 2 pt will negotiate 2 steps with SPV in 6 visits to access home

## 2022-10-24 NOTE — DISCHARGE PLANNING
Case Management Discharge Planning    Admission Date: 10/19/2022  GMLOS: 6.2  ALOS: 5    6-Clicks ADL Score: 20  6-Clicks Mobility Score: 13  PT and/or OT Eval ordered: NA  Post-acute Referrals Ordered: Yes  Post-acute Choice Obtained: Yes  Has referral(s) been sent to post-acute provider:  Yes      Anticipated Discharge Dispo: Discharge Disposition: D/T to home under [facility] care in anticipation of covered skilled care (06)    DME Needed: No    Action(s) Taken: Updated Provider/Nurse on Discharge Plan, Patient Conference, Choice obtained, Referral(s) sent, DME Face to Face , Family Conference, and OTHER    Escalations Completed: None    Medically Clear: Yes    Next Steps: Plan dc home to parents house with Daptomycin IV ABX till 12/2/22.     Rev'd dc plan with pt and parents at bedside. Infusion referral to be sent to [facility]. This CM spoke with [facility] liabenita Prajapati and she will review referral and likely teach the pt and family at bedside tomorrow how to do the infusions.     Voalte texted team that pt needs to have 1 dose of Dapto before he can  Dc per [facility] rules.      Pt needs HH for weekly picc line dressing changes and labs if needed. Pt to have picc placed today. Pt has [employer] insurance. His PCP is Dr Vasile Terrell. Per AMANDA PÑEA Note from Dr Moreno, pt to follow up in their office also.   Referral for HH to be sent to [facility] or [facility].     Pt to dc to parents home. Parents address is [address] [place] [id number].     Referrals faxed to [facility] to send.     \Voalte texted Dr ERNST that HH order needs to say weekly picc line dressign changes and any labs she wants HH to draw.     Barriers to Discharge: Pending Insurance Authorization, Outpatient Infusion required, and Outpatient referrals pending    Is the patient up for discharge tomorrow: Yes    Is transport arranged for discharge disposition: No    Addendum at 13:30-per voalte text with Dr Ernst-pt unable to walk. MD ordering  PT to see pt. There are concerns about his mobility at home.      Addendum at 15:45-per Victorina at [facility]-the pt's will be charged $688.58 per week. Victorina will review with pt.

## 2022-10-24 NOTE — CARE PLAN
The patient is Stable - Low risk of patient condition declining or worsening    Shift Goals  Clinical Goals: pain management, IV antibiotics  Patient Goals: pain control    Progress made toward(s) clinical / shift goals:  Patient progressing towards goals. Call light within reach. Bed alarm on.   Problem: Pain - Standard  Goal: Alleviation of pain or a reduction in pain to the patient’s comfort goal  Outcome: Progressing     Problem: Knowledge Deficit - Standard  Goal: Patient and family/care givers will demonstrate understanding of plan of care, disease process/condition, diagnostic tests and medications  Outcome: Progressing     Problem: Skin Integrity  Goal: Skin integrity is maintained or improved  Outcome: Progressing       Patient is not progressing towards the following goals:

## 2022-10-24 NOTE — DISCHARGE PLANNING
Pt is staying at parent's home:  [address]  [id number]    Received Choice form at 1156  Agency/Facility Name: [facility]  Referral sent per Choice form @ 1228    @1236  Received Choice form at 1156  Agency/Facility Name: [facility]  Referral sent per Choice form @ 123    @1240  DPA faxed the infusion order to [facility].

## 2022-10-25 ENCOUNTER — APPOINTMENT (OUTPATIENT)
Dept: RADIOLOGY | Facility: MEDICAL CENTER | Age: 52
DRG: 477 | End: 2022-10-25
Attending: STUDENT IN AN ORGANIZED HEALTH CARE EDUCATION/TRAINING PROGRAM
Payer: COMMERCIAL

## 2022-10-25 DIAGNOSIS — M46.20 OSTEOMYELITIS OF VERTEBRA (HCC): ICD-10-CM

## 2022-10-25 LAB
ALBUMIN SERPL BCP-MCNC: 3.8 G/DL (ref 3.2–4.9)
ALBUMIN/GLOB SERPL: 1.1 G/DL
ALP SERPL-CCNC: 83 U/L (ref 30–99)
ALT SERPL-CCNC: 13 U/L (ref 2–50)
ANION GAP SERPL CALC-SCNC: 9 MMOL/L (ref 7–16)
AST SERPL-CCNC: 12 U/L (ref 12–45)
BACTERIA BLD CULT: NORMAL
BILIRUB SERPL-MCNC: 0.3 MG/DL (ref 0.1–1.5)
BUN SERPL-MCNC: 11 MG/DL (ref 8–22)
CALCIUM SERPL-MCNC: 9 MG/DL (ref 8.5–10.5)
CHLORIDE SERPL-SCNC: 100 MMOL/L (ref 96–112)
CK SERPL-CCNC: 22 U/L (ref 0–154)
CO2 SERPL-SCNC: 27 MMOL/L (ref 20–33)
CREAT SERPL-MCNC: 0.65 MG/DL (ref 0.5–1.4)
GFR SERPLBLD CREATININE-BSD FMLA CKD-EPI: 114 ML/MIN/1.73 M 2
GLOBULIN SER CALC-MCNC: 3.5 G/DL (ref 1.9–3.5)
GLUCOSE SERPL-MCNC: 101 MG/DL (ref 65–99)
POTASSIUM SERPL-SCNC: 4.6 MMOL/L (ref 3.6–5.5)
PROT SERPL-MCNC: 7.3 G/DL (ref 6–8.2)
SIGNIFICANT IND 70042: NORMAL
SITE SITE: NORMAL
SODIUM SERPL-SCNC: 136 MMOL/L (ref 135–145)
SOURCE SOURCE: NORMAL
VANCOMYCIN TROUGH SERPL-MCNC: 21.2 UG/ML (ref 10–20)

## 2022-10-25 PROCEDURE — A9270 NON-COVERED ITEM OR SERVICE: HCPCS | Performed by: STUDENT IN AN ORGANIZED HEALTH CARE EDUCATION/TRAINING PROGRAM

## 2022-10-25 PROCEDURE — 700105 HCHG RX REV CODE 258: Performed by: STUDENT IN AN ORGANIZED HEALTH CARE EDUCATION/TRAINING PROGRAM

## 2022-10-25 PROCEDURE — 700102 HCHG RX REV CODE 250 W/ 637 OVERRIDE(OP): Performed by: STUDENT IN AN ORGANIZED HEALTH CARE EDUCATION/TRAINING PROGRAM

## 2022-10-25 PROCEDURE — 700111 HCHG RX REV CODE 636 W/ 250 OVERRIDE (IP): Performed by: STUDENT IN AN ORGANIZED HEALTH CARE EDUCATION/TRAINING PROGRAM

## 2022-10-25 PROCEDURE — A9576 INJ PROHANCE MULTIPACK: HCPCS | Performed by: STUDENT IN AN ORGANIZED HEALTH CARE EDUCATION/TRAINING PROGRAM

## 2022-10-25 PROCEDURE — 72158 MRI LUMBAR SPINE W/O & W/DYE: CPT

## 2022-10-25 PROCEDURE — 80053 COMPREHEN METABOLIC PANEL: CPT

## 2022-10-25 PROCEDURE — 82550 ASSAY OF CK (CPK): CPT

## 2022-10-25 PROCEDURE — 80202 ASSAY OF VANCOMYCIN: CPT

## 2022-10-25 PROCEDURE — 700117 HCHG RX CONTRAST REV CODE 255: Performed by: STUDENT IN AN ORGANIZED HEALTH CARE EDUCATION/TRAINING PROGRAM

## 2022-10-25 PROCEDURE — 700102 HCHG RX REV CODE 250 W/ 637 OVERRIDE(OP)

## 2022-10-25 PROCEDURE — 700101 HCHG RX REV CODE 250

## 2022-10-25 PROCEDURE — A9270 NON-COVERED ITEM OR SERVICE: HCPCS

## 2022-10-25 PROCEDURE — 770001 HCHG ROOM/CARE - MED/SURG/GYN PRIV*

## 2022-10-25 PROCEDURE — 36415 COLL VENOUS BLD VENIPUNCTURE: CPT

## 2022-10-25 RX ORDER — 0.9 % SODIUM CHLORIDE 0.9 %
10 VIAL (ML) INJECTION PRN
Status: CANCELLED | OUTPATIENT
Start: 2022-10-26

## 2022-10-25 RX ORDER — HEPARIN SODIUM (PORCINE) LOCK FLUSH IV SOLN 100 UNIT/ML 100 UNIT/ML
500 SOLUTION INTRAVENOUS PRN
Status: CANCELLED | OUTPATIENT
Start: 2022-10-26

## 2022-10-25 RX ORDER — AMLODIPINE BESYLATE 5 MG/1
5 TABLET ORAL
Status: DISCONTINUED | OUTPATIENT
Start: 2022-10-25 | End: 2022-10-26 | Stop reason: HOSPADM

## 2022-10-25 RX ORDER — 0.9 % SODIUM CHLORIDE 0.9 %
3 VIAL (ML) INJECTION PRN
Status: CANCELLED | OUTPATIENT
Start: 2022-10-26

## 2022-10-25 RX ORDER — 0.9 % SODIUM CHLORIDE 0.9 %
VIAL (ML) INJECTION PRN
Status: CANCELLED | OUTPATIENT
Start: 2022-10-26

## 2022-10-25 RX ADMIN — AMLODIPINE BESYLATE 5 MG: 5 TABLET ORAL at 11:16

## 2022-10-25 RX ADMIN — SENNOSIDES AND DOCUSATE SODIUM 2 TABLET: 50; 8.6 TABLET ORAL at 17:37

## 2022-10-25 RX ADMIN — VANCOMYCIN HYDROCHLORIDE 1000 MG: 500 INJECTION, POWDER, LYOPHILIZED, FOR SOLUTION INTRAVENOUS at 01:22

## 2022-10-25 RX ADMIN — DAPTOMYCIN 440 MG: 500 INJECTION, POWDER, LYOPHILIZED, FOR SOLUTION INTRAVENOUS at 12:45

## 2022-10-25 RX ADMIN — CYCLOBENZAPRINE 5 MG: 10 TABLET, FILM COATED ORAL at 04:34

## 2022-10-25 RX ADMIN — HYDROCODONE BITARTRATE AND ACETAMINOPHEN 2 TABLET: 5; 325 TABLET ORAL at 03:47

## 2022-10-25 RX ADMIN — GABAPENTIN 300 MG: 300 CAPSULE ORAL at 04:34

## 2022-10-25 RX ADMIN — LIDOCAINE 1 PATCH: 50 PATCH TOPICAL at 04:35

## 2022-10-25 RX ADMIN — GABAPENTIN 300 MG: 300 CAPSULE ORAL at 11:16

## 2022-10-25 RX ADMIN — HYDROCODONE BITARTRATE AND ACETAMINOPHEN 2 TABLET: 5; 325 TABLET ORAL at 13:31

## 2022-10-25 RX ADMIN — HYDROCODONE BITARTRATE AND ACETAMINOPHEN 2 TABLET: 5; 325 TABLET ORAL at 20:07

## 2022-10-25 RX ADMIN — HYDROMORPHONE HYDROCHLORIDE 1 MG: 1 INJECTION, SOLUTION INTRAMUSCULAR; INTRAVENOUS; SUBCUTANEOUS at 21:05

## 2022-10-25 RX ADMIN — GABAPENTIN 300 MG: 300 CAPSULE ORAL at 17:37

## 2022-10-25 RX ADMIN — GADOTERIDOL 10 ML: 279.3 INJECTION, SOLUTION INTRAVENOUS at 16:58

## 2022-10-25 ASSESSMENT — PAIN DESCRIPTION - PAIN TYPE: TYPE: ACUTE PAIN

## 2022-10-25 NOTE — PROGRESS NOTES
Patient A&Ox4, ambulating without assistance, gait steady, does not appear in distress. Denies any new concerns or complaints. Patient agrees to complete MRI for discharge.

## 2022-10-25 NOTE — PROGRESS NOTES
Patient alert and oriented x4. VSS. Up with assist x2 to bedside commode. Patient has weakness, numbness, and tingling in BLE when bearing weight on legs. MD aware. MRI ordered. IR site to lower back clean, dry, and intact. Voiding adequately via urinal. Last BM 10/24. Tolerating diet well. Complaints of shooting pain down BLE when moving. PRN flexeril and Norco given for relief in addition to scheduled gabapentin. PICC line to RUE placed today.

## 2022-10-25 NOTE — PROGRESS NOTES
Alert x4, bed alarm placed. Appears frustrated this evening and expressing wanting to go home and wishes he's never come into the hospital.  Ambulated with 2 person assist to the bathroom, unsteady gait and lack of control- PT/OT on board

## 2022-10-25 NOTE — PROGRESS NOTES
Patient refused to go down with transport for MRI. Education provided on importance of imaging. Patient continued to refuse.

## 2022-10-25 NOTE — PROGRESS NOTES
"Pharmacy Vancomycin Kinetics Note for 10/25/2022   52 y.o. male on Vancomycin day # 6     Vancomycin Indication (AUC Dosing):  Epidural Abscess (goal trough 15-20)    Provider specified end date: 12/02/22    Active Antibiotics (From admission, onward)      Ordered     Ordering Provider       Tue Oct 25, 2022  9:39 AM    10/25/22 0939  vancomycin (VANCOCIN) 750 mg in  mL IVPB  (vancomycin (VANCOCIN) IV (LD + Maintenance))  EVERY 12 HOURS        Note to Pharmacy: Per P&T Kinetics Protocol    Niki Fernandez M.D.       Mon Oct 24, 2022  8:32 AM    10/24/22 0832  MD Alert...Vancomycin per Pharmacy  PHARMACY TO DOSE        Note to Pharmacy: Per P&T antimicrobial duration of therapy Protocol   Question:  Indication(s) for vancomycin?  Answer:  Epidural abscess/vertebral osteomyelitis    Niki Fernandez M.D.          Dosing Weight: 54.6 kg (120 lb 5.9 oz)    Admission History: Admitted on 10/19/2022 for Osteomyelitis of vertebra (HCC) [M46.20]  Pertinent history: Concern for SSTI and epidural abscess/vertebral osteomyelitis. Antimicrobials initiated.    Allergies:  Patient has no known allergies.     Pertinent cultures to date:   Results       Procedure Component Value Units Date/Time    Blood Culture [[id number]] Collected: 10/19/22 2239    Order Status: Completed Specimen: Blood from Peripheral Updated: 10/25/22 0100     Significant Indicator NEG     Source BLD     Site PERIPHERAL     Culture Result No growth after 5 days of incubation.    Narrative:      2 of 2 blood culture x2  Sites order. Per Hospital Policy:  Only change Specimen Src: to \"Line\" if specified by physician  order.  Right Hand    AFB Culture [[id number]] Collected: 10/21/22 1730    Order Status: Completed Specimen: Tissue Updated: 10/23/22 1314     Significant Indicator NEG     Source TISS     Site Perispinous phlegmon     Culture Result Culture in progress.     AFB Smear Results No acid fast bacilli seen.    CULTURE TISSUE W/ GRM STAIN [[id number]]  (Abnormal) " Collected: 10/21/22 1730    Order Status: Completed Specimen: Tissue Updated: 10/23/22 1314     Significant Indicator POS     Source TISS     Site Perispinous phlegmon     Culture Result -     Gram Stain Result No organisms seen.     Culture Result Staphylococcus aureus  Light growth  Methicillin Resistant via screening method.  See previous culture for sensitivity report.      Fungal Culture [[id number]] Collected: 10/21/22 1730    Order Status: Completed Specimen: Tissue Updated: 10/23/22 1314     Significant Indicator NEG     Source TISS     Site Perispinous phlegmon     Culture Result Culture in progress.     Fungal Smear Results No fungal elements seen.    CULTURE TISSUE W/ GRM STAIN [[id number]]  (Abnormal)  (Susceptibility) Collected: 10/21/22 1730    Order Status: Completed Specimen: Tissue Updated: 10/23/22 1314     Significant Indicator POS     Source TISS     Site L4-L5     Culture Result -     Gram Stain Result No organisms seen.     Culture Result Methicillin Resistant Staphylococcus aureus  Light growth      Fungal Culture [[id number]] Collected: 10/21/22 1730    Order Status: Completed Specimen: Tissue Updated: 10/23/22 1314     Significant Indicator NEG     Source TISS     Site L4-L5     Culture Result Culture in progress.     Fungal Smear Results No fungal elements seen.    AFB Culture [[id number]] Collected: 10/21/22 1730    Order Status: Completed Specimen: Tissue Updated: 10/23/22 1314     Significant Indicator NEG     Source TISS     Site L4-L5     Culture Result Culture in progress.     AFB Smear Results No acid fast bacilli seen.    GRAM STAIN [[id number]] Collected: 10/21/22 1730    Order Status: Completed Specimen: Tissue Updated: 10/22/22 1911     Significant Indicator .     Source TISS     Site Perispinous phlegmon     Gram Stain Result No organisms seen.    Fungal Smear [[id number]] Collected: 10/21/22 1730    Order Status: Completed Specimen: Tissue Updated: 10/22/22 1911     Significant  "Indicator NEG     Source TISS     Site Perispinous phlegmon     Fungal Smear Results No fungal elements seen.    Acid Fast Stain [[id number]] Collected: 10/21/22 1730    Order Status: Completed Specimen: Tissue Updated: 10/22/22 1911     Significant Indicator NEG     Source TISS     Site Perispinous phlegmon     AFB Smear Results No acid fast bacilli seen.    GRAM STAIN [[id number]] Collected: 10/21/22 1730    Order Status: Completed Specimen: Tissue Updated: 10/22/22 1911     Significant Indicator .     Source TISS     Site L4-L5     Gram Stain Result No organisms seen.    Fungal Smear [[id number]] Collected: 10/21/22 1730    Order Status: Completed Specimen: Tissue Updated: 10/22/22 1911     Significant Indicator NEG     Source TISS     Site L4-L5     Fungal Smear Results No fungal elements seen.    Acid Fast Stain [[id number]] Collected: 10/21/22 1730    Order Status: Completed Specimen: Tissue Updated: 10/22/22 1911     Significant Indicator NEG     Source TISS     Site L4-L5     AFB Smear Results No acid fast bacilli seen.    BLOOD CULTURE [[id number]] Collected: 10/21/22 1251    Order Status: Completed Specimen: Blood from Peripheral Updated: 10/22/22 0752     Significant Indicator NEG     Source BLD     Site PERIPHERAL     Culture Result No Growth  Note: Blood cultures are incubated for 5 days and  are monitored continuously.Positive blood cultures  are called to the RN and reported as soon as  they are identified.      Narrative:      Per Hospital Policy: Only change Specimen Src: to \"Line\" if  specified by physician order.  Right Hand    BLOOD CULTURE [[id number]] Collected: 10/21/22 1251    Order Status: Completed Specimen: Blood from Peripheral Updated: 10/22/22 0752     Significant Indicator NEG     Source BLD     Site PERIPHERAL     Culture Result No Growth  Note: Blood cultures are incubated for 5 days and  are monitored continuously.Positive blood cultures  are called to the RN and reported as soon as  they " "are identified.      Narrative:      Per Hospital Policy: Only change Specimen Src: to \"Line\" if  specified by physician order.  Left AC    FLUID CULTURE W/GRAM STAIN [[id number]]     Order Status: No result Specimen: Other Body Fluid     Fungal Culture [[id number]]     Order Status: No result Specimen: Other Body Fluid     AFB Culture [[id number]]     Order Status: No result Specimen: Body Fluid from Abscess     FLUID CULTURE W/GRAM STAIN [[id number]]     Order Status: No result Specimen: Bone from Other Body Fluid     Fungal Culture [[id number]]     Order Status: No result Specimen: Bone     AFB Culture [[id number]]     Order Status: No result Specimen: Bone     Fungal Culture [[id number]]     Order Status: No result Specimen: Respirate from Bone     MRSA By PCR (Amp) [[id number]] Collected: 10/21/22 1242    Order Status: Completed Specimen: Respirate from Nares Updated: 10/21/22 1708     MRSA by PCR Negative    Narrative:      Collected By: [id number] ISACC GRAY    Blood Culture [[id number]]  (Abnormal) Collected: 10/19/22 2157    Order Status: Completed Specimen: Blood from Peripheral Updated: 10/21/22 1021     Significant Indicator POS     Source BLD     Site Peripheral     Culture Result Growth detected by Bactec instrument. 10/20/2022  23:03      Bacillus species  Possible Contaminant  Isolated from one set only, please correlate with clinical  condition. Contact the Microbiology department within 48 hr  if identification and susceptibility are needed.      Narrative:      CALL  [facility]  61 tel. [phone],  CALLED  61 tel. [phone] 10/20/2022, 23:06, RB PERF. RESULTS CALLED  TO:[id number]  1 of 2 for Blood Culture x 2 sites order. Per Hospital  Policy: Only change Specimen Src: to \"Line\" if specified by  physician order.  No site indicated          Labs:   Estimated Creatinine Clearance: 105 mL/min (by C-G formula based on SCr of 0.65 mg/dL).    Recent Labs     10/23/22  0314   WBC 5.5     Recent Labs     " "10/23/22  0314 10/25/22  0837   BUN 11 11   CREATININE 0.61 0.65   ALBUMIN 3.4 3.8     Intake/Output Summary (Last 24 hours) at 10/25/2022 0942  Last data filed at 10/25/2022 0730  Gross per 24 hour   Intake 1000 ml   Output 3500 ml   Net -2500 ml      BP (!) 162/108   Pulse 95   Temp 36.6 °C (97.9 °F) (Temporal)   Resp 18   Ht 1.676 m (5' 6\")   Wt 54.6 kg (120 lb 5.9 oz)   SpO2 98%  Temp (24hrs), Av.8 °C (98.2 °F), Min:36.6 °C (97.8 °F), Max:37 °C (98.6 °F)    List concerns for Vancomycin clearance:   Age;BUN/Scr ratio greater than 20:1;Receipt of contrast dye    Pharmacokinetics:  Trough kinetics:   Recent Labs     10/25/22  0837   VANCOTROUGH 21.2*     A/P:   -  Vancomycin dose: 750 mg iv q12h (~14 mg/kg/dose)    -  Next vancomycin level(s):    -TBD    - Calculated AUC (from trough): 1068 mg·hr/L    -  Comments: Calculated AUC from trough today above target for vancomycin therapy (previous AUC assessment ~282 mg·hr/L noted 10/22/22). Plan for dose adjustment to target vancomycin AUC of ~500 mg·hr/L. Previous vancomycin dosing noted. Likely to repeat vancomycin peak and trough in 5-7 days to assess pending clinical course.    Duc Bingham, PharmD  "

## 2022-10-25 NOTE — DISCHARGE PLANNING
Case Management Discharge Planning    Admission Date: 10/19/2022  GMLOS: 6.2  ALOS: 6    6-Clicks ADL Score: 20  6-Clicks Mobility Score: 18      Anticipated Discharge Dispo: Discharge Disposition: D/T to home under [facility] care in anticipation of covered skilled care (06)    DME Needed: No    Action(s) Taken: Updated Provider/Nurse on Discharge Plan, Patient Conference, Authorization Received , and OTHER    Escalations Completed: None    Medically Clear: Yes    Next Steps: Per Victorina with [facility] company-pt states he cannot afford the copay of $688 a week. This cm rev'd with Dr Fernandez and she will review with AMANDA PEÑA to see if there is a cheaper alternative IV ABX or PO ABX.     After further discussion between Dr Fernandez and pt- he now states he will be able to pay the copay. This cm called Victorina back and she will have [facility] call pt back to arrange payment plan.     Voalte texted CHARLES Morales and Dr Fernandez that pt must have a dose of IV Dapto prior to dc.     Pt refused MRI ordered by Dr Fernandez.     Barriers to Discharge: Outpatient Infusion required    Is the patient up for discharge tomorrow: No

## 2022-10-26 ENCOUNTER — PHARMACY VISIT (OUTPATIENT)
Dept: PHARMACY | Facility: MEDICAL CENTER | Age: 52
End: 2022-10-26
Payer: COMMERCIAL

## 2022-10-26 VITALS
HEIGHT: 66 IN | OXYGEN SATURATION: 97 % | HEART RATE: 99 BPM | DIASTOLIC BLOOD PRESSURE: 72 MMHG | RESPIRATION RATE: 16 BRPM | TEMPERATURE: 97.5 F | BODY MASS INDEX: 19.35 KG/M2 | WEIGHT: 120.37 LBS | SYSTOLIC BLOOD PRESSURE: 114 MMHG

## 2022-10-26 LAB
ANION GAP SERPL CALC-SCNC: 11 MMOL/L (ref 7–16)
BACTERIA BLD CULT: NORMAL
BACTERIA BLD CULT: NORMAL
BUN SERPL-MCNC: 15 MG/DL (ref 8–22)
CALCIUM SERPL-MCNC: 9.4 MG/DL (ref 8.5–10.5)
CHLORIDE SERPL-SCNC: 98 MMOL/L (ref 96–112)
CO2 SERPL-SCNC: 26 MMOL/L (ref 20–33)
CREAT SERPL-MCNC: 0.5 MG/DL (ref 0.5–1.4)
GFR SERPLBLD CREATININE-BSD FMLA CKD-EPI: 124 ML/MIN/1.73 M 2
GLUCOSE SERPL-MCNC: 114 MG/DL (ref 65–99)
POTASSIUM SERPL-SCNC: 4.4 MMOL/L (ref 3.6–5.5)
SIGNIFICANT IND 70042: NORMAL
SIGNIFICANT IND 70042: NORMAL
SITE SITE: NORMAL
SITE SITE: NORMAL
SODIUM SERPL-SCNC: 135 MMOL/L (ref 135–145)
SOURCE SOURCE: NORMAL
SOURCE SOURCE: NORMAL

## 2022-10-26 PROCEDURE — A9270 NON-COVERED ITEM OR SERVICE: HCPCS | Performed by: STUDENT IN AN ORGANIZED HEALTH CARE EDUCATION/TRAINING PROGRAM

## 2022-10-26 PROCEDURE — 36415 COLL VENOUS BLD VENIPUNCTURE: CPT

## 2022-10-26 PROCEDURE — 700102 HCHG RX REV CODE 250 W/ 637 OVERRIDE(OP): Performed by: STUDENT IN AN ORGANIZED HEALTH CARE EDUCATION/TRAINING PROGRAM

## 2022-10-26 PROCEDURE — 700102 HCHG RX REV CODE 250 W/ 637 OVERRIDE(OP)

## 2022-10-26 PROCEDURE — A9270 NON-COVERED ITEM OR SERVICE: HCPCS

## 2022-10-26 PROCEDURE — 700111 HCHG RX REV CODE 636 W/ 250 OVERRIDE (IP): Performed by: STUDENT IN AN ORGANIZED HEALTH CARE EDUCATION/TRAINING PROGRAM

## 2022-10-26 PROCEDURE — RXMED WILLOW AMBULATORY MEDICATION CHARGE: Performed by: STUDENT IN AN ORGANIZED HEALTH CARE EDUCATION/TRAINING PROGRAM

## 2022-10-26 PROCEDURE — 700105 HCHG RX REV CODE 258: Performed by: STUDENT IN AN ORGANIZED HEALTH CARE EDUCATION/TRAINING PROGRAM

## 2022-10-26 PROCEDURE — 99239 HOSP IP/OBS DSCHRG MGMT >30: CPT | Performed by: STUDENT IN AN ORGANIZED HEALTH CARE EDUCATION/TRAINING PROGRAM

## 2022-10-26 PROCEDURE — 97165 OT EVAL LOW COMPLEX 30 MIN: CPT

## 2022-10-26 PROCEDURE — 80048 BASIC METABOLIC PNL TOTAL CA: CPT

## 2022-10-26 PROCEDURE — 97530 THERAPEUTIC ACTIVITIES: CPT

## 2022-10-26 PROCEDURE — 700101 HCHG RX REV CODE 250

## 2022-10-26 RX ORDER — GABAPENTIN 300 MG/1
300 CAPSULE ORAL 3 TIMES DAILY
Qty: 90 CAPSULE | Refills: 0 | Status: SHIPPED | OUTPATIENT
Start: 2022-10-26 | End: 2022-11-25

## 2022-10-26 RX ORDER — OXYCODONE HYDROCHLORIDE 5 MG/1
5 TABLET ORAL EVERY 4 HOURS PRN
Status: DISCONTINUED | OUTPATIENT
Start: 2022-10-26 | End: 2022-10-26 | Stop reason: HOSPADM

## 2022-10-26 RX ORDER — OXYCODONE HYDROCHLORIDE 10 MG/1
10 TABLET ORAL EVERY 4 HOURS PRN
Status: DISCONTINUED | OUTPATIENT
Start: 2022-10-26 | End: 2022-10-26 | Stop reason: HOSPADM

## 2022-10-26 RX ORDER — LIDOCAINE 50 MG/G
1 PATCH TOPICAL EVERY 24 HOURS
Qty: 7 PATCH | Refills: 0 | Status: SHIPPED | OUTPATIENT
Start: 2022-10-27 | End: 2022-11-03

## 2022-10-26 RX ORDER — OXYCODONE HYDROCHLORIDE 10 MG/1
10 TABLET ORAL EVERY 8 HOURS PRN
Qty: 15 TABLET | Refills: 0 | Status: SHIPPED | OUTPATIENT
Start: 2022-10-26 | End: 2022-10-31

## 2022-10-26 RX ORDER — AMLODIPINE BESYLATE 5 MG/1
5 TABLET ORAL DAILY
Qty: 30 TABLET | Refills: 0 | Status: SHIPPED | OUTPATIENT
Start: 2022-10-27 | End: 2022-11-15 | Stop reason: SDUPTHER

## 2022-10-26 RX ADMIN — SENNOSIDES AND DOCUSATE SODIUM 2 TABLET: 50; 8.6 TABLET ORAL at 05:16

## 2022-10-26 RX ADMIN — AMLODIPINE BESYLATE 5 MG: 5 TABLET ORAL at 05:17

## 2022-10-26 RX ADMIN — OXYCODONE 5 MG: 5 TABLET ORAL at 05:18

## 2022-10-26 RX ADMIN — OXYCODONE HYDROCHLORIDE 10 MG: 10 TABLET ORAL at 11:41

## 2022-10-26 RX ADMIN — GABAPENTIN 300 MG: 300 CAPSULE ORAL at 12:55

## 2022-10-26 RX ADMIN — GABAPENTIN 300 MG: 300 CAPSULE ORAL at 05:17

## 2022-10-26 RX ADMIN — LIDOCAINE 1 PATCH: 50 PATCH TOPICAL at 05:17

## 2022-10-26 RX ADMIN — POLYETHYLENE GLYCOL 3350 1 PACKET: 17 POWDER, FOR SOLUTION ORAL at 05:17

## 2022-10-26 RX ADMIN — DAPTOMYCIN 440 MG: 500 INJECTION, POWDER, LYOPHILIZED, FOR SOLUTION INTRAVENOUS at 12:55

## 2022-10-26 ASSESSMENT — COGNITIVE AND FUNCTIONAL STATUS - GENERAL
HELP NEEDED FOR BATHING: A LITTLE
STANDING UP FROM CHAIR USING ARMS: A LITTLE
TOILETING: A LITTLE
SUGGESTED CMS G CODE MODIFIER MOBILITY: CK
WALKING IN HOSPITAL ROOM: A LITTLE
TURNING FROM BACK TO SIDE WHILE IN FLAT BAD: A LITTLE
MOVING FROM LYING ON BACK TO SITTING ON SIDE OF FLAT BED: A LITTLE
DAILY ACTIVITIY SCORE: 20
DRESSING REGULAR LOWER BODY CLOTHING: A LITTLE
MOBILITY SCORE: 18
DRESSING REGULAR UPPER BODY CLOTHING: A LITTLE
MOVING TO AND FROM BED TO CHAIR: A LITTLE
SUGGESTED CMS G CODE MODIFIER DAILY ACTIVITY: CJ
CLIMB 3 TO 5 STEPS WITH RAILING: A LITTLE

## 2022-10-26 ASSESSMENT — ACTIVITIES OF DAILY LIVING (ADL): TOILETING: INDEPENDENT

## 2022-10-26 ASSESSMENT — GAIT ASSESSMENTS
DISTANCE (FEET): 30
GAIT LEVEL OF ASSIST: STANDBY ASSIST
ASSISTIVE DEVICE: FRONT WHEEL WALKER
DEVIATION: ANTALGIC

## 2022-10-26 NOTE — DISCHARGE SUMMARY
Discharge Summary    CHIEF COMPLAINT ON ADMISSION  Chief Complaint   Patient presents with    Back Pain     Severe lower back pain that has steadily gotten worse for the last two months. Denies injury, numbness/tingling. Occasional weakness down left leg. PIV placed with EMS, pt received 100mcg fentanyl and 1mg versed        Reason for Admission  EMS     Admission Date  10/19/2022    CODE STATUS  Full Code    HPI & HOSPITAL COURSE  Yonis Ward is a 52 y.o. male without any chronic medical conditions not on any long-term medications who presented 10/19/2022 with severe low back pain.  Patient first began experiencing back pain in mid August 2022.  No trauma or inciting events,   His back pain is waxed and waned over the past 2 months from mild to severe.  He has no weakness or numbness or tingling, no saddle anesthesia, no bowel or bladder incontinence.       lumbar x-ray showed acute discitis osteomyelitis at the L4-5 level. MRI  shows L4-5 discitis/osteomyelitis and Prevertebral phlegmon.      S/p L4-L5 disc biopsy had a bowel movement today had a bowel movement and left paravertebral phlegmon 10/21. S/p L4-L5 disc biopsy and left paravertebral phlegmon 10/21. culture positive for MRSA. ID following. Patient has been treated with vancomycin, transitioned to daptomycin at the discharge to complete a 6 weeks course. PICC line placed.  Home infusion arranged.  Antibiotics ordered by ID  Outpatient repeat lumbar MRI ordered per ID, to be completed prior to completion of IV antibiotics.     Patient reported more leg pain and numbness.  Repeated lumbar MRI with contrast showed no significant interval changes.  I talked to neurosurgeon Dr. Tinajero, no surgery intervention needed.  Recommended to follow-up as outpatient.     PT OT reevaluated, recommended home health.  Patient already has walker at home.     Therefore, he is discharged in good and stable condition to home with organized home healthcare and close  outpatient follow-up.    The patient met 2-midnight criteria for an inpatient stay at the time of discharge.    Discharge Date  10/26/2022    FOLLOW UP ITEMS POST DISCHARGE  - Follow up with primary care physician in 1 week.   - Follow up with neurosurgeon as instructed  -Follow-up with infectious disease as instructed    - Please take the medications as instructed    - Go to the local Emergency Department if you have any worsening condition.       DISCHARGE DIAGNOSES  Principal Problem:    Osteomyelitis of vertebra, epidural abscess (HCC) POA: Yes  Active Problems:    Paravertebral phlegmon POA: Yes    Constipation POA: Unknown    Anemia POA: Yes    Intractable back pain POA: Yes    Elevated blood-pressure reading without diagnosis of hypertension POA: Yes  Resolved Problems:    * No resolved hospital problems. *      FOLLOW UP  Future Appointments   Date Time Provider Department Center   12/1/2022  1:20 PM Isabel Moreno M.D. [facility] None     [facility]  [address]  [place] [address]  [phone]        Vasile Terrell III, M.D.  [address]  [place] [id number] [phone]    Follow up in 1 week(s)  Please call your primary care provider to schedule, recent hospitalization follow up    Padilla Tinajero M.D.  [address]   [place] [id number] [phone]    Follow up in 2 day(s)  follow up recommended by specialist    Isabel Moreno M.D.  [address]  [address]  [place] [id number] [phone]    Follow up in 4 day(s)  follow up recommended by specialist      MEDICATIONS ON DISCHARGE     Medication List        START taking these medications        Instructions   amLODIPine 5 MG Tabs  Start taking on: October 27, 2022  Commonly known as: NORVASC   Take 1 Tablet by mouth every day for 30 days.  Dose: 5 mg     gabapentin 300 MG Caps  Commonly known as: NEURONTIN   Take 1 Capsule by mouth 3 times a day for 30 days.  Dose: 300 mg     lidocaine 5 % Ptch  Start  taking on: October 27, 2022  Commonly known as: LIDODERM  Replaces: Lidocaine 4 % Ptch   Place 1 Patch on the skin every 24 hours for 7 days.  Dose: 1 Patch     oxyCODONE immediate release 10 MG immediate release tablet  Commonly known as: ROXICODONE   Take 1 Tablet by mouth every 8 hours as needed for Severe Pain for up to 5 days.  Dose: 10 mg            CONTINUE taking these medications        Instructions   cyclobenzaprine 10 mg Tabs  Commonly known as: Flexeril   Take 1 Tablet by mouth 3 times a day as needed for Muscle Spasms.  Dose: 10 mg     ibuprofen 800 MG Tabs  Commonly known as: MOTRIN   Take 1 Tablet by mouth every 8 hours as needed for Moderate Pain.  Dose: 800 mg     methocarbamol 500 MG Tabs  Commonly known as: ROBAXIN   Take 1 to 2 tablets every 6 hours as needed for pain/muscle spasms.     methylPREDNISolone 4 MG Tbpk  Commonly known as: MEDROL DOSEPAK   Follow schedule on package instructions.            STOP taking these medications      Lidocaine 4 % Ptch  Replaced by: lidocaine 5 % Ptch     traMADol 50 MG Tabs  Commonly known as: Ultram              Allergies  No Known Allergies    DIET  Orders Placed This Encounter   Procedures    Diet Order Diet: Regular     Standing Status:   Standing     Number of Occurrences:   1     Order Specific Question:   Diet:     Answer:   Regular [1]       ACTIVITY  As tolerated.  Weight bearing as tolerated    CONSULTATIONS  ID, neurosurgery    PROCEDURES  I&D    LABORATORY  Lab Results   Component Value Date    SODIUM 135 10/26/2022    POTASSIUM 4.4 10/26/2022    CHLORIDE 98 10/26/2022    CO2 26 10/26/2022    GLUCOSE 114 (H) 10/26/2022    BUN 15 10/26/2022    CREATININE 0.50 10/26/2022        Lab Results   Component Value Date    WBC 5.5 10/23/2022    HEMOGLOBIN 12.0 (L) 10/23/2022    HEMATOCRIT 36.7 (L) 10/23/2022    PLATELETCT 458 (H) 10/23/2022        Total time of the discharge process exceeds 35 minutes.

## 2022-10-26 NOTE — THERAPY
"Occupational Therapy   Initial Evaluation     Patient Name: Yonis Ward  Age:  52 y.o., Sex:  male  Medical Record #: [id number]  Today's Date: 10/26/2022     Precautions  Precautions: Fall Risk    Assessment    Patient is 52 y.o. male admitted with increase back pain, left hip pain, found to have acute discitis osteomyelitis at L4-5. Pt had bx 10/21. Pt normally independent with functional mobility and ADLs living in a 2 story home will be [facility] to [facility]. Pt able to complete functional mobility and ADLs with supervision an duse of FWW. Pt instructed on adaptive strategies for lower body dressing to prevent further pain to lower back. No further OT needs, pt feels comfortable discharging home with family support, does not want home health therapy.     Plan    Recommend Occupational Therapy for Evaluation only.    DC Equipment Recommendations: (P) None  Discharge Recommendations: (P) Anticipate that the patient will have no further occupational therapy needs after discharge from the hospital     Subjective    \"The leg pain started while I was here\"     Objective       10/26/22 1401   Prior Living Situation   Prior Services None   Housing / Facility 1 Story House   Steps Into Home 2   Steps In Home 0   Bathroom Set up Walk In Shower;Shower Chair   Equipment Owned Front-Wheel Walker   Lives with - Patient's Self Care Capacity Parents   Prior Level of ADL Function   Self Feeding Independent   Grooming / Hygiene Independent   Bathing Independent   Dressing Independent   Toileting Independent   Prior Level of IADL Function   Medication Management Independent   Laundry Independent   Kitchen Mobility Independent   Finances Independent   Home Management Independent   Shopping Independent   Prior Level Of Mobility Independent Without Device in Community   Driving / Transportation Driving Independent   Occupation (Pre-Hospital Vocational) Employed Full Time   Precautions   Precautions Fall Risk   Pain 0 - 10 " Group   Therapist Pain Assessment Post Activity Pain Same as Prior to Activity;Nurse Notified   Cognition    Cognition / Consciousness WDL   Level of Consciousness Alert   Comments Impulsive, likely 2* to pain   Strength Upper Body   Upper Body Strength  WDL   Sensation Upper Body   Upper Extremity Sensation  WDL   Upper Body Muscle Tone   Upper Body Muscle Tone  WDL   Balance Assessment   Sitting Balance (Static) Fair   Sitting Balance (Dynamic) Fair   Standing Balance (Static) Fair   Standing Balance (Dynamic) Fair   Weight Shift Sitting Fair   Weight Shift Standing Fair   Comments no AD   Bed Mobility    Supine to Sit Supervised   Sit to Supine Supervised   Scooting Supervised   Rolling Supervised   ADL Assessment   Grooming Supervision   Upper Body Dressing Supervision   Lower Body Dressing Supervision   Toileting   (NT-refused need)   How much help from another person does the patient currently need...   Putting on and taking off regular lower body clothing? 3   Bathing (including washing, rinsing, and drying)? 3   Toileting, which includes using a toilet, bedpan, or urinal? 3   Putting on and taking off regular upper body clothing? 3   Taking care of personal grooming such as brushing teeth? 4   Eating meals? 4   6 Clicks Daily Activity Score 20   Functional Mobility   Sit to Stand Supervised   Bed, Chair, Wheelchair Transfer Standby Assist   Toilet Transfers Refused   Transfer Method Stand Step   Mobility bed mobility, in room mobility, back to bed   Comments w/ FWW   Activity Tolerance   Sitting Edge of Bed 5 min   Standing 5 min   Education Group   Education Provided Role of Occupational Therapist;Activities of Daily Living   Role of Occupational Therapist Patient Response Patient;Acceptance;Explanation   ADL Patient Response Patient;Acceptance;Explanation   Problem List   Problem List None   Anticipated Discharge Equipment and Recommendations   DC Equipment Recommendations None   Discharge Recommendations  Anticipate that the patient will have no further occupational therapy needs after discharge from the hospital   Interdisciplinary Plan of Care Collaboration   IDT Collaboration with  Nursing   Patient Position at End of Therapy In Bed;Bed Alarm On;Call Light within Reach;Tray Table within Reach;Phone within Reach   Collaboration Comments RN updated

## 2022-10-26 NOTE — THERAPY
"Physical Therapy   Daily Treatment     Patient Name: Yonis Ward  Age:  52 y.o., Sex:  male  Medical Record #: [id number]  Today's Date: 10/26/2022     Precautions  Precautions: Fall Risk    Assessment    Patient continues to be limited by pain. They mobilized in room with use of FWW for pain management but still demonstrated antalgic positioning in bed, sitting EOB, and while standing and ambulation. Patient reported no concerns regarding returning home following medical DC and reported anticipating being more comfortable at home. Will continue to follow while in house.    Plan    Continue current treatment plan.    DC Equipment Recommendations: None  Discharge Recommendations: Recommend outpatient physical therapy services to address higher level deficits (patient reported no concerns regarding returning home)      Subjective    \"The front of my L shin really hurts. And my leg and my butt and back.\"     Objective       10/26/22 1356   Charge Group   Charges  Yes   PT Therapeutic Activities 1  (16 min)   Precautions   Precautions Fall Risk   Vitals   O2 (LPM) 0   O2 Delivery Device None - Room Air   Pain 0 - 10 Group   Location Buttock;Leg;Back   Location Orientation Left   Therapist Pain Assessment During Activity;Nurse Notified   Cognition    Cognition / Consciousness WDL   Level of Consciousness Alert   Comments distracted by pain; antalgic positioning in bed and sitting EOB and fast moving with poor awareness of IV line   Balance   Sitting Balance (Static) Fair   Sitting Balance (Dynamic) Fair   Standing Balance (Static) Fair   Standing Balance (Dynamic) Fair   Weight Shift Sitting Fair   Weight Shift Standing Fair   Skilled Intervention Verbal Cuing;Compensatory Strategies   Comments FWW in standing; no overt LOB but antalgic positioning throughout and patient appears unsteady   Gait Analysis   Gait Level Of Assist Standby Assist   Assistive Device Front Wheel Walker   Distance (Feet) 30   # of Times " Distance was Traveled 1   Deviation Antalgic   # of Stairs Climbed 0   Weight Bearing Status no restrictions   Vision Deficits Impacting Mobility NT   Skilled Intervention Verbal Cuing   Bed Mobility    Supine to Sit Supervised   Sit to Supine Supervised   Scooting Supervised   Skilled Intervention Verbal Cuing;Compensatory Strategies   Comments compensatory technique due to pain   Functional Mobility   Sit to Stand Supervised   Bed, Chair, Wheelchair Transfer Supervised   Transfer Method Stand Step   Skilled Intervention Verbal Cuing   How much difficulty does the patient currently have...   Turning over in bed (including adjusting bedclothes, sheets and blankets)? 3   Sitting down on and standing up from a chair with arms (e.g., wheelchair, bedside commode, etc.) 3   Moving from lying on back to sitting on the side of the bed? 3   How much help from another person does the patient currently need...   Moving to and from a bed to a chair (including a wheelchair)? 3   Need to walk in a hospital room? 3   Climbing 3-5 steps with a railing? 3   6 clicks Mobility Score 18   Activity Tolerance   Sitting in Chair declined   Sitting Edge of Bed 5 min   Standing 5 min   Comments limited by pain   Short Term Goals    Short Term Goal # 1 pt will ambulate 150ft with FWW and SPV in 6 visits to access home   Goal Outcome # 1 goal not met   Short Term Goal # 2 pt will negotiate 2 steps with SPV in 6 visits to access home   Goal Outcome # 2 Goal not met   Anticipated Discharge Equipment and Recommendations   DC Equipment Recommendations None   Discharge Recommendations Recommend outpatient physical therapy services to address higher level deficits  (patient reported no concerns regarding returning home)   Interdisciplinary Plan of Care Collaboration   IDT Collaboration with  Nursing   Patient Position at End of Therapy In Bed;Bed Alarm On;Call Light within Reach;Tray Table within Reach;Phone within Reach   Collaboration Comments  RN aware of visit, response   Session Information   Date / Session Number  10/26-2 (2/3, 10/30)

## 2022-10-26 NOTE — PROGRESS NOTES
Alert and able to let his needs known. C/o pain and narco not being effective in his back/hip pain. Iv diuladid given and new oral pain meds obtained    Attempted to ambulate, 2 person assist with a very unsteady gait noted- unable to tolerate more than 50 feet before numbess to bilat legs and his back locking up; placed into a wheelchair.MRI completed during the evening- awaiting results

## 2022-11-01 ENCOUNTER — APPOINTMENT (OUTPATIENT)
Dept: RADIOLOGY | Facility: MEDICAL CENTER | Age: 52
End: 2022-11-01
Payer: COMMERCIAL

## 2022-11-01 ENCOUNTER — HOSPITAL ENCOUNTER (OUTPATIENT)
Facility: MEDICAL CENTER | Age: 52
End: 2022-11-02
Attending: EMERGENCY MEDICINE | Admitting: STUDENT IN AN ORGANIZED HEALTH CARE EDUCATION/TRAINING PROGRAM
Payer: COMMERCIAL

## 2022-11-01 ENCOUNTER — OFFICE VISIT (OUTPATIENT)
Dept: URGENT CARE | Facility: PHYSICIAN GROUP | Age: 52
End: 2022-11-01
Payer: COMMERCIAL

## 2022-11-01 ENCOUNTER — APPOINTMENT (OUTPATIENT)
Dept: RADIOLOGY | Facility: MEDICAL CENTER | Age: 52
End: 2022-11-01
Attending: EMERGENCY MEDICINE
Payer: COMMERCIAL

## 2022-11-01 ENCOUNTER — APPOINTMENT (OUTPATIENT)
Dept: RADIOLOGY | Facility: MEDICAL CENTER | Age: 52
End: 2022-11-01
Attending: STUDENT IN AN ORGANIZED HEALTH CARE EDUCATION/TRAINING PROGRAM
Payer: COMMERCIAL

## 2022-11-01 VITALS
RESPIRATION RATE: 16 BRPM | HEART RATE: 120 BPM | WEIGHT: 120 LBS | HEIGHT: 66 IN | BODY MASS INDEX: 19.29 KG/M2 | SYSTOLIC BLOOD PRESSURE: 130 MMHG | OXYGEN SATURATION: 98 % | TEMPERATURE: 98.6 F | DIASTOLIC BLOOD PRESSURE: 80 MMHG

## 2022-11-01 DIAGNOSIS — M62.830 BACK SPASM: ICD-10-CM

## 2022-11-01 DIAGNOSIS — M79.605 LEFT LEG PAIN: ICD-10-CM

## 2022-11-01 DIAGNOSIS — M46.26 OSTEOMYELITIS OF LUMBAR SPINE (HCC): ICD-10-CM

## 2022-11-01 DIAGNOSIS — M54.17 LUMBOSACRAL RADICULOPATHY: ICD-10-CM

## 2022-11-01 DIAGNOSIS — M46.20 OSTEOMYELITIS OF VERTEBRA (HCC): ICD-10-CM

## 2022-11-01 DIAGNOSIS — R20.9 SENSATION OF COLD IN LOWER EXTREMITY: ICD-10-CM

## 2022-11-01 LAB
ALBUMIN SERPL BCP-MCNC: 4.5 G/DL (ref 3.2–4.9)
ALBUMIN/GLOB SERPL: 1.1 G/DL
ALP SERPL-CCNC: 97 U/L (ref 30–99)
ALT SERPL-CCNC: 14 U/L (ref 2–50)
ANION GAP SERPL CALC-SCNC: 12 MMOL/L (ref 7–16)
AST SERPL-CCNC: 15 U/L (ref 12–45)
BASOPHILS # BLD AUTO: 0.5 % (ref 0–1.8)
BASOPHILS # BLD: 0.03 K/UL (ref 0–0.12)
BILIRUB SERPL-MCNC: 0.3 MG/DL (ref 0.1–1.5)
BUN SERPL-MCNC: 22 MG/DL (ref 8–22)
CALCIUM SERPL-MCNC: 9.7 MG/DL (ref 8.5–10.5)
CHLORIDE SERPL-SCNC: 101 MMOL/L (ref 96–112)
CO2 SERPL-SCNC: 27 MMOL/L (ref 20–33)
CREAT SERPL-MCNC: 0.71 MG/DL (ref 0.5–1.4)
CRP SERPL HS-MCNC: 0.62 MG/DL (ref 0–0.75)
EOSINOPHIL # BLD AUTO: 0.09 K/UL (ref 0–0.51)
EOSINOPHIL NFR BLD: 1.6 % (ref 0–6.9)
ERYTHROCYTE [DISTWIDTH] IN BLOOD BY AUTOMATED COUNT: 44 FL (ref 35.9–50)
ERYTHROCYTE [SEDIMENTATION RATE] IN BLOOD BY WESTERGREN METHOD: 18 MM/HOUR (ref 0–20)
GFR SERPLBLD CREATININE-BSD FMLA CKD-EPI: 111 ML/MIN/1.73 M 2
GLOBULIN SER CALC-MCNC: 4 G/DL (ref 1.9–3.5)
GLUCOSE SERPL-MCNC: 99 MG/DL (ref 65–99)
HCT VFR BLD AUTO: 41.9 % (ref 42–52)
HGB BLD-MCNC: 13.9 G/DL (ref 14–18)
IMM GRANULOCYTES # BLD AUTO: 0.02 K/UL (ref 0–0.11)
IMM GRANULOCYTES NFR BLD AUTO: 0.3 % (ref 0–0.9)
LACTATE SERPL-SCNC: 1.8 MMOL/L (ref 0.5–2)
LYMPHOCYTES # BLD AUTO: 1.76 K/UL (ref 1–4.8)
LYMPHOCYTES NFR BLD: 30.6 % (ref 22–41)
MAGNESIUM SERPL-MCNC: 2.2 MG/DL (ref 1.5–2.5)
MCH RBC QN AUTO: 29 PG (ref 27–33)
MCHC RBC AUTO-ENTMCNC: 33.2 G/DL (ref 33.7–35.3)
MCV RBC AUTO: 87.3 FL (ref 81.4–97.8)
MONOCYTES # BLD AUTO: 0.51 K/UL (ref 0–0.85)
MONOCYTES NFR BLD AUTO: 8.9 % (ref 0–13.4)
NEUTROPHILS # BLD AUTO: 3.35 K/UL (ref 1.82–7.42)
NEUTROPHILS NFR BLD: 58.1 % (ref 44–72)
NRBC # BLD AUTO: 0 K/UL
NRBC BLD-RTO: 0 /100 WBC
PHOSPHATE SERPL-MCNC: 3.4 MG/DL (ref 2.5–4.5)
PLATELET # BLD AUTO: 456 K/UL (ref 164–446)
PMV BLD AUTO: 8.3 FL (ref 9–12.9)
POTASSIUM SERPL-SCNC: 3.6 MMOL/L (ref 3.6–5.5)
PROT SERPL-MCNC: 8.5 G/DL (ref 6–8.2)
RBC # BLD AUTO: 4.8 M/UL (ref 4.7–6.1)
SODIUM SERPL-SCNC: 140 MMOL/L (ref 135–145)
WBC # BLD AUTO: 5.8 K/UL (ref 4.8–10.8)

## 2022-11-01 PROCEDURE — 700105 HCHG RX REV CODE 258: Performed by: EMERGENCY MEDICINE

## 2022-11-01 PROCEDURE — 700102 HCHG RX REV CODE 250 W/ 637 OVERRIDE(OP): Performed by: STUDENT IN AN ORGANIZED HEALTH CARE EDUCATION/TRAINING PROGRAM

## 2022-11-01 PROCEDURE — G0378 HOSPITAL OBSERVATION PER HR: HCPCS

## 2022-11-01 PROCEDURE — 99285 EMERGENCY DEPT VISIT HI MDM: CPT

## 2022-11-01 PROCEDURE — 72158 MRI LUMBAR SPINE W/O & W/DYE: CPT

## 2022-11-01 PROCEDURE — 86140 C-REACTIVE PROTEIN: CPT

## 2022-11-01 PROCEDURE — 700117 HCHG RX CONTRAST REV CODE 255: Performed by: STUDENT IN AN ORGANIZED HEALTH CARE EDUCATION/TRAINING PROGRAM

## 2022-11-01 PROCEDURE — 87040 BLOOD CULTURE FOR BACTERIA: CPT

## 2022-11-01 PROCEDURE — 85652 RBC SED RATE AUTOMATED: CPT

## 2022-11-01 PROCEDURE — 36415 COLL VENOUS BLD VENIPUNCTURE: CPT

## 2022-11-01 PROCEDURE — 84100 ASSAY OF PHOSPHORUS: CPT

## 2022-11-01 PROCEDURE — 99214 OFFICE O/P EST MOD 30 MIN: CPT | Performed by: NURSE PRACTITIONER

## 2022-11-01 PROCEDURE — 700111 HCHG RX REV CODE 636 W/ 250 OVERRIDE (IP): Performed by: EMERGENCY MEDICINE

## 2022-11-01 PROCEDURE — 83735 ASSAY OF MAGNESIUM: CPT

## 2022-11-01 PROCEDURE — 80053 COMPREHEN METABOLIC PANEL: CPT

## 2022-11-01 PROCEDURE — 96375 TX/PRO/DX INJ NEW DRUG ADDON: CPT

## 2022-11-01 PROCEDURE — 71045 X-RAY EXAM CHEST 1 VIEW: CPT

## 2022-11-01 PROCEDURE — 99220 PR INITIAL OBSERVATION CARE,LEVL III: CPT | Performed by: STUDENT IN AN ORGANIZED HEALTH CARE EDUCATION/TRAINING PROGRAM

## 2022-11-01 PROCEDURE — 83605 ASSAY OF LACTIC ACID: CPT

## 2022-11-01 PROCEDURE — A9270 NON-COVERED ITEM OR SERVICE: HCPCS | Performed by: STUDENT IN AN ORGANIZED HEALTH CARE EDUCATION/TRAINING PROGRAM

## 2022-11-01 PROCEDURE — 85025 COMPLETE CBC W/AUTO DIFF WBC: CPT

## 2022-11-01 PROCEDURE — A9576 INJ PROHANCE MULTIPACK: HCPCS | Performed by: STUDENT IN AN ORGANIZED HEALTH CARE EDUCATION/TRAINING PROGRAM

## 2022-11-01 PROCEDURE — 96365 THER/PROPH/DIAG IV INF INIT: CPT

## 2022-11-01 RX ORDER — ACETAMINOPHEN 500 MG
1000 TABLET ORAL EVERY 6 HOURS
Status: DISCONTINUED | OUTPATIENT
Start: 2022-11-01 | End: 2022-11-02 | Stop reason: HOSPADM

## 2022-11-01 RX ORDER — AMLODIPINE BESYLATE 5 MG/1
5 TABLET ORAL DAILY
Status: DISCONTINUED | OUTPATIENT
Start: 2022-11-02 | End: 2022-11-02 | Stop reason: HOSPADM

## 2022-11-01 RX ORDER — AMOXICILLIN 250 MG
2 CAPSULE ORAL 2 TIMES DAILY
Status: DISCONTINUED | OUTPATIENT
Start: 2022-11-01 | End: 2022-11-02 | Stop reason: HOSPADM

## 2022-11-01 RX ORDER — KETOROLAC TROMETHAMINE 30 MG/ML
15 INJECTION, SOLUTION INTRAMUSCULAR; INTRAVENOUS ONCE
Status: COMPLETED | OUTPATIENT
Start: 2022-11-01 | End: 2022-11-01

## 2022-11-01 RX ORDER — MORPHINE SULFATE 15 MG/1
15 TABLET, FILM COATED, EXTENDED RELEASE ORAL EVERY 12 HOURS
Status: DISCONTINUED | OUTPATIENT
Start: 2022-11-01 | End: 2022-11-02 | Stop reason: HOSPADM

## 2022-11-01 RX ORDER — KETOROLAC TROMETHAMINE 30 MG/ML
15 INJECTION, SOLUTION INTRAMUSCULAR; INTRAVENOUS EVERY 6 HOURS PRN
Status: DISCONTINUED | OUTPATIENT
Start: 2022-11-01 | End: 2022-11-02 | Stop reason: HOSPADM

## 2022-11-01 RX ORDER — METHOCARBAMOL 500 MG/1
500 TABLET, FILM COATED ORAL 4 TIMES DAILY PRN
Status: DISCONTINUED | OUTPATIENT
Start: 2022-11-01 | End: 2022-11-02 | Stop reason: HOSPADM

## 2022-11-01 RX ORDER — ONDANSETRON 4 MG/1
4 TABLET, ORALLY DISINTEGRATING ORAL EVERY 4 HOURS PRN
Status: DISCONTINUED | OUTPATIENT
Start: 2022-11-01 | End: 2022-11-02 | Stop reason: HOSPADM

## 2022-11-01 RX ORDER — HYDROMORPHONE HYDROCHLORIDE 1 MG/ML
1 INJECTION, SOLUTION INTRAMUSCULAR; INTRAVENOUS; SUBCUTANEOUS ONCE
Status: COMPLETED | OUTPATIENT
Start: 2022-11-01 | End: 2022-11-01

## 2022-11-01 RX ORDER — LABETALOL HYDROCHLORIDE 5 MG/ML
10 INJECTION, SOLUTION INTRAVENOUS EVERY 4 HOURS PRN
Status: DISCONTINUED | OUTPATIENT
Start: 2022-11-01 | End: 2022-11-02 | Stop reason: HOSPADM

## 2022-11-01 RX ORDER — POLYETHYLENE GLYCOL 3350 17 G/17G
1 POWDER, FOR SOLUTION ORAL
Status: DISCONTINUED | OUTPATIENT
Start: 2022-11-01 | End: 2022-11-02 | Stop reason: HOSPADM

## 2022-11-01 RX ORDER — ONDANSETRON 2 MG/ML
4 INJECTION INTRAMUSCULAR; INTRAVENOUS EVERY 4 HOURS PRN
Status: DISCONTINUED | OUTPATIENT
Start: 2022-11-01 | End: 2022-11-02 | Stop reason: HOSPADM

## 2022-11-01 RX ORDER — PROMETHAZINE HYDROCHLORIDE 25 MG/1
12.5-25 SUPPOSITORY RECTAL EVERY 4 HOURS PRN
Status: DISCONTINUED | OUTPATIENT
Start: 2022-11-01 | End: 2022-11-02 | Stop reason: HOSPADM

## 2022-11-01 RX ORDER — GABAPENTIN 300 MG/1
300 CAPSULE ORAL 3 TIMES DAILY
Status: DISCONTINUED | OUTPATIENT
Start: 2022-11-01 | End: 2022-11-02 | Stop reason: HOSPADM

## 2022-11-01 RX ORDER — HYDROMORPHONE HYDROCHLORIDE 1 MG/ML
1 INJECTION, SOLUTION INTRAMUSCULAR; INTRAVENOUS; SUBCUTANEOUS EVERY 4 HOURS PRN
Status: DISCONTINUED | OUTPATIENT
Start: 2022-11-01 | End: 2022-11-01

## 2022-11-01 RX ORDER — PROCHLORPERAZINE EDISYLATE 5 MG/ML
5-10 INJECTION INTRAMUSCULAR; INTRAVENOUS EVERY 4 HOURS PRN
Status: DISCONTINUED | OUTPATIENT
Start: 2022-11-01 | End: 2022-11-02 | Stop reason: HOSPADM

## 2022-11-01 RX ORDER — BISACODYL 10 MG
10 SUPPOSITORY, RECTAL RECTAL
Status: DISCONTINUED | OUTPATIENT
Start: 2022-11-01 | End: 2022-11-02 | Stop reason: HOSPADM

## 2022-11-01 RX ORDER — PROMETHAZINE HYDROCHLORIDE 25 MG/1
12.5-25 TABLET ORAL EVERY 4 HOURS PRN
Status: DISCONTINUED | OUTPATIENT
Start: 2022-11-01 | End: 2022-11-02 | Stop reason: HOSPADM

## 2022-11-01 RX ADMIN — MORPHINE SULFATE 15 MG: 15 TABLET, FILM COATED, EXTENDED RELEASE ORAL at 20:54

## 2022-11-01 RX ADMIN — ACETAMINOPHEN 1000 MG: 500 TABLET ORAL at 20:54

## 2022-11-01 RX ADMIN — HYDROMORPHONE HYDROCHLORIDE 1 MG: 1 INJECTION, SOLUTION INTRAMUSCULAR; INTRAVENOUS; SUBCUTANEOUS at 15:47

## 2022-11-01 RX ADMIN — GABAPENTIN 300 MG: 300 CAPSULE ORAL at 20:54

## 2022-11-01 RX ADMIN — GADOTERIDOL 10 ML: 279.3 INJECTION, SOLUTION INTRAVENOUS at 19:46

## 2022-11-01 RX ADMIN — KETOROLAC TROMETHAMINE 15 MG: 30 INJECTION, SOLUTION INTRAMUSCULAR; INTRAVENOUS at 15:46

## 2022-11-01 RX ADMIN — DAPTOMYCIN 420 MG: 500 INJECTION, POWDER, LYOPHILIZED, FOR SOLUTION INTRAVENOUS at 19:08

## 2022-11-01 ASSESSMENT — LIFESTYLE VARIABLES
HOW MANY TIMES IN THE PAST YEAR HAVE YOU HAD 5 OR MORE DRINKS IN A DAY: 0
EVER FELT BAD OR GUILTY ABOUT YOUR DRINKING: NO
DOES PATIENT WANT TO STOP DRINKING: NO
HAVE PEOPLE ANNOYED YOU BY CRITICIZING YOUR DRINKING: NO
CONSUMPTION TOTAL: NEGATIVE
TOTAL SCORE: 0
SUBSTANCE_ABUSE: 0
ALCOHOL_USE: NO
AVERAGE NUMBER OF DAYS PER WEEK YOU HAVE A DRINK CONTAINING ALCOHOL: 0
TOTAL SCORE: 0
TOTAL SCORE: 0
ON A TYPICAL DAY WHEN YOU DRINK ALCOHOL HOW MANY DRINKS DO YOU HAVE: 0
EVER HAD A DRINK FIRST THING IN THE MORNING TO STEADY YOUR NERVES TO GET RID OF A HANGOVER: NO
HAVE YOU EVER FELT YOU SHOULD CUT DOWN ON YOUR DRINKING: NO

## 2022-11-01 ASSESSMENT — PATIENT HEALTH QUESTIONNAIRE - PHQ9
SUM OF ALL RESPONSES TO PHQ9 QUESTIONS 1 AND 2: 0
2. FEELING DOWN, DEPRESSED, IRRITABLE, OR HOPELESS: NOT AT ALL
1. LITTLE INTEREST OR PLEASURE IN DOING THINGS: NOT AT ALL

## 2022-11-01 ASSESSMENT — ENCOUNTER SYMPTOMS
BLURRED VISION: 0
NERVOUS/ANXIOUS: 0
PALPITATIONS: 0
BACK PAIN: 1
FOCAL WEAKNESS: 0
FEVER: 0
DOUBLE VISION: 0
ABDOMINAL PAIN: 0
CHILLS: 0
SENSORY CHANGE: 0
MYALGIAS: 1
NAUSEA: 0
COUGH: 0
SPEECH CHANGE: 0
DIARRHEA: 0
VOMITING: 0
SHORTNESS OF BREATH: 0

## 2022-11-01 ASSESSMENT — PAIN DESCRIPTION - PAIN TYPE
TYPE: ACUTE PAIN
TYPE: ACUTE PAIN

## 2022-11-01 ASSESSMENT — FIBROSIS 4 INDEX
FIB4 SCORE: 0.36
FIB4 SCORE: 0.44
FIB4 SCORE: 0.36

## 2022-11-01 NOTE — ED NOTES
Pt reports Left hip pain 2/10 after receiving pain medication. Resting comfortably at this time.  Pt aware of ordered UA, unable to void at this time.

## 2022-11-01 NOTE — ED NOTES
Med rec updated and complete. Allergies reviewed. Pt denies antibiotic use in last 30 days.      Last fill RENOWN PERLITA [phone]    HOME PHARMACY [facility] [phone]

## 2022-11-01 NOTE — ED PROVIDER NOTES
ED Provider Note    This patient was cared for during the COVID-19 pandemic. History and physical exam may be limited/truncated by the inherent challenges of PPE and the need to decrease staff exposure to novel coronavirus. Some aspects of disease management may be different to protect staff and help slow the spread of disease. I verified that, if possible, the patient was wearing a mask and I was wearing appropriate PPE every time I encountered the patient.       CHIEF COMPLAINT  Chief Complaint   Patient presents with    Leg Pain     L sided, pt had abscess of sacral spine ongoing x2 months. Leg pain started approximately 1 week prior with intermittent numbness/tingling.  Pt has been compliant with oral abx at home.        HPI  Yonis Ward is a 52 y.o. male who presents for evaluation of left lower leg pain.  Patient recently was admitted for an epidural osteomyelitis and discharged about a week ago.  He was discharged with 5 days of oxycodone and that seemed to be helping initially however over the last few days he has had increasing pain.  Sometimes if he sleeps in a certain position the pain does go away but any ambulation even just around the house is very difficult secondary to pain.  The pain is about the same as when he was in the hospital previously he denies any worsening neurologic symptoms.  He reported some cool type of sensation to his left lower extremity.  He actually in general has been feeling better his back pain has been improving.    REVIEW OF SYSTEMS  positive for left leg pain, negative for fevers. All other systems are negative.     PAST MEDICAL HISTORY       SOCIAL HISTORY  Social History     Tobacco Use    Smoking status: Former     Types: Cigarettes     Quit date: 2018     Years since quittin.1    Smokeless tobacco: Never   Vaping Use    Vaping Use: Never used   Substance and Sexual Activity    Alcohol use: Not Currently     Comment: occ    Drug use: Never    Sexual  "activity: Not Currently       SURGICAL HISTORY  patient denies any surgical history    CURRENT MEDICATIONS  Home Medications       Reviewed by Elijah Hilario (Pharmacy Tech) on 11/01/22 at 1618  Med List Status: Complete     Medication Last Dose Status   amLODIPine (NORVASC) 5 MG Tab 11/1/2022 Active   gabapentin (NEURONTIN) 300 MG Cap 11/1/2022 Active   ibuprofen (MOTRIN) 800 MG Tab 10/31/2022 Active   lidocaine (LIDODERM) 5 % Patch 10/31/2022 Active   methocarbamol (ROBAXIN) 500 MG Tab > 1 week Active                    ALLERGIES  No Known Allergies    PHYSICAL EXAM  VITAL SIGNS: BP (!) 136/90   Pulse 80   Temp 36.7 °C (98 °F) (Temporal)   Resp 20   Ht 1.676 m (5' 6\")   Wt 52.6 kg (115 lb 15.4 oz)   SpO2 97%   BMI 18.72 kg/m² .  Constitutional: Alert in no apparent distress.  HENT: No signs of trauma, Bilateral external ears normal, Nose normal.   Eyes: Pupils are equal and reactive, Conjunctiva normal, Non-icteric.   Neck: Normal range of motion, No tenderness, Supple, No stridor.   Cardiovascular: Regular rate and rhythm, no murmurs.   Thorax & Lungs: Normal breath sounds, No respiratory distress, No wheezing, No chest tenderness.   Abdomen: Bowel sounds normal, Soft, No tenderness, No masses, No peritoneal signs.  Skin: Warm, Dry, No erythema, No rash.   Musculoskeletal:  no major deformities noted.  Good pulses bilaterally no lower extremity swelling  Neurologic: Alert,  No focal deficits noted.   Psychiatric: Affect normal, Judgment normal, Mood normal.       DIAGNOSTIC STUDIES / PROCEDURES      EKG  No results found for this or any previous visit.      LABS  Labs Reviewed   CBC WITH DIFFERENTIAL - Abnormal; Notable for the following components:       Result Value    Hemoglobin 13.9 (*)     Hematocrit 41.9 (*)     MCHC 33.2 (*)     Platelet Count 456 (*)     MPV 8.3 (*)     All other components within normal limits   COMP METABOLIC PANEL - Abnormal; Notable for the following components:    Total " "Protein 8.5 (*)     Globulin 4.0 (*)     All other components within normal limits   LACTIC ACID   ESTIMATED GFR   SED RATE   CRP QUANTITIVE (NON-CARDIAC)   LACTIC ACID   LACTIC ACID   URINALYSIS   URINE CULTURE(NEW)   BLOOD CULTURE    Narrative:     Per Hospital Policy: Only change Specimen Src: to \"Line\" if  specified by physician order.   BLOOD CULTURE    Narrative:     Per Hospital Policy: Only change Specimen Src: to \"Line\" if  specified by physician order.   MAGNESIUM   PHOSPHORUS       RADIOLOGY  DX-CHEST-PORTABLE (1 VIEW)   Final Result      1.  No acute cardiopulmonary abnormality.   2.  Right PICC tip is in the superior cavoatrial junction.      MR-LUMBAR SPINE-WITH & W/O    (Results Pending)       Medical records reviewed for continuity of care.  Patient was hospitalized last month for low back pain.  He was found to have discitis osteomyelitis at L4-L5.  There is a prevertebral phlegmon.  ID followed he was treated with vancomycin and transition to daptomycin to complete a 6-week course via PICC line.  At time of discharge he was complaining of more leg pain and numbness they had a repeat MRI at that time which did not show any significant changes.  No further intervention was deemed necessary per neurosurgery.    Differential Diagnosis includes but is not limited to: Lack of pain control, worsening phlegmon/abscess    COURSE & MEDICAL DECISION MAKING  Pertinent Labs & Imaging studies reviewed. (See chart for details)    This is a 52-year-old gentleman with a recent history of osteomyelitis at L4/L5 with a prevertebral phlegmon.  He is on daptomycin at this time.  He presents with worsening/not improving leg pain.  He has no significant weakness however this pain is been debilitating and he is unable to appropriately care for himself secondary to pain.  I am concerned for worsening abscess.  Labs are obtained to evaluate for possible sepsis given his initial elevated heart rate.    5:48 PM  Patient is " resting comfortably in bed.  His CRP is normal white count is normal.  However patient has really been unable to manage himself at home secondary to pain.  I think it is reasonable to bring him into the hospital for the evening for establishment of a pain control regimen and repeat MRI to make sure that the infection is not worsening.  Patient is agreeable to this plan.    I spoke with Dr. Rasmussen, hospitalist who is agreeable to consult for hospitalization.  Patient will be hospitalized in guarded condition.      FINAL IMPRESSION  1. Left leg pain        2. Osteomyelitis of lumbar spine (HCC)            2.   3.    This dictation has been creating using voice recognition software. The accuracy of the dictation is limited the abilities of the software.  I expect there may be some errors of grammar and possibly content. I made every attempt to manually correct the errors within my dictation. However errors related to this voice recognition software may still exist and should be interpreted within the appropriate context.      The note accurately reflects work and decisions made by me.  Kayley Singer M.D.  11/1/2022  7:37 PM

## 2022-11-01 NOTE — ED TRIAGE NOTES
"Chief Complaint   Patient presents with    Leg Pain     L sided, pt had abscess of sacral spine ongoing x2 months. Leg pain started approximately 1 week prior with intermittent numbness/tingling.  Pt has been compliant with oral abx at home.    'BP (!) 147/103   Pulse (!) 130   Temp 36.7 °C (98 °F) (Temporal)   Resp 20   Ht 1.676 m (5' 6\")   Wt 52.6 kg (115 lb 15.4 oz)   SpO2 97%   BMI 18.72 kg/m²     Pt ambulatory to triage for above, protocol ordered.   "

## 2022-11-01 NOTE — PROGRESS NOTES
Patient has consented to treatment and for use of patient information for treatment and billing purposes.    Date: 11/01/22     Arrival Mode: Private Vehicle    Chief Complaint:    Chief Complaint   Patient presents with    Leg Pain     (L) leg from the hip down to his foot x 1 week        History of Present Illness: 52 y.o.  male presents to clinic with pain that starts in his left hip and radiates down to his foot.  Patient states that he feels a coldness in his left foot as well.  Patient was recently admitted for osteomyelitis with epidural abscess.  Patient states he did receive IV antibiotics and is currently still taking antibiotics.  Patient states that the general back pain has improved although this pain to his left hip and coldness is worsening.  Denies any recent fevers or body aches.  Denies shortness of breath chest pain or lower leg swelling.     Patient does admit to unintentional weight loss approximately 30 pounds over the past 2 months.  Patient denies IV drug use trauma to the spine or loss of bowel or bladder control.  No history of cancer.        ROS:    As stated in HPI     Pertinent Medical History:  History reviewed. No pertinent past medical history.     Pertinent Surgical History:  History reviewed. No pertinent surgical history.     Pertinent Medications:    Current Outpatient Medications on File Prior to Visit   Medication Sig Dispense Refill    amLODIPine (NORVASC) 5 MG Tab Take 1 Tablet by mouth every day for 30 days. 30 Tablet 0    gabapentin (NEURONTIN) 300 MG Cap Take 1 Capsule by mouth 3 times a day for 30 days. 90 Capsule 0    lidocaine (LIDODERM) 5 % Patch Place 1 Patch on the skin every 24 hours for 7 days. 7 Patch 0    cyclobenzaprine (FLEXERIL) 10 mg Tab Take 1 Tablet by mouth 3 times a day as needed for Muscle Spasms. 30 Tablet 0    ibuprofen (MOTRIN) 800 MG Tab Take 1 Tablet by mouth every 8 hours as needed for Moderate Pain. 30 Tablet 0    methocarbamol (ROBAXIN) 500 MG Tab  Take 1 to 2 tablets every 6 hours as needed for pain/muscle spasms. 90 Tablet 0    methylPREDNISolone (MEDROL DOSEPAK) 4 MG Tablet Therapy Pack Follow schedule on package instructions. (Patient not taking: Reported on 2022) 21 Tablet 0     No current facility-administered medications on file prior to visit.        Allergies:    Patient has no known allergies.     Social History:  Social History     Tobacco Use    Smoking status: Former     Types: Cigarettes     Quit date: 2018     Years since quittin.1    Smokeless tobacco: Never   Vaping Use    Vaping Use: Never used   Substance Use Topics    Alcohol use: Not Currently     Comment: occ    Drug use: Never        No LMP for male patient.           Physical Exam:    Vitals:    22 1259   BP: 130/80   Pulse: (!) 120   Resp: 16   Temp: 37 °C (98.6 °F)   SpO2: 98%             Physical Exam  Constitutional:       General: He is not in acute distress.     Appearance: Normal appearance. He is underweight. He is ill-appearing. He is not toxic-appearing.   Cardiovascular:      Rate and Rhythm: Normal rate and regular rhythm.      Heart sounds: Normal heart sounds.   Musculoskeletal:      Lumbar back: Positive left straight leg raise test (With left straight leg raise radiation is to the low back.  No radiation to the groin.).      Right foot: Decreased capillary refill. Abnormal pulse (difficult to palpate, faint).      Left foot: Decreased capillary refill. Abnormal pulse (difficult to palpate, faint).      Comments: Left ankle is sightly cooler than right.    Skin:     General: Skin is warm.      Coloration: Skin is not cyanotic or pale.      Findings: No rash.   Neurological:      Mental Status: He is alert and oriented to person, place, and time.      Gait: Gait is intact.   Psychiatric:         Behavior: Behavior normal. Behavior is cooperative.           Diagnostics:     None     Medical Decision making and clinic course :  I personally reviewed prior  external notes and test results pertinent to today's visit.  Did review previous imaging laboratory results and multiple notes.  Shared decision-making was utilized with patient for treatment plan.  Due to the complexity of patient's condition and focal neurological deficits and the sensation of cold extremity do feel patient needs to seek higher level care for evaluation.  Patient also presents to clinic with tachycardia likely related to pain.    The patient remained stable during the urgent care visit.    Plan:        1. Osteomyelitis of vertebra, epidural abscess (HCC)      2. Sensation of cold in lower extremity    3. Lumbosacral radiculopathy         Disposition:  Dry level care via private car driven by mother.      Voice Recognition Disclaimer:  Portions of this document were created using voice recognition software. The software does have a chance of producing errors of grammar and possibly content. I have made every reasonable attempt to correct obvious errors, but there may be errors of grammar and possibly content that I did not discover before finalizing the documentation.    Laverne Dodge, EMILE.

## 2022-11-02 VITALS
BODY MASS INDEX: 19.56 KG/M2 | RESPIRATION RATE: 16 BRPM | HEIGHT: 66 IN | WEIGHT: 121.69 LBS | OXYGEN SATURATION: 97 % | SYSTOLIC BLOOD PRESSURE: 157 MMHG | DIASTOLIC BLOOD PRESSURE: 99 MMHG | TEMPERATURE: 98 F | HEART RATE: 95 BPM

## 2022-11-02 LAB
ANION GAP SERPL CALC-SCNC: 10 MMOL/L (ref 7–16)
APPEARANCE UR: CLEAR
BASOPHILS # BLD AUTO: 0.7 % (ref 0–1.8)
BASOPHILS # BLD: 0.04 K/UL (ref 0–0.12)
BILIRUB UR QL STRIP.AUTO: NEGATIVE
BUN SERPL-MCNC: 23 MG/DL (ref 8–22)
CALCIUM SERPL-MCNC: 9.1 MG/DL (ref 8.5–10.5)
CHLORIDE SERPL-SCNC: 98 MMOL/L (ref 96–112)
CO2 SERPL-SCNC: 27 MMOL/L (ref 20–33)
COLOR UR: YELLOW
CREAT SERPL-MCNC: 0.73 MG/DL (ref 0.5–1.4)
EOSINOPHIL # BLD AUTO: 0.11 K/UL (ref 0–0.51)
EOSINOPHIL NFR BLD: 1.8 % (ref 0–6.9)
ERYTHROCYTE [DISTWIDTH] IN BLOOD BY AUTOMATED COUNT: 45.4 FL (ref 35.9–50)
GFR SERPLBLD CREATININE-BSD FMLA CKD-EPI: 110 ML/MIN/1.73 M 2
GLUCOSE SERPL-MCNC: 108 MG/DL (ref 65–99)
GLUCOSE UR STRIP.AUTO-MCNC: NEGATIVE MG/DL
HCT VFR BLD AUTO: 39.1 % (ref 42–52)
HGB BLD-MCNC: 12.5 G/DL (ref 14–18)
IMM GRANULOCYTES # BLD AUTO: 0.03 K/UL (ref 0–0.11)
IMM GRANULOCYTES NFR BLD AUTO: 0.5 % (ref 0–0.9)
KETONES UR STRIP.AUTO-MCNC: NEGATIVE MG/DL
LACTATE SERPL-SCNC: 1.3 MMOL/L (ref 0.5–2)
LEUKOCYTE ESTERASE UR QL STRIP.AUTO: NEGATIVE
LYMPHOCYTES # BLD AUTO: 1.99 K/UL (ref 1–4.8)
LYMPHOCYTES NFR BLD: 33.2 % (ref 22–41)
MCH RBC QN AUTO: 28.9 PG (ref 27–33)
MCHC RBC AUTO-ENTMCNC: 32 G/DL (ref 33.7–35.3)
MCV RBC AUTO: 90.3 FL (ref 81.4–97.8)
MICRO URNS: NORMAL
MONOCYTES # BLD AUTO: 0.58 K/UL (ref 0–0.85)
MONOCYTES NFR BLD AUTO: 9.7 % (ref 0–13.4)
NEUTROPHILS # BLD AUTO: 3.24 K/UL (ref 1.82–7.42)
NEUTROPHILS NFR BLD: 54.1 % (ref 44–72)
NITRITE UR QL STRIP.AUTO: NEGATIVE
NRBC # BLD AUTO: 0 K/UL
NRBC BLD-RTO: 0 /100 WBC
PH UR STRIP.AUTO: 5.5 [PH] (ref 5–8)
PLATELET # BLD AUTO: 345 K/UL (ref 164–446)
PMV BLD AUTO: 8.2 FL (ref 9–12.9)
POTASSIUM SERPL-SCNC: 4 MMOL/L (ref 3.6–5.5)
PROT UR QL STRIP: NEGATIVE MG/DL
RBC # BLD AUTO: 4.33 M/UL (ref 4.7–6.1)
RBC UR QL AUTO: NEGATIVE
SODIUM SERPL-SCNC: 135 MMOL/L (ref 135–145)
SP GR UR STRIP.AUTO: 1.02
UROBILINOGEN UR STRIP.AUTO-MCNC: 0.2 MG/DL
WBC # BLD AUTO: 6 K/UL (ref 4.8–10.8)

## 2022-11-02 PROCEDURE — G0378 HOSPITAL OBSERVATION PER HR: HCPCS

## 2022-11-02 PROCEDURE — 97165 OT EVAL LOW COMPLEX 30 MIN: CPT

## 2022-11-02 PROCEDURE — A9270 NON-COVERED ITEM OR SERVICE: HCPCS | Performed by: STUDENT IN AN ORGANIZED HEALTH CARE EDUCATION/TRAINING PROGRAM

## 2022-11-02 PROCEDURE — 81003 URINALYSIS AUTO W/O SCOPE: CPT

## 2022-11-02 PROCEDURE — 36415 COLL VENOUS BLD VENIPUNCTURE: CPT

## 2022-11-02 PROCEDURE — 85025 COMPLETE CBC W/AUTO DIFF WBC: CPT

## 2022-11-02 PROCEDURE — 80048 BASIC METABOLIC PNL TOTAL CA: CPT

## 2022-11-02 PROCEDURE — 87086 URINE CULTURE/COLONY COUNT: CPT

## 2022-11-02 PROCEDURE — 99217 PR OBSERVATION CARE DISCHARGE: CPT | Performed by: NURSE PRACTITIONER

## 2022-11-02 PROCEDURE — 83605 ASSAY OF LACTIC ACID: CPT

## 2022-11-02 PROCEDURE — 700102 HCHG RX REV CODE 250 W/ 637 OVERRIDE(OP): Performed by: STUDENT IN AN ORGANIZED HEALTH CARE EDUCATION/TRAINING PROGRAM

## 2022-11-02 PROCEDURE — 97162 PT EVAL MOD COMPLEX 30 MIN: CPT

## 2022-11-02 RX ORDER — MORPHINE SULFATE 15 MG/1
15 TABLET, FILM COATED, EXTENDED RELEASE ORAL EVERY 12 HOURS
Qty: 10 TABLET | Refills: 0 | Status: SHIPPED | OUTPATIENT
Start: 2022-11-02 | End: 2022-11-02

## 2022-11-02 RX ORDER — KETOROLAC TROMETHAMINE 10 MG/1
10 TABLET, FILM COATED ORAL EVERY 6 HOURS PRN
Qty: 20 TABLET | Refills: 0 | Status: SHIPPED | OUTPATIENT
Start: 2022-11-02 | End: 2022-11-07

## 2022-11-02 RX ORDER — METHYLPREDNISOLONE 4 MG/1
TABLET ORAL
Qty: 21 TABLET | Refills: 0 | Status: SHIPPED | OUTPATIENT
Start: 2022-11-02 | End: 2022-11-15

## 2022-11-02 RX ADMIN — MORPHINE SULFATE 15 MG: 15 TABLET, FILM COATED, EXTENDED RELEASE ORAL at 05:58

## 2022-11-02 RX ADMIN — SENNOSIDES AND DOCUSATE SODIUM 2 TABLET: 50; 8.6 TABLET ORAL at 05:57

## 2022-11-02 RX ADMIN — METHOCARBAMOL 500 MG: 500 TABLET ORAL at 13:26

## 2022-11-02 RX ADMIN — ACETAMINOPHEN 1000 MG: 500 TABLET ORAL at 13:26

## 2022-11-02 RX ADMIN — AMLODIPINE BESYLATE 5 MG: 5 TABLET ORAL at 05:58

## 2022-11-02 RX ADMIN — GABAPENTIN 300 MG: 300 CAPSULE ORAL at 05:57

## 2022-11-02 RX ADMIN — ACETAMINOPHEN 1000 MG: 500 TABLET ORAL at 05:57

## 2022-11-02 ASSESSMENT — PAIN DESCRIPTION - PAIN TYPE: TYPE: ACUTE PAIN

## 2022-11-02 ASSESSMENT — COGNITIVE AND FUNCTIONAL STATUS - GENERAL
DRESSING REGULAR LOWER BODY CLOTHING: A LITTLE
HELP NEEDED FOR BATHING: A LITTLE
TURNING FROM BACK TO SIDE WHILE IN FLAT BAD: A LITTLE
SUGGESTED CMS G CODE MODIFIER MOBILITY: CK
STANDING UP FROM CHAIR USING ARMS: A LITTLE
PERSONAL GROOMING: A LITTLE
WALKING IN HOSPITAL ROOM: A LITTLE
MOBILITY SCORE: 18
MOVING TO AND FROM BED TO CHAIR: A LITTLE
TOILETING: A LITTLE
CLIMB 3 TO 5 STEPS WITH RAILING: A LITTLE
DRESSING REGULAR UPPER BODY CLOTHING: A LITTLE
SUGGESTED CMS G CODE MODIFIER DAILY ACTIVITY: CK
DAILY ACTIVITIY SCORE: 19
MOVING FROM LYING ON BACK TO SITTING ON SIDE OF FLAT BED: A LITTLE

## 2022-11-02 ASSESSMENT — GAIT ASSESSMENTS
DEVIATION: ANTALGIC
GAIT LEVEL OF ASSIST: SUPERVISED
DISTANCE (FEET): 300

## 2022-11-02 ASSESSMENT — ACTIVITIES OF DAILY LIVING (ADL): TOILETING: INDEPENDENT

## 2022-11-02 NOTE — PROGRESS NOTES
Bedside report received 0656. POC discussed with pt; Pt denies discomfort;  all questions answered at this time.

## 2022-11-02 NOTE — CARE PLAN
Problem: Pain - Standard  Goal: Alleviation of pain or a reduction in pain to the patient’s comfort goal  Outcome: Progressing     Problem: Knowledge Deficit - Standard  Goal: Patient and family/care givers will demonstrate understanding of plan of care, disease process/condition, diagnostic tests and medications  Outcome: Progressing   The patient is Stable - Low risk of patient condition declining or worsening         Progress made toward(s) clinical / shift goals:  All    Patient is not progressing towards the following goals: NA

## 2022-11-02 NOTE — ASSESSMENT & PLAN NOTE
Status post L4-L5 disc biopsy and left paravertebral phlegmon positive for MRSA during last admission, on daptomycin with end date 12/2  MRI with and without contrast obtained in ED not significantly changed from prior.  Received daptomycin in the ED at 7 PM on 11/1/2022, if he is not discharged tomorrow during the day he will need to be restarted on antimicrobials 7 PM on 11/2/2022, on daptomycin as outpatient however discussed with pharmacy they would prefer to cover with vancomycin if still inpatient at that time.  Pain control.  No changes to motor function or sensation, no episodes of incontinence, no saddle anesthesia.

## 2022-11-02 NOTE — HOSPITAL COURSE
Mr. Yonis Ward is a 52 y.o. male with hypertension, recently diagnosed with L4-5 discitis/osteomyelitis and prevertebral phlegmon biopsied and MRSA positive on daptomycin who presented 11/1/2022 with intractable low back and leg pain.     Recently admitted 10/19/2022 to 10/26/2022 and found with MRSA positive prevertebral phlegmon and L4-5 discitis/osteomyelitis and discharged with a PICC line on daptomycin to complete a 6-week course.  He was discharged with a few days of oxycodone, gabapentin, lidocaine patch.  Pain initially was well controlled unfortunately over the last few days he has been having increasing pain.  Pain located in the low back sometimes radiating down the leg, additionally also experiencing left thigh cramping and muscle spasms, occasionally low back muscle spasms as well.  No change to motor or sensory function, no loss of bowel or bladder function, no saddle anesthesia, no fevers chills headache vision changes cough chest pain dyspnea nausea vomiting abdominal pain dysuria or diarrhea.  Pain now more severe and impacting ability to ambulate.  Due to ongoing pain he presented to urgent care who had concerns for possible expansion of epidural abscess so he was sent to ED for evaluation.     In the ED he is afebrile pulse is ranged from  normal respiratory rate and room air oxygen saturation systolic blood pressures range from 130-149.  Initial work-up no leukocytosis hemoglobin 13.9 platelets 456, CMP elevated total protein and globulin otherwise unremarkable, ESR/CRP/lactic acid within normal limits.  Patient was given a dose of daptomycin subsequently referred to hospitalist for admission.    Patient was observed overnight for pain management.  No overnight events noted.  Discussed with patient continuation of daptomycin infusion for existing osteomyelitis.  Patient highly recommended to follow-up with PCP as indicated.  Discussed with patient pain management and educated on  narcotic use.  At this time, patient will be prescribed MS Contin 50 mg twice daily for 5 days only.  Patient to follow-up with PCP for refills if indicated.  All questions and concerns answered prior to being discharged.  Patient discharged home.

## 2022-11-02 NOTE — THERAPY
"Occupational Therapy   Initial Evaluation     Patient Name: Yonis Ward  Age:  52 y.o., Sex:  male  Medical Record #: [id number]  Today's Date: 11/2/2022     Precautions  Precautions: Fall Risk    Assessment    Patient is 52 y.o. male admitted due to L leg pain. Pt had a recent admission for MRSA positive prevertebral phlegmon and L4-5 discitis/osteomyelitis w/ d/c 10/26. Other pertinent medical hx includes HTN. Pt participated in OT evaluation. Pt demonstrated ability to don/doff shoes including tying shoelaces in supine w/ supv and standing handwashing w/ supv. Pt declined the need to use the bathroom during eval. Pt reported staying w/ his parents due to easier access and support they can provide. Pt's mother present for eval and stated she can assist PRN upon d/c. Pt UE strength 4/5 bilaterally and ROM WFL. Pt educated regarding limiting bending/twisting w/ good demonstration of understanding. Patient will not be actively followed for occupational therapy services at this time, however may be seen if requested by physician for 1 more visit within 30 days to address any discharge or equipment needs.      Plan    Recommend Occupational Therapy for Evaluation only.    DC Equipment Recommendations: None  Discharge Recommendations: Anticipate that the patient will have no further occupational therapy needs after discharge from the hospital     Subjective    \"I just have to keep the inflammation down. Then I can do everything I need to.\"     Objective     11/02/22 1001   Prior Living Situation   Prior Services None   Housing / Facility [address]   Bathroom Set up Walk In Shower;Shower Chair;Grab Bars   Equipment Owned Front-Wheel Walker;Grab Bar(s) In Tub / Shower;Grab Bar(s) By Toilet;Tub / Shower Seat;Quad Cane   Lives with - Patient's Self Care Capacity Parents   Comments Pt reported living w/ his parents currently due to easier access. Pt's mother present for eval and stated she can assist PRN.   Prior " Level of ADL Function   Self Feeding Independent   Grooming / Hygiene Independent   Bathing Independent   Dressing Independent   Toileting Independent   Prior Level of IADL Function   Medication Management Independent   Laundry Independent   Kitchen Mobility Independent   Finances Independent   Home Management Independent   Shopping Requires Assist   Prior Level Of Mobility Independent Without Device in Community;Independent Without Device in Home   Driving / Transportation   (Pt has been driving recently, but was I prior.)   Occupation (Pre-Hospital Vocational)   ([employer] ; pt has been out of work ~2 weeks, but hopes to return soon)   Precautions   Precautions Fall Risk   Pain   Pain Scales 0 to 10 Scale    Pain 0 - 10 Group   Therapist Pain Assessment Post Activity Pain Same as Prior to Activity  (no pain reported, not rated)   Non Verbal Descriptors   Non Verbal Scale  Calm   Cognition    Cognition / Consciousness WDL   Level of Consciousness Alert   Comments Pleasant and cooperative   Passive ROM Upper Body   Passive ROM Upper Body WDL   Active ROM Upper Body   Active ROM Upper Body  WDL   Strength Upper Body   Comments 4/5 strength bilaterally   Sensation Upper Body   Upper Extremity Sensation  WDL   Upper Body Muscle Tone   Upper Body Muscle Tone  WDL   Coordination Upper Body   Coordination   (WFL, not formally assessed)   Balance Assessment   Sitting Balance (Static) Fair   Sitting Balance (Dynamic) Fair   Standing Balance (Static) Fair   Standing Balance (Dynamic) Fair   Weight Shift Sitting Fair   Weight Shift Standing Fair   Comments no AD, no LOB   Bed Mobility    Supine to Sit Supervised   Sit to Supine Supervised   Scooting Supervised   Rolling Supervised   Comments good demo of log roll   ADL Assessment   Grooming Supervision;Standing  (washed hands)   Lower Body Dressing Supervision  (don/doff shoes including shoe tying, performed in supine)   Toileting   (declined the need)   Functional Mobility    Sit to Stand Supervised   Bed, Chair, Wheelchair Transfer Supervised   Transfer Method Stand Step   Mobility EOB>sink>bed   Comments no AD   Visual Perception   Visual Perception  Not Tested   Activity Tolerance   Sitting in Chair NT   Sitting Edge of Bed <5 min   Standing <5 min   Comments no reports of fatigue or increased work of breathing noted   Education Group   Education Provided Role of Occupational Therapist;Activities of Daily Living   Role of Occupational Therapist Patient Response Patient;Acceptance;Family;Explanation;Verbal Demonstration   ADL Patient Response Patient;Family;Acceptance;Demonstration;Explanation;Verbal Demonstration;Action Demonstration  (Discussed limiting bending, twisting and lifting; pt w/ good demonstration of understanding)

## 2022-11-02 NOTE — PROGRESS NOTES
Pt dc'd home. IV and monitor removed; monitor room notified. Pt left unit via wheelchair with UC; Transported home with mother. Personal belongings with pt when leaving unit. Pt given discharge instructions prior to leaving unit including where to  prescriptions and when to follow-up; verbalizes understanding. Copy of discharge instructions with pt and in the chart.

## 2022-11-02 NOTE — THERAPY
Physical Therapy   Initial Evaluation     Patient Name: Yonis Ward  Age:  52 y.o., Sex:  male  Medical Record #: [id number]  Today's Date: 11/2/2022     Precautions  Precautions: Fall Risk    Assessment  Patient is 52 y.o. male admitted with L LE pain x2 months. Pt with recent admission from 10/19-10/26 when he was found to have L4-5 discitis/OM and was discharge home with a PICC line. Pt states he was sent home with pain meds but they weren't providing much relief. Since receiving the pain meds this admission, pt reports significant relief in L LE pain. Pt able to complete all bed mob, transfers, and gait with no AD and SPV. Increased pain reported with increased distance but tolerable. Patient will not be actively followed for physical therapy services at this time, however may be seen if requested by physician for 1 more visit within 30 days to address any discharge or equipment needs.    Plan    Recommend Physical Therapy for Evaluation only     DC Equipment Recommendations: None  Discharge Recommendations: Recommend outpatient physical therapy services to address higher level deficits          11/02/22 0935   Prior Living Situation   Prior Services None   Housing / Facility 1 Story House   Steps Into Home 2   Steps In Home 0   Bathroom Set up Walk In Shower;Shower Chair;Grab Bars   Equipment Owned Front-Wheel Walker;Grab Bar(s) In Tub / Shower;Grab Bar(s) By Toilet;Quad Cane   Lives with - Patient's Self Care Capacity Parents   Comments pt is staying with his parents because his house is 2 story   Prior Level of Functional Mobility   Bed Mobility Independent   Transfer Status Independent   Ambulation Independent   Distance Ambulation (Feet)   (community distances)   Assistive Devices Used None   Stairs Independent   Comments intermittently limited in mobility due to pain   History of Falls   History of Falls No   Cognition    Level of Consciousness Alert   Comments receptive   Active ROM Lower Body     Active ROM Lower Body  WDL   Strength Lower Body   Lower Body Strength  WDL   Coordination Lower Body    Coordination Lower Body  WDL   Balance Assessment   Sitting Balance (Static) Fair +   Sitting Balance (Dynamic) Fair +   Standing Balance (Static) Fair   Standing Balance (Dynamic) Fair   Weight Shift Sitting Good   Weight Shift Standing Fair   Comments no AD, no LOB   Gait Analysis   Gait Level Of Assist Supervised   Assistive Device None   Distance (Feet) 300   # of Times Distance was Traveled 1   Deviation Antalgic   # of Stairs Climbed 0   Weight Bearing Status no restrictions   Comments pt reported increased pain in L LE with increased distance   Bed Mobility    Supine to Sit Supervised   Sit to Supine Supervised   Scooting Supervised   Rolling Supervised   Functional Mobility   Sit to Stand Supervised   Bed, Chair, Wheelchair Transfer Supervised   Transfer Method Stand Step   Mobility in room and hallway with no AD   Education Group   Education Provided Role of Physical Therapist   Role of Physical Therapist Patient Response Patient;Acceptance;Explanation;Verbal Demonstration   Anticipated Discharge Equipment and Recommendations   DC Equipment Recommendations None   Discharge Recommendations Recommend outpatient physical therapy services to address higher level deficits

## 2022-11-02 NOTE — PROGRESS NOTES
Received patient from ED. Patient denies pain at this time. Pt in obs for pain management and IV ABX. PICC line in place in the RUE. Dressing changed at this time. Patient AAOx3 and speaking in complete sentences without difficulty. Pt in no acute distress.

## 2022-11-02 NOTE — DISCHARGE PLANNING
Received call from Cecil with [facility] and patient is on service. Discussed that we anticipate D/C and per Cecil they will follow.

## 2022-11-02 NOTE — H&P
Hospital Medicine History & Physical Note    Date of Service  11/1/2022    Primary Care Physician  Vasile Terrell III, M.D.    Consultants  None    Code Status  Full Code    Chief Complaint  Chief Complaint   Patient presents with    Leg Pain     L sided, pt had abscess of sacral spine ongoing x2 months. Leg pain started approximately 1 week prior with intermittent numbness/tingling.  Pt has been compliant with oral abx at home.        History of Presenting Illness  Yonis Ward is a 52 y.o. male with hypertension, recently diagnosed with L4-5 discitis/osteomyelitis and prevertebral phlegmon biopsied and MRSA positive on daptomycin who presented 11/1/2022 with intractable low back and leg pain.    Recently admitted 10/19/2022 to 10/26/2022 and found with MRSA positive prevertebral phlegmon and L4-5 discitis/osteomyelitis and discharged with a PICC line on daptomycin to complete a 6-week course.  He was discharged with a few days of oxycodone, gabapentin, lidocaine patch.  Pain initially was well controlled unfortunately over the last few days he has been having increasing pain.  Pain located in the low back sometimes radiating down the leg, additionally also experiencing left thigh cramping and muscle spasms, occasionally low back muscle spasms as well.  No change to motor or sensory function, no loss of bowel or bladder function, no saddle anesthesia, no fevers chills headache vision changes cough chest pain dyspnea nausea vomiting abdominal pain dysuria or diarrhea.  Pain now more severe and impacting ability to ambulate.  Due to ongoing pain he presented to urgent care who had concerns for possible expansion of epidural abscess so he was sent to ED for evaluation.    In the ED he is afebrile pulse is ranged from  normal respiratory rate and room air oxygen saturation systolic blood pressures range from 130-149.  Initial work-up no leukocytosis hemoglobin 13.9 platelets 456, CMP elevated total protein  and globulin otherwise unremarkable, ESR/CRP/lactic acid within normal limits.  Patient was given a dose of daptomycin subsequently referred to hospitalist for admission.    I discussed the plan of care with patient, bedside RN, and pharmacy.    Review of Systems  Review of Systems   Constitutional:  Negative for chills and fever.   HENT:  Negative for congestion and nosebleeds.    Eyes:  Negative for blurred vision and double vision.   Respiratory:  Negative for cough and shortness of breath.    Cardiovascular:  Negative for chest pain and palpitations.   Gastrointestinal:  Negative for abdominal pain, diarrhea, nausea and vomiting.   Genitourinary:  Negative for dysuria and urgency.   Musculoskeletal:  Positive for back pain and myalgias.   Neurological:  Negative for sensory change, speech change and focal weakness.   Psychiatric/Behavioral:  Negative for substance abuse. The patient is not nervous/anxious.      Past Medical History   has no past medical history on file.    Surgical History   has no past surgical history on file.     Family History  family history includes Heart Disease in his father; Hypertension in his mother.       Social History   reports that he quit smoking about 4 years ago. His smoking use included cigarettes. He has never used smokeless tobacco. He reports that he does not currently use alcohol. He reports that he does not use drugs.    Allergies  No Known Allergies    Medications  Prior to Admission Medications   Prescriptions Last Dose Informant Patient Reported? Taking?   NS SOLN 50 mL with DAPTOmycin 500 MG SOLR 420 mg 10/31/2022 at 1700 Patient Yes Yes   Sig: Infuse 420 mg into a venous catheter every 24 hours.   amLODIPine (NORVASC) 5 MG Tab 11/1/2022 at 0800 Patient No No   Sig: Take 1 Tablet by mouth every day for 30 days.   gabapentin (NEURONTIN) 300 MG Cap 11/1/2022 at 0800 Patient No No   Sig: Take 1 Capsule by mouth 3 times a day for 30 days.   ibuprofen (MOTRIN) 800 MG Tab  10/31/2022 at 1600 Patient No No   Sig: Take 1 Tablet by mouth every 8 hours as needed for Moderate Pain.   lidocaine (LIDODERM) 5 % Patch 10/31/2022 at 2200 Patient No No   Sig: Place 1 Patch on the skin every 24 hours for 7 days.   Patient taking differently: Place 1 Patch on the skin every 24 hours. Apply to left quad   methocarbamol (ROBAXIN) 500 MG Tab > 1 week at unknown Patient No No   Sig: Take 1 to 2 tablets every 6 hours as needed for pain/muscle spasms.      Facility-Administered Medications: None       Physical Exam  Temp:  [36.7 °C (98 °F)-37 °C (98.6 °F)] 36.7 °C (98 °F)  Pulse:  [] 80  Resp:  [13-20] 20  BP: (130-149)/() 136/90  SpO2:  [93 %-98 %] 97 %  Blood Pressure: (!) 136/90   Temperature: 36.7 °C (98 °F)   Pulse: 80   Respiration: 20   Pulse Oximetry: 97 %       Physical Exam  Vitals and nursing note reviewed.   Constitutional:       General: He is not in acute distress.     Appearance: He is not toxic-appearing.      Comments: 52-year-old male appears stated age alert and conversant able to speak full sentences no acute distress nontoxic-appearing pleasant mood   HENT:      Head: Normocephalic and atraumatic.      Nose: Nose normal. No rhinorrhea.      Mouth/Throat:      Mouth: Mucous membranes are moist.      Pharynx: Oropharynx is clear.   Eyes:      General: No scleral icterus.     Extraocular Movements: Extraocular movements intact.      Conjunctiva/sclera: Conjunctivae normal.      Pupils: Pupils are equal, round, and reactive to light.   Cardiovascular:      Rate and Rhythm: Normal rate and regular rhythm.      Pulses: Normal pulses.      Heart sounds: No murmur heard.  Pulmonary:      Effort: Pulmonary effort is normal. No respiratory distress.      Breath sounds: Normal breath sounds. No wheezing, rhonchi or rales.   Abdominal:      Palpations: Abdomen is soft.      Tenderness: There is no abdominal tenderness. There is no guarding or rebound.   Musculoskeletal:          General: No deformity. Normal range of motion.      Cervical back: Normal range of motion and neck supple. No rigidity or tenderness.      Right lower leg: No edema.      Left lower leg: No edema.   Skin:     General: Skin is warm and dry.      Capillary Refill: Capillary refill takes less than 2 seconds.      Findings: No erythema or rash.   Neurological:      General: No focal deficit present.      Mental Status: He is alert and oriented to person, place, and time. Mental status is at baseline.      Cranial Nerves: No cranial nerve deficit.      Sensory: No sensory deficit.      Motor: No weakness.      Coordination: Coordination normal.   Psychiatric:         Mood and Affect: Mood normal.         Behavior: Behavior normal.         Thought Content: Thought content normal.         Judgment: Judgment normal.       Laboratory:  Recent Labs     11/01/22  1409   WBC 5.8   RBC 4.80   HEMOGLOBIN 13.9*   HEMATOCRIT 41.9*   MCV 87.3   MCH 29.0   MCHC 33.2*   RDW 44.0   PLATELETCT 456*   MPV 8.3*     Recent Labs     11/01/22  1409   SODIUM 140   POTASSIUM 3.6   CHLORIDE 101   CO2 27   GLUCOSE 99   BUN 22   CREATININE 0.71   CALCIUM 9.7     Recent Labs     11/01/22  1409   ALTSGPT 14   ASTSGOT 15   ALKPHOSPHAT 97   TBILIRUBIN 0.3   GLUCOSE 99         No results for input(s): NTPROBNP in the last 72 hours.      No results for input(s): TROPONINT in the last 72 hours.    Imaging:  DX-CHEST-PORTABLE (1 VIEW)   Final Result      1.  No acute cardiopulmonary abnormality.   2.  Right PICC tip is in the superior cavoatrial junction.      MR-LUMBAR SPINE-WITH & W/O    (Results Pending)       X-Ray:  I have personally reviewed the images and compared with prior images. and My impression is: Chest x-ray negative for acute cardiopulmonary processes    Assessment/Plan:  Justification for Admission Status  I anticipate this patient is appropriate for observation status at this time because intractable back pain      * Intractable back  pain- (present on admission)  Assessment & Plan  Multimodal pain therapy with Tylenol, morphine, Toradol, Robaxin.  Adjust regimen as needed, patient reported significant benefit from Toradol.    Elevated blood-pressure reading without diagnosis of hypertension- (present on admission)  Assessment & Plan  Previously discharged on amlodipine, will continue.    Osteomyelitis of vertebra, epidural abscess (HCC)- (present on admission)  Assessment & Plan  Status post L4-L5 disc biopsy and left paravertebral phlegmon positive for MRSA during last admission, on daptomycin with end date 12/2  MRI with and without contrast obtained in ED not significantly changed from prior.  Received daptomycin in the ED at 7 PM on 11/1/2022, if he is not discharged tomorrow during the day he will need to be restarted on antimicrobials 7 PM on 11/2/2022, on daptomycin as outpatient however discussed with pharmacy they would prefer to cover with vancomycin if still inpatient at that time.  Pain control.  No changes to motor function or sensation, no episodes of incontinence, no saddle anesthesia.      VTE prophylaxis: SCDs/TEDs

## 2022-11-02 NOTE — ED NOTES
Pt is apparently infusing daptomycin at home. Interviewed pt again. Pt confirmed that he is infusing daptomycin at home. Daptomycin  ( 420 mg) added to med rec.

## 2022-11-02 NOTE — ASSESSMENT & PLAN NOTE
Multimodal pain therapy with Tylenol, morphine, Toradol, Robaxin.  Adjust regimen as needed, patient reported significant benefit from Toradol.

## 2022-11-02 NOTE — ED NOTES
Per erp, po fluids ok. Soda and warm blanket given. Pt states left hip pain 3/10, declines need for any pain medication at this time.

## 2022-11-02 NOTE — PROGRESS NOTES
4 Eyes Skin Assessment Completed by PAIGE Castro and PAIGE Jaime.    Head WDL  Ears WDL  Nose WDL  Mouth WDL  Neck WDL  Breast/Chest WDL  Shoulder Blades WDL  Spine WDL  (R) Arm/Elbow/Hand WDL  (L) Arm/Elbow/Hand WDL  Abdomen WDL  Groin WDL  Scrotum/Coccyx/Buttocks WDL  (R) Leg WDL  (L) Leg WDL  (R) Heel/Foot/Toe WDL  (L) Heel/Foot/Toe WDL          Devices In Places Pulse Ox and Central Line      Interventions In Place N/A    Possible Skin Injury No    Pictures Uploaded Into Epic N/A  Wound Consult Placed N/A  RN Wound Prevention Protocol Ordered No

## 2022-11-02 NOTE — DISCHARGE SUMMARY
Discharge Summary    CHIEF COMPLAINT ON ADMISSION  Chief Complaint   Patient presents with    Leg Pain     L sided, pt had abscess of sacral spine ongoing x2 months. Leg pain started approximately 1 week prior with intermittent numbness/tingling.  Pt has been compliant with oral abx at home.        Reason for Admission  sent by      Admission Date  11/1/2022    CODE STATUS  Full Code    HPI & HOSPITAL COURSE    Mr. Yonis Ward is a 52 y.o. male with hypertension, recently diagnosed with L4-5 discitis/osteomyelitis and prevertebral phlegmon biopsied and MRSA positive on daptomycin who presented 11/1/2022 with intractable low back and leg pain.     Recently admitted 10/19/2022 to 10/26/2022 and found with MRSA positive prevertebral phlegmon and L4-5 discitis/osteomyelitis and discharged with a PICC line on daptomycin to complete a 6-week course.  He was discharged with a few days of oxycodone, gabapentin, lidocaine patch.  Pain initially was well controlled unfortunately over the last few days he has been having increasing pain.  Pain located in the low back sometimes radiating down the leg, additionally also experiencing left thigh cramping and muscle spasms, occasionally low back muscle spasms as well.  No change to motor or sensory function, no loss of bowel or bladder function, no saddle anesthesia, no fevers chills headache vision changes cough chest pain dyspnea nausea vomiting abdominal pain dysuria or diarrhea.  Pain now more severe and impacting ability to ambulate.  Due to ongoing pain he presented to urgent care who had concerns for possible expansion of epidural abscess so he was sent to ED for evaluation.     In the ED he is afebrile pulse is ranged from  normal respiratory rate and room air oxygen saturation systolic blood pressures range from 130-149.  Initial work-up no leukocytosis hemoglobin 13.9 platelets 456, CMP elevated total protein and globulin otherwise unremarkable,  ESR/CRP/lactic acid within normal limits.  Patient was given a dose of daptomycin subsequently referred to hospitalist for admission.    Patient was observed overnight for pain management.  No overnight events noted.  Discussed with patient continuation of daptomycin infusion for existing osteomyelitis.  Patient highly recommended to follow-up with PCP as indicated.  Discussed with patient pain management and educated on narcotic use.  At this time, patient will be prescribed MS Contin 50 mg twice daily for 5 days only.  Patient to follow-up with PCP for refills if indicated.  All questions and concerns answered prior to being discharged.  Patient discharged home.    Therefore, he is discharged in good and stable condition to home with close outpatient follow-up.    The patient recovered much more quickly than anticipated on admission.    Discharge Date  11/02/22      FOLLOW UP ITEMS POST DISCHARGE  Please call [phone] to schedule PCP appointment for patient.    Required specialty appointments include:       Discharge Instructions per MARIANNE ManningPRoselynRRoselynNRoselyn    -Follow-up with PCP s/p hospitalization  -Continue daptomycin IV infusion for the 6-week course as managed by infectious disease for osteomyelitis  -Resume all other home medications  -Utilize prescribed MS Contin 50 mg twice daily for 5 days for better pain control at home    DIET: As tolerated    ACTIVITY: As tolerated    DIAGNOSIS: Intractable back pain    Return to ER if symptoms persist, chest pain, palpitations, shortness of breath, numbness, tingling, weakness, and high fevers.      DISCHARGE DIAGNOSES  Principal Problem:    Intractable back pain POA: Yes  Active Problems:    Osteomyelitis of vertebra, epidural abscess (HCC) POA: Yes    Elevated blood-pressure reading without diagnosis of hypertension POA: Yes  Resolved Problems:    * No resolved hospital problems. *      FOLLOW UP  Future Appointments   Date Time Provider Department  [place]   11/15/2022  7:20 AM Emilia Davies M.D. [facility] [place]   12/1/2022  1:20 PM [facility] None     Vasile Terrell III, M.D.  [address]  [place] [address]  [phone]    Schedule an appointment as soon as possible for a visit in 1 week(s)        MEDICATIONS ON DISCHARGE     Medication List        START taking these medications        Instructions   ketorolac 10 MG Tabs  Commonly known as: TORADOL   Take 1 Tablet by mouth every 6 hours as needed for Moderate Pain or Mild Pain for up to 5 days.  Dose: 10 mg     methylPREDNISolone 4 MG Tbpk  Commonly known as: MEDROL DOSEPAK   Follow schedule on package instructions.            CHANGE how you take these medications        Instructions   lidocaine 5 % Ptch  What changed: additional instructions  Commonly known as: LIDODERM   Place 1 Patch on the skin every 24 hours for 7 days.  Dose: 1 Patch            CONTINUE taking these medications        Instructions   amLODIPine 5 MG Tabs  Commonly known as: NORVASC   Take 1 Tablet by mouth every day for 30 days.  Dose: 5 mg     gabapentin 300 MG Caps  Commonly known as: NEURONTIN   Take 1 Capsule by mouth 3 times a day for 30 days.  Dose: 300 mg     ibuprofen 800 MG Tabs  Commonly known as: MOTRIN   Take 1 Tablet by mouth every 8 hours as needed for Moderate Pain.  Dose: 800 mg     methocarbamol 500 MG Tabs  Commonly known as: ROBAXIN   Take 1 to 2 tablets every 6 hours as needed for pain/muscle spasms.     NS SOLN 50 mL with DAPTOmycin 500 MG SOLR 420 mg   Infuse 420 mg into a venous catheter every 24 hours.  Dose: 420 mg              Allergies  No Known Allergies    DIET  Orders Placed This Encounter   Procedures    Diet Order Diet: Regular     Standing Status:   Standing     Number of Occurrences:   1     Order Specific Question:   Diet:     Answer:   Regular [1]       ACTIVITY  As tolerated.  Weight bearing as tolerated    CONSULTATIONS  NONE    PROCEDURES  NONE    IMAGING  MR-LUMBAR  SPINE-WITH & W/O   Final Result      1.  L4 and L5 osteomyelitis.   2.  L4-5 discitis.   3.  Prevertebral and epidural phlegmon at the level of L4-5.   4.  Been mild interval decrease in the extent of prevertebral phlegmon.      DX-CHEST-PORTABLE (1 VIEW)   Final Result      1.  No acute cardiopulmonary abnormality.   2.  Right PICC tip is in the superior cavoatrial junction.            LABORATORY  Lab Results   Component Value Date    SODIUM 135 11/02/2022    POTASSIUM 4.0 11/02/2022    CHLORIDE 98 11/02/2022    CO2 27 11/02/2022    GLUCOSE 108 (H) 11/02/2022    BUN 23 (H) 11/02/2022    CREATININE 0.73 11/02/2022        Lab Results   Component Value Date    WBC 6.0 11/02/2022    HEMOGLOBIN 12.5 (L) 11/02/2022    HEMATOCRIT 39.1 (L) 11/02/2022    PLATELETCT 345 11/02/2022        Total time of the discharge process took 36 minutes.    ================================================================================================================  Please note that this dictation was created using voice recognition software. I have made every reasonable attempt to correct obvious errors, but there may be errors of grammar and possibly content that I did not discover before finalizing the note.    Electronically signed by:  Dr. SALBADOR Lovett, DNP, APRN, FNP-C  Hospitalist Services  [facility]  [phone]  [email or web address]  11/02/22                 [id number]

## 2022-11-02 NOTE — DISCHARGE INSTRUCTIONS
Discharge Instructions    Discharged to home by car with relative. Discharged via wheelchair, hospital escort: Yes.  Special equipment needed: Not Applicable    Be sure to schedule a follow-up appointment with your primary care doctor or any specialists as instructed.     Discharge Plan:   Diet Plan: Discussed  Activity Level: Discussed  Confirmed Follow up Appointment: Appointment Scheduled  Confirmed Symptoms Management: Discussed  Medication Reconciliation Updated: Yes  Influenza Vaccine Indication: Patient Refuses    I understand that a diet low in cholesterol, fat, and sodium is recommended for good health. Unless I have been given specific instructions below for another diet, I accept this instruction as my diet prescription.   Other diet: Heat healthy     Special Instructions: None    -Is this patient being discharged with medication to prevent blood clots?  No    Is patient discharged on Warfarin / Coumadin?   No               FOLLOW UP ITEMS POST DISCHARGE  Please call [phone] to schedule PCP appointment for patient.    Required specialty appointments include:       Discharge Instructions per MARIANNE ManningPRoselynRRoselynNRoselyn    -Follow-up with PCP s/p hospitalization  -Continue daptomycin IV infusion for the 6-week course as managed by infectious disease for osteomyelitis  -Resume all other home medications  -Utilize prescribed MS Contin 50 mg twice daily for 5 days for better pain control at home    DIET: As tolerated    ACTIVITY: As tolerated    DIAGNOSIS: Intractable back pain    Return to ER if symptoms persist, chest pain, palpitations, shortness of breath, numbness, tingling, weakness, and high fevers.

## 2022-11-04 LAB
BACTERIA UR CULT: NORMAL
SIGNIFICANT IND 70042: NORMAL
SITE SITE: NORMAL
SOURCE SOURCE: NORMAL

## 2022-11-06 LAB
BACTERIA BLD CULT: NORMAL
BACTERIA BLD CULT: NORMAL
SIGNIFICANT IND 70042: NORMAL
SIGNIFICANT IND 70042: NORMAL
SITE SITE: NORMAL
SITE SITE: NORMAL
SOURCE SOURCE: NORMAL
SOURCE SOURCE: NORMAL

## 2022-11-10 ENCOUNTER — TELEPHONE (OUTPATIENT)
Dept: RHEUMATOLOGY | Facility: MEDICAL CENTER | Age: 52
End: 2022-11-10
Payer: COMMERCIAL

## 2022-11-10 DIAGNOSIS — M46.20 OSTEOMYELITIS OF VERTEBRA (HCC): ICD-10-CM

## 2022-11-10 NOTE — TELEPHONE ENCOUNTER
Pt discharged from home health as he returned to work and is no longer home bound.    Orders needed for labs and PICC care. Orders will likely need to go to [facility].

## 2022-11-14 LAB
FUNGUS SPEC CULT: NORMAL
FUNGUS SPEC CULT: NORMAL
FUNGUS SPEC FUNGUS STN: NORMAL
FUNGUS SPEC FUNGUS STN: NORMAL
SIGNIFICANT IND 70042: NORMAL
SIGNIFICANT IND 70042: NORMAL
SITE SITE: NORMAL
SITE SITE: NORMAL
SOURCE SOURCE: NORMAL
SOURCE SOURCE: NORMAL

## 2022-11-14 NOTE — PROGRESS NOTES
Subjective:     Yonis Ward is a 52 y.o. male who presents for Hospital Follow-up.    POST DISCHARGE CALL:  Discharge Date:*   Date of Outreach Call: *  Now that you're home, how are you doing? *  Did you receive any new prescriptions? *  Were you able to fill those medications? *  How did you fill those prescriptions? *  If not able to fill prescriptions, why? *    Do you have questions about your medications? *  Do you have a follow-up appointment scheduled?*  Any issues or paperwork you wish to discuss with your PCP? *  Does patient qualify for CCM Program? *  If patient qualifies, was CCM Program Introduced? *  If patient does not qualify, comment? *  Number of Attempts: *  Current or previous attempts completed within two business days of discharge? *  Provided education regarding treatment plan, medication, self-management, ADLs? *  Has patient completed Advance Directive? If yes, advise them to bring to appointment. *  Care Manager phone number provided? *  Is there anything else I can help you with? *  Chief Complaint? *  Admitting Dx? *  Discharge Diagnosis? *  Additional Comments? *    HPI:       Current medicines (including reconciliation performed today)  Current Outpatient Medications   Medication Sig Dispense Refill    methylPREDNISolone (MEDROL DOSEPAK) 4 MG Tablet Therapy Pack Follow schedule on package instructions. 21 Tablet 0    NS SOLN 50 mL with DAPTOmycin 500 MG SOLR 420 mg Infuse 420 mg into a venous catheter every 24 hours.      amLODIPine (NORVASC) 5 MG Tab Take 1 Tablet by mouth every day for 30 days. 30 Tablet 0    gabapentin (NEURONTIN) 300 MG Cap Take 1 Capsule by mouth 3 times a day for 30 days. 90 Capsule 0    ibuprofen (MOTRIN) 800 MG Tab Take 1 Tablet by mouth every 8 hours as needed for Moderate Pain. 30 Tablet 0    methocarbamol (ROBAXIN) 500 MG Tab Take 1 to 2 tablets every 6 hours as needed for pain/muscle spasms. 90 Tablet 0     No current facility-administered  medications for this visit.       Allergies:   Patient has no known allergies.    Social History     Tobacco Use    Smoking status: Former     Types: Cigarettes     Quit date: 2018     Years since quittin.1    Smokeless tobacco: Never   Vaping Use    Vaping Use: Never used   Substance Use Topics    Alcohol use: Not Currently     Comment: occ    Drug use: Never       ROS:  Constitutional: Negative for fever, chills.  Respiratory: Negative for cough and shortness of breath.    Cardiovascular: Negative for chest pain or palpitations.  Gastrointestinal: Negative for abdominal pain, nausea, vomiting, and diarrhea.   Neurological: Negative for headaches, numbness or tingling.  Psychiatric: Negative for anxiety or depression.    Objective:     There were no vitals filed for this visit.  There is no height or weight on file to calculate BMI.    Physical Exam:  Constitutional: Well-developed, no acute distress.  HEENT: Pupils are equal, round, and reactive to light. Oropharynx is without erythema, edema or exudates.   Neck: No thyromegaly or palpable thyroid nodules. No cervical or supraclavicular lymphadenopathy noted.  Lungs: Clear to auscultation bilaterally. No wheezes, rhonchi, or rales.   Cardiovascular: Regular rate and rhythm, no murmurs noted.   Abdomen: Soft, nontender, nondistended.   Extremities: No edema or erythema.  Psychiatric:  Behavior, mood, and affect are appropriate.    Assessment and Plan:       - Chart and discharge summary were reviewed.   - Hospitalization and results reviewed with patient.   - Medications reviewed including instructions regarding high risk medications, dosing and side effects.  - Recommended Services: {COORDINATION OF SERVICES:}  - Advance directive/POLST on file?  {Yes/No:}    Follow-up:No follow-ups on file.    Face-to-face transitional care management services with {TCM Complexity:11622} medical decision complexity were provided.     LACE+ Historical Score Over  Time (0-28: Low, 29-58: Medium, 59+: High): 19      {                                                      reference text will not save to note  Coding guide   77888        - face-to-face within 14 day        - moderate decision complexity (LACE+ score of 28-58)  61067       - face-to-face within 7 days       - high medical decision complexity (LACE+ score 59+)    :63647}

## 2022-11-15 ENCOUNTER — OFFICE VISIT (OUTPATIENT)
Dept: MEDICAL GROUP | Facility: PHYSICIAN GROUP | Age: 52
End: 2022-11-15
Payer: COMMERCIAL

## 2022-11-15 VITALS
HEIGHT: 66 IN | SYSTOLIC BLOOD PRESSURE: 144 MMHG | WEIGHT: 116.8 LBS | BODY MASS INDEX: 18.77 KG/M2 | RESPIRATION RATE: 20 BRPM | TEMPERATURE: 97.5 F | DIASTOLIC BLOOD PRESSURE: 86 MMHG | OXYGEN SATURATION: 97 % | HEART RATE: 89 BPM

## 2022-11-15 DIAGNOSIS — M46.20 OSTEOMYELITIS OF VERTEBRA (HCC): ICD-10-CM

## 2022-11-15 DIAGNOSIS — M54.9 INTRACTABLE BACK PAIN: ICD-10-CM

## 2022-11-15 DIAGNOSIS — I10 HYPERTENSION, UNSPECIFIED TYPE: ICD-10-CM

## 2022-11-15 PROBLEM — G06.1 ABSCESS IN EPIDURAL SPACE OF LUMBAR SPINE: Status: ACTIVE | Noted: 2022-11-11

## 2022-11-15 PROBLEM — M54.16 LUMBAR RADICULOPATHY: Status: ACTIVE | Noted: 2022-11-11

## 2022-11-15 PROBLEM — M46.46 LUMBAR DISCITIS: Status: ACTIVE | Noted: 2022-11-11

## 2022-11-15 PROCEDURE — 99214 OFFICE O/P EST MOD 30 MIN: CPT | Performed by: INTERNAL MEDICINE

## 2022-11-15 RX ORDER — KETOROLAC TROMETHAMINE 30 MG/ML
60 INJECTION, SOLUTION INTRAMUSCULAR; INTRAVENOUS ONCE
Status: COMPLETED | OUTPATIENT
Start: 2022-11-15 | End: 2022-11-15

## 2022-11-15 RX ORDER — MELOXICAM 15 MG/1
15 TABLET ORAL DAILY
Qty: 30 TABLET | Refills: 2 | Status: SHIPPED | OUTPATIENT
Start: 2022-11-15 | End: 2023-01-15

## 2022-11-15 RX ADMIN — KETOROLAC TROMETHAMINE 60 MG: 30 INJECTION, SOLUTION INTRAMUSCULAR; INTRAVENOUS at 09:32

## 2022-11-15 ASSESSMENT — FIBROSIS 4 INDEX: FIB4 SCORE: 0.58

## 2022-11-16 ENCOUNTER — HOSPITAL ENCOUNTER (OUTPATIENT)
Facility: MEDICAL CENTER | Age: 52
End: 2022-11-16
Attending: INTERNAL MEDICINE
Payer: COMMERCIAL

## 2022-11-16 DIAGNOSIS — M54.9 INTRACTABLE BACK PAIN: ICD-10-CM

## 2022-11-16 PROCEDURE — G0481 DRUG TEST DEF 8-14 CLASSES: HCPCS

## 2022-11-16 RX ORDER — AMLODIPINE BESYLATE 5 MG/1
5 TABLET ORAL DAILY
Qty: 90 TABLET | Refills: 0 | Status: SHIPPED | OUTPATIENT
Start: 2022-11-16 | End: 2023-02-14

## 2022-11-16 NOTE — PROGRESS NOTES
Subjective:     CC:   Chief Complaint   Patient presents with    Follow-Up    Pain     Right leg on pain all the time         HPI:   Yonis Reeves is a 52-year-old male who presents for an acute medical visit.  He is a patient of Dr. Vasile Terrell. The patient was initially admitted from 10/19/2022 to 10/26/2022 for acute back pain and found to have MRSA positive prevertebral abscess with L4-L5 discitis/osteomyelitis.  He was discharged home with a PICC line and a  6-week course of daptomycin.  He was then readmitted from 2022 to 2022 for intractable pain.  He was given a prescription for MS Contin 50 mg twice daily x5 days, but the patient states he was unable to obtain the medication as it required preauthorization.  The patient had a follow-up visit with neurosurgery on 2022.  They recommend surgical intervention once the patient has completed his course of antibiotics.  He has a follow-up visit scheduled with infectious disease for 2022.  He is now complaining of worsening lower back pain with radiation down the right leg.  He denies any fevers or chills, saddle anesthesia, bladder or bowel incontinence, focal lower extremity weakness.  The patient states his home health was also discontinued as he returned to work.        History reviewed. No pertinent past medical history.    Social History     Tobacco Use    Smoking status: Former     Types: Cigarettes     Quit date: 2018     Years since quittin.2    Smokeless tobacco: Never   Vaping Use    Vaping Use: Never used   Substance Use Topics    Alcohol use: Not Currently     Comment: occ    Drug use: Never       Current Outpatient Medications Ordered in Epic   Medication Sig Dispense Refill    meloxicam (MOBIC) 15 MG tablet Take 1 Tablet by mouth every day. 30 Tablet 2    sulfamethoxazole-trimethoprim (BACTRIM DS) 800-160 MG tablet Take 1 Tablet by mouth 2 times a day for 90 days. 180 Tablet 0    lidocaine (LIDODERM) 5 % Patch  "Place 1 Patch on the skin as needed (Apply's on back). (Patient not taking: Reported on 12/1/2022)      methocarbamol (ROBAXIN) 500 MG Tab Take 1-2 Tablets by mouth 2 times a day. Pt takes 500MG in AM and 1000MG at night      acetaminophen (TYLENOL) 500 MG Tab Take 2 Tablets by mouth every 6 hours as needed for Moderate Pain.      amLODIPine (NORVASC) 5 MG Tab Take 1 Tablet by mouth every day for 90 days. 90 Tablet 0    NS SOLN 50 mL with DAPTOmycin 500 MG SOLR 420 mg Infuse 420 mg into a venous catheter every 24 hours. Pt started on 10/24/2022      ibuprofen (MOTRIN) 800 MG Tab Take 1 Tablet by mouth every 8 hours as needed for Moderate Pain. (Patient not taking: Reported on 12/1/2022) 30 Tablet 0     No current Epic-ordered facility-administered medications on file.       Allergies:  Patient has no known allergies.    Health Maintenance: Deferred    Review of Systems:   Denies fevers, chills, or sweats      Objective:       Exam:  BP (!) 144/86 (BP Location: Right arm, Patient Position: Sitting, BP Cuff Size: Adult)   Pulse 89   Temp 36.4 °C (97.5 °F) (Temporal)   Resp 20   Ht 1.676 m (5' 6\")   Wt 53 kg (116 lb 12.8 oz)   SpO2 97%   BMI 18.85 kg/m²  Body mass index is 18.85 kg/m².    Gen: Patient lying on the exam chair groaning in pain, unable to sit upright secondary to the pain      Assessment & Plan:     52 y.o. male with the following -     Osteomyelitis of vertebra, epidural abscess (HCC)  Intractable back pain  Chronic medical condition.  Progressive.  The patient was initially admitted from 10/19/2022 to 10/26/2022 for acute back pain and found to have MRSA positive prevertebral abscess with L4-L5 discitis/osteomyelitis.  He was discharged home with a PICC line and a  6-week course of daptomycin.  He was then readmitted from 11/1/2022 to 11/2/2022 for intractable pain.  He was given a prescription for MS Contin 50 mg twice daily x5 days, but the patient states he was unable to obtain the medication " as it required preauthorization.  The patient had a follow-up visit with neurosurgery on 11/11/2022.  They recommend surgical intervention once the patient has completed his course of antibiotics.  He has a follow-up visit scheduled with infectious disease for 12/1/2022.  He is now complaining of worsening lower back pain with radiation down the right leg.  He denies any fevers or chills, saddle anesthesia, bladder or bowel incontinence, focal lower extremity weakness.  The patient states his home health was also discontinued as he returned to work. On exam, the patient appears to be in significant distress secondary to pain.  He is unable to sit upright in the exam chair secondary to the pain.  Blood pressure is slightly elevated, but the patient is afebrile and not tachycardic.    -Given the worsening nature of the pain, the patient was encouraged to return to the ED for further evaluation and treatment, but he is currently declining  -He was given a Toradol injection for acute pain relief as well as a prescription for meloxicam 15 mg daily  -Discussed with patient that I am unable to prescribe narcotics pending completion of a urine drug screen, it was ordered at today's visit, the patient states he will give a urine sample on his way out  -The patient signed the controlled substance treatment agreement and it has been scanned into the patient's chart on 11/15/2022  -Once I have received the urine drug screen results, I have agreed to give him a limited supply of pain medication, he will then need to follow-up with neurosurgery or his PCP for additional refills  -Urine drug screen ordered on 11/15/2022  - Pain Management Screen; Future  - ketorolac (TORADOL) injection 60 mg  - Controlled Substance Treatment Agreement  - meloxicam (MOBIC) 15 MG tablet; Take 1 Tablet by mouth every day.  Dispense: 30 Tablet; Refill: 2      Return if symptoms worsen or fail to improve.    Please note that this dictation was created  using voice recognition software. I have made every reasonable attempt to correct obvious errors, but I expect that there are errors of grammar and possibly content that I did not discover before finalizing the note.

## 2022-11-20 ENCOUNTER — HOSPITAL ENCOUNTER (EMERGENCY)
Facility: MEDICAL CENTER | Age: 52
End: 2022-11-20
Attending: EMERGENCY MEDICINE
Payer: COMMERCIAL

## 2022-11-20 ENCOUNTER — APPOINTMENT (OUTPATIENT)
Dept: RADIOLOGY | Facility: MEDICAL CENTER | Age: 52
End: 2022-11-20
Attending: EMERGENCY MEDICINE
Payer: COMMERCIAL

## 2022-11-20 VITALS
BODY MASS INDEX: 18.81 KG/M2 | HEART RATE: 89 BPM | TEMPERATURE: 97.4 F | HEIGHT: 66 IN | RESPIRATION RATE: 16 BRPM | SYSTOLIC BLOOD PRESSURE: 137 MMHG | OXYGEN SATURATION: 95 % | WEIGHT: 117.06 LBS | DIASTOLIC BLOOD PRESSURE: 93 MMHG

## 2022-11-20 DIAGNOSIS — M46.40 DISCITIS, UNSPECIFIED SPINAL REGION: ICD-10-CM

## 2022-11-20 LAB
1OH-MIDAZOLAM UR QL SCN: NOT DETECTED
6MAM UR QL: NOT DETECTED
7AMINOCLONAZEPAM UR QL: NOT DETECTED
A-OH ALPRAZ UR QL: NOT DETECTED
ALBUMIN SERPL BCP-MCNC: 4.4 G/DL (ref 3.2–4.9)
ALBUMIN/GLOB SERPL: 1.3 G/DL
ALP SERPL-CCNC: 79 U/L (ref 30–99)
ALPRAZ UR QL: NOT DETECTED
ALT SERPL-CCNC: 14 U/L (ref 2–50)
AMPHET UR QL SCN: NEGATIVE
AMPHET UR QL SCN: NOT DETECTED
ANION GAP SERPL CALC-SCNC: 10 MMOL/L (ref 7–16)
ANNOTATION COMMENT IMP: NORMAL
ANNOTATION COMMENT IMP: NORMAL
AST SERPL-CCNC: 13 U/L (ref 12–45)
BARBITURATES UR QL SCN: NEGATIVE
BARBITURATES UR QL: NOT DETECTED
BASOPHILS # BLD AUTO: 0.4 % (ref 0–1.8)
BASOPHILS # BLD: 0.02 K/UL (ref 0–0.12)
BENZODIAZ UR QL SCN: NEGATIVE
BILIRUB SERPL-MCNC: 0.4 MG/DL (ref 0.1–1.5)
BUN SERPL-MCNC: 13 MG/DL (ref 8–22)
BUPRENORPHINE UR QL: NOT DETECTED
BZE UR QL SCN: NEGATIVE
BZE UR QL: NOT DETECTED
CALCIUM SERPL-MCNC: 9.5 MG/DL (ref 8.4–10.2)
CANNABINOIDS UR QL SCN: NEGATIVE
CARBOXYTHC UR QL: NOT DETECTED
CARISOPRODOL UR QL: NOT DETECTED
CHLORIDE SERPL-SCNC: 101 MMOL/L (ref 96–112)
CLONAZEPAM UR QL: NOT DETECTED
CO2 SERPL-SCNC: 27 MMOL/L (ref 20–33)
CODEINE UR QL: NOT DETECTED
CREAT SERPL-MCNC: 0.58 MG/DL (ref 0.5–1.4)
CRP SERPL HS-MCNC: 0.42 MG/DL (ref 0–0.75)
DIAZEPAM UR QL: NOT DETECTED
EOSINOPHIL # BLD AUTO: 0.11 K/UL (ref 0–0.51)
EOSINOPHIL NFR BLD: 2 % (ref 0–6.9)
ERYTHROCYTE [DISTWIDTH] IN BLOOD BY AUTOMATED COUNT: 46.5 FL (ref 35.9–50)
ERYTHROCYTE [SEDIMENTATION RATE] IN BLOOD BY WESTERGREN METHOD: 11 MM/HOUR (ref 0–20)
ETHYL GLUCURONIDE UR QL: NOT DETECTED
FENTANYL UR QL: NOT DETECTED
GABAPENTIN UR QL: PRESENT
GFR SERPLBLD CREATININE-BSD FMLA CKD-EPI: 118 ML/MIN/1.73 M 2
GLOBULIN SER CALC-MCNC: 3.3 G/DL (ref 1.9–3.5)
GLUCOSE SERPL-MCNC: 104 MG/DL (ref 65–99)
HCT VFR BLD AUTO: 42.4 % (ref 42–52)
HGB BLD-MCNC: 13.7 G/DL (ref 14–18)
HYDROCODONE UR QL: NOT DETECTED
HYDROMORPHONE UR QL: NOT DETECTED
IMM GRANULOCYTES # BLD AUTO: 0.01 K/UL (ref 0–0.11)
IMM GRANULOCYTES NFR BLD AUTO: 0.2 % (ref 0–0.9)
LACTATE SERPL-SCNC: 1.3 MMOL/L (ref 0.5–2)
LORAZEPAM UR QL: NOT DETECTED
LYMPHOCYTES # BLD AUTO: 1.11 K/UL (ref 1–4.8)
LYMPHOCYTES NFR BLD: 19.9 % (ref 22–41)
MCH RBC QN AUTO: 29.1 PG (ref 27–33)
MCHC RBC AUTO-ENTMCNC: 32.3 G/DL (ref 33.7–35.3)
MCV RBC AUTO: 90.2 FL (ref 81.4–97.8)
MDA UR QL: NOT DETECTED
MDEA UR QL: NOT DETECTED
MDMA UR QL: NOT DETECTED
MEPERIDINE UR QL: NOT DETECTED
METHADONE UR QL SCN: NEGATIVE
METHADONE UR QL: NOT DETECTED
METHAMPHET UR QL: NOT DETECTED
MIDAZOLAM UR QL SCN: NOT DETECTED
MONOCYTES # BLD AUTO: 0.47 K/UL (ref 0–0.85)
MONOCYTES NFR BLD AUTO: 8.4 % (ref 0–13.4)
MORPHINE UR QL: NOT DETECTED
NALOXONE UR QL SCN: NOT DETECTED
NEUTROPHILS # BLD AUTO: 3.86 K/UL (ref 1.82–7.42)
NEUTROPHILS NFR BLD: 69.1 % (ref 44–72)
NORBUPRENORPHINE UR QL CFM: NOT DETECTED
NORDIAZEPAM UR QL: NOT DETECTED
NORFENTANYL UR QL: NOT DETECTED
NORHYDROCODONE UR QL CFM: NOT DETECTED
NOROXYCODONE UR QL CFM: NOT DETECTED
NOROXYMORPH CO100 Q0458: NOT DETECTED
NRBC # BLD AUTO: 0 K/UL
NRBC BLD-RTO: 0 /100 WBC
OPIATES UR QL SCN: NEGATIVE
OXAZEPAM UR QL: NOT DETECTED
OXYCODONE UR QL SCN: NEGATIVE
OXYCODONE UR QL: NOT DETECTED
OXYMORPHONE UR QL: NOT DETECTED
PATHOLOGY STUDY: NORMAL
PCP UR QL SCN: NEGATIVE
PCP UR QL: NOT DETECTED
PHENTERMINE UR QL: NOT DETECTED
PLATELET # BLD AUTO: 316 K/UL (ref 164–446)
PMV BLD AUTO: 8.3 FL (ref 9–12.9)
POTASSIUM SERPL-SCNC: 4.2 MMOL/L (ref 3.6–5.5)
PPAA UR QL: NOT DETECTED
PREGABALIN UR QL SCN: NOT DETECTED
PROPOXYPH UR QL SCN: NEGATIVE
PROT SERPL-MCNC: 7.7 G/DL (ref 6–8.2)
RBC # BLD AUTO: 4.7 M/UL (ref 4.7–6.1)
SERVICE CMNT-IMP: NORMAL
SODIUM SERPL-SCNC: 138 MMOL/L (ref 135–145)
TAPENADOL OSULF CO200 Q0473: NOT DETECTED
TAPENTADOL UR QL SCN: NOT DETECTED
TEMAZEPAM UR QL: NOT DETECTED
TRAMADOL UR QL: NOT DETECTED
WBC # BLD AUTO: 5.6 K/UL (ref 4.8–10.8)
ZOLPIDEM PHENYL-4-CARB UR QL SCN: NOT DETECTED
ZOLPIDEM UR QL: NOT DETECTED

## 2022-11-20 PROCEDURE — 700117 HCHG RX CONTRAST REV CODE 255: Performed by: EMERGENCY MEDICINE

## 2022-11-20 PROCEDURE — 36415 COLL VENOUS BLD VENIPUNCTURE: CPT

## 2022-11-20 PROCEDURE — 87040 BLOOD CULTURE FOR BACTERIA: CPT | Mod: 91

## 2022-11-20 PROCEDURE — 86140 C-REACTIVE PROTEIN: CPT

## 2022-11-20 PROCEDURE — 80307 DRUG TEST PRSMV CHEM ANLYZR: CPT

## 2022-11-20 PROCEDURE — 72149 MRI LUMBAR SPINE W/DYE: CPT

## 2022-11-20 PROCEDURE — 85652 RBC SED RATE AUTOMATED: CPT

## 2022-11-20 PROCEDURE — A9576 INJ PROHANCE MULTIPACK: HCPCS | Performed by: EMERGENCY MEDICINE

## 2022-11-20 PROCEDURE — 83605 ASSAY OF LACTIC ACID: CPT

## 2022-11-20 PROCEDURE — 85025 COMPLETE CBC W/AUTO DIFF WBC: CPT

## 2022-11-20 PROCEDURE — 96375 TX/PRO/DX INJ NEW DRUG ADDON: CPT

## 2022-11-20 PROCEDURE — 99284 EMERGENCY DEPT VISIT MOD MDM: CPT

## 2022-11-20 PROCEDURE — 700111 HCHG RX REV CODE 636 W/ 250 OVERRIDE (IP): Performed by: EMERGENCY MEDICINE

## 2022-11-20 PROCEDURE — 80053 COMPREHEN METABOLIC PANEL: CPT

## 2022-11-20 PROCEDURE — 96374 THER/PROPH/DIAG INJ IV PUSH: CPT

## 2022-11-20 RX ORDER — ONDANSETRON 2 MG/ML
4 INJECTION INTRAMUSCULAR; INTRAVENOUS ONCE
Status: COMPLETED | OUTPATIENT
Start: 2022-11-20 | End: 2022-11-20

## 2022-11-20 RX ORDER — HYDROCODONE BITARTRATE AND ACETAMINOPHEN 5; 325 MG/1; MG/1
1 TABLET ORAL EVERY 4 HOURS PRN
Qty: 20 TABLET | Refills: 0 | Status: SHIPPED | OUTPATIENT
Start: 2022-11-20 | End: 2022-11-23

## 2022-11-20 RX ORDER — METHOCARBAMOL 500 MG/1
500-1000 TABLET, FILM COATED ORAL 2 TIMES DAILY
Status: SHIPPED | COMMUNITY
End: 2023-06-07

## 2022-11-20 RX ORDER — LIDOCAINE 50 MG/G
1 PATCH TOPICAL PRN
Status: SHIPPED | COMMUNITY
End: 2023-01-15

## 2022-11-20 RX ORDER — HYDROMORPHONE HYDROCHLORIDE 1 MG/ML
1 INJECTION, SOLUTION INTRAMUSCULAR; INTRAVENOUS; SUBCUTANEOUS ONCE
Status: COMPLETED | OUTPATIENT
Start: 2022-11-20 | End: 2022-11-20

## 2022-11-20 RX ORDER — ACETAMINOPHEN 500 MG
1000 TABLET ORAL EVERY 6 HOURS PRN
COMMUNITY

## 2022-11-20 RX ADMIN — HYDROMORPHONE HYDROCHLORIDE 1 MG: 1 INJECTION, SOLUTION INTRAMUSCULAR; INTRAVENOUS; SUBCUTANEOUS at 09:36

## 2022-11-20 RX ADMIN — ONDANSETRON 4 MG: 2 INJECTION INTRAMUSCULAR; INTRAVENOUS at 09:36

## 2022-11-20 RX ADMIN — GADOTERIDOL 10 ML: 279.3 INJECTION, SOLUTION INTRAVENOUS at 12:13

## 2022-11-20 ASSESSMENT — FIBROSIS 4 INDEX: FIB4 SCORE: 0.58

## 2022-11-20 ASSESSMENT — PAIN DESCRIPTION - PAIN TYPE: TYPE: ACUTE PAIN

## 2022-11-20 NOTE — ED PROVIDER NOTES
ED Provider Note      CHIEF COMPLAINT  Chief Complaint   Patient presents with    Hip Pain     R hip and leg pain       HPI  Yonis Ward is a 52 y.o. male who presents with back pain right hip and right leg pain.  Patient has a history of recently diagnosed L4-L5 discitis with paravertebral phlegmon.  Culture was positive for MRSA.  Patient is receiving daptomycin via PICC line.  He was hospitalized for initial work-up and required repeat hospitalization for uncontrolled pain at the beginning of this month.  He states his pain seems to have worsened over the last 4 days or so.  Located in the lower lumbar region.  Now radiates to the right down into the right hip and the back of the right leg.  A burning sensation that is severe.  Is been keeping him up at night.  The pain is worse when he tries to stand.  He cannot find a comfortable position.  He has not had a fever that he is aware of.  No bowel or bladder dysfunction or saddle anesthesia.    Medical record reviewed.  MRI 11/1 demonstrated L4 and L5 osteomyelitis and discitis.  Prevertebral and epidural phlegmon at the same level.  Patient was seen by his primary doctor 11/15 for pain.  He was given a shot of Toradol and a prescription for Mobic.  Primary doctor was awaiting urine drug screen to prescribe any controlled substances.  There is a note from infectious disease noting Patient message 11/16 regarding pain control.      REVIEW OF SYSTEMS  Denies any weakness in the lower extremities. No bowel or bladder incontinence or saddle anesthesia. No fevers or chills. No injection drug use or IV drug abuse. No abdominal pain, nausea or vomiting. No dysuria, hematuria or flank pain.    PAST MEDICAL HISTORY  History reviewed. No pertinent past medical history.    FAMILY HISTORY  Family History   Problem Relation Age of Onset    Hypertension Mother     Heart Disease Father        SOCIAL HISTORY  Social History     Tobacco Use    Smoking status: Former      "Types: Cigarettes     Quit date: 2018     Years since quittin.1    Smokeless tobacco: Never   Vaping Use    Vaping Use: Never used   Substance Use Topics    Alcohol use: Not Currently     Comment: occ    Drug use: Never       SURGICAL HISTORY  History reviewed. No pertinent surgical history.    CURRENT MEDICATIONS  Home Medications    **Home medications have not yet been reviewed for this encounter**         ALLERGIES  No Known Allergies      PHYSICAL EXAM  VITAL SIGNS: BP (!) 173/110   Pulse 98   Temp 36.7 °C (98 °F) (Temporal)   Resp 20   Ht 1.676 m (5' 6\")   Wt 53.1 kg (117 lb 1 oz)   SpO2 99%   BMI 18.89 kg/m²   Constitutional: Well developed, Well nourished, No acute distress, Non-toxic appearance. Complaining of pain  Neck: Grossly normal range of motion  Cardiovascular: Normal heart rate   Thorax & Lungs: No respiratory distress  Abdomen: Bowel sounds normal, soft, non-distended, nontender, no masses.  Skin: Warm, Dry, No rash.   Back: Diffuse lumbar tenderness, no step-off or deformity  Extremities: No clubbing, cyanosis, edema, no Homans or cords   Neurologic: Grossly normal cranial nerves, 5 out of 5 EHL, FHL, gastrocnemius, tibialis anterior. 2+ DTRs at the patellas bilaterally.  Normal sensory throughout      RADIOLOGY/PROCEDURES  MR-LUMBAR SPINE-WITH   Final Result      1.  Apparent interval improvement in L4-5 discitis ostia myelitis with decreased rim-enhancing fluid in the L4-5 disc space and slight improvement of the ventral epidural phlegmon with partial interval resolution of the previously demonstrated mild L4-5    central canal stenosis.   2.  Other findings appear unchanged.        Imaging is interpreted by radiologist     Pertinent Labs:   Laboratory data ordered and reviewed, please see chart    COURSE & MEDICAL DECISION MAKING    Patient presents with ongoing severe back pain with now right-sided radicular symptoms.  He is neurologically intact.  He has a history of discitis " with epidural phlegmon.  This required further exploration.  Order MRI and laboratory data.  Treated symptoms while in the ED.    Patient's data returned reassuring.  Laboratory data is normal.  He has noted improvement of discitis osteomyelitis.  At this point it appears continuation of current treatment is all that is required.  A prescription for few Norco tablets with precautions.  He will need to see pain management and his doctor for additional analgesic      FINAL IMPRESSION  1.  Lumbar osteomyelitis/discitis with phlegmon-improving        This dictation was created using voice recognition software. The accuracy of the dictation is limited to the abilities of the software.  The nursing notes were reviewed and certain aspects of this information were incorporated into this note.    Electronically signed by: Son Cruz M.D., 11/20/2022 9:38 AM

## 2022-11-20 NOTE — ED NOTES
Pt has a pick in right arm for infection in back. Pt has 10/10 pain in right hip through the leg. Shooting pain in right calf.

## 2022-11-20 NOTE — ED NOTES
IV established with blood draw. Specimens to lab. Medicated for pain. Lab called and they have urine for drug screen. One set of blood cultures also sent to lab.

## 2022-11-20 NOTE — ED NOTES
Med rec updated and complete, per pt   Allergies reviewed, per pt  Pt reports that he is on DAPTOMYCIN since 10/24/2022, pt thinks he has 3 weeks left

## 2022-11-20 NOTE — ED NOTES
Patient doesn't have an implant. He does have tattoos. I marked implant by mistake on MRI screening.

## 2022-11-21 ENCOUNTER — OUTPATIENT INFUSION SERVICES (OUTPATIENT)
Dept: ONCOLOGY | Facility: MEDICAL CENTER | Age: 52
End: 2022-11-21
Attending: INTERNAL MEDICINE
Payer: COMMERCIAL

## 2022-11-21 ENCOUNTER — HOSPITAL ENCOUNTER (OUTPATIENT)
Facility: MEDICAL CENTER | Age: 52
End: 2022-11-21
Attending: INTERNAL MEDICINE
Payer: COMMERCIAL

## 2022-11-21 ENCOUNTER — APPOINTMENT (OUTPATIENT)
Dept: LAB | Facility: MEDICAL CENTER | Age: 52
End: 2022-11-21
Attending: INTERNAL MEDICINE
Payer: COMMERCIAL

## 2022-11-21 VITALS
HEART RATE: 87 BPM | OXYGEN SATURATION: 100 % | DIASTOLIC BLOOD PRESSURE: 84 MMHG | SYSTOLIC BLOOD PRESSURE: 141 MMHG | RESPIRATION RATE: 18 BRPM | TEMPERATURE: 97.2 F

## 2022-11-21 DIAGNOSIS — M46.20 OSTEOMYELITIS OF VERTEBRA (HCC): ICD-10-CM

## 2022-11-21 LAB
CK SERPL-CCNC: 27 U/L (ref 0–154)
CRP SERPL HS-MCNC: 0.38 MG/DL (ref 0–0.75)
ERYTHROCYTE [SEDIMENTATION RATE] IN BLOOD BY WESTERGREN METHOD: 11 MM/HOUR (ref 0–20)

## 2022-11-21 PROCEDURE — 85652 RBC SED RATE AUTOMATED: CPT

## 2022-11-21 PROCEDURE — 82550 ASSAY OF CK (CPK): CPT

## 2022-11-21 PROCEDURE — 86140 C-REACTIVE PROTEIN: CPT

## 2022-11-21 PROCEDURE — 36592 COLLECT BLOOD FROM PICC: CPT

## 2022-11-21 RX ORDER — HEPARIN SODIUM (PORCINE) LOCK FLUSH IV SOLN 100 UNIT/ML 100 UNIT/ML
500 SOLUTION INTRAVENOUS PRN
Status: CANCELLED | OUTPATIENT
Start: 2022-11-28

## 2022-11-21 RX ORDER — 0.9 % SODIUM CHLORIDE 0.9 %
3 VIAL (ML) INJECTION PRN
Status: CANCELLED | OUTPATIENT
Start: 2022-11-28

## 2022-11-21 RX ORDER — 0.9 % SODIUM CHLORIDE 0.9 %
10 VIAL (ML) INJECTION PRN
Status: CANCELLED | OUTPATIENT
Start: 2022-11-28

## 2022-11-21 RX ORDER — 0.9 % SODIUM CHLORIDE 0.9 %
VIAL (ML) INJECTION PRN
Status: CANCELLED | OUTPATIENT
Start: 2022-11-28

## 2022-11-22 DIAGNOSIS — M46.20 OSTEOMYELITIS OF VERTEBRA (HCC): ICD-10-CM

## 2022-11-22 NOTE — PROGRESS NOTES
Patient to [facility] for labs and PICC line dressing change. PICC line flushed with + blood return, labs drawn. PICC line dressing changed with sterile field, clave changed. Patient has future appointment. Patient to home in care of self.

## 2022-11-28 ENCOUNTER — OUTPATIENT INFUSION SERVICES (OUTPATIENT)
Dept: ONCOLOGY | Facility: MEDICAL CENTER | Age: 52
End: 2022-11-28
Attending: INTERNAL MEDICINE
Payer: COMMERCIAL

## 2022-11-28 VITALS
OXYGEN SATURATION: 98 % | HEART RATE: 92 BPM | RESPIRATION RATE: 16 BRPM | TEMPERATURE: 97.7 F | SYSTOLIC BLOOD PRESSURE: 137 MMHG | DIASTOLIC BLOOD PRESSURE: 87 MMHG

## 2022-11-28 DIAGNOSIS — M46.20 OSTEOMYELITIS OF VERTEBRA (HCC): ICD-10-CM

## 2022-11-28 LAB
ALBUMIN SERPL BCP-MCNC: 4.1 G/DL (ref 3.2–4.9)
ALBUMIN/GLOB SERPL: 1.4 G/DL
ALP SERPL-CCNC: 77 U/L (ref 30–99)
ALT SERPL-CCNC: 12 U/L (ref 2–50)
ANION GAP SERPL CALC-SCNC: 11 MMOL/L (ref 7–16)
AST SERPL-CCNC: 15 U/L (ref 12–45)
BASOPHILS # BLD AUTO: 0.5 % (ref 0–1.8)
BASOPHILS # BLD: 0.04 K/UL (ref 0–0.12)
BILIRUB SERPL-MCNC: 0.3 MG/DL (ref 0.1–1.5)
BUN SERPL-MCNC: 17 MG/DL (ref 8–22)
CALCIUM SERPL-MCNC: 9.3 MG/DL (ref 8.5–10.5)
CHLORIDE SERPL-SCNC: 101 MMOL/L (ref 96–112)
CK SERPL-CCNC: 61 U/L (ref 0–154)
CO2 SERPL-SCNC: 23 MMOL/L (ref 20–33)
CREAT SERPL-MCNC: 0.52 MG/DL (ref 0.5–1.4)
EOSINOPHIL # BLD AUTO: 0.14 K/UL (ref 0–0.51)
EOSINOPHIL NFR BLD: 1.7 % (ref 0–6.9)
ERYTHROCYTE [DISTWIDTH] IN BLOOD BY AUTOMATED COUNT: 47.6 FL (ref 35.9–50)
GFR SERPLBLD CREATININE-BSD FMLA CKD-EPI: 122 ML/MIN/1.73 M 2
GLOBULIN SER CALC-MCNC: 3 G/DL (ref 1.9–3.5)
GLUCOSE SERPL-MCNC: 115 MG/DL (ref 65–99)
HCT VFR BLD AUTO: 38.8 % (ref 42–52)
HGB BLD-MCNC: 12.6 G/DL (ref 14–18)
IMM GRANULOCYTES # BLD AUTO: 0.04 K/UL (ref 0–0.11)
IMM GRANULOCYTES NFR BLD AUTO: 0.5 % (ref 0–0.9)
LYMPHOCYTES # BLD AUTO: 1.74 K/UL (ref 1–4.8)
LYMPHOCYTES NFR BLD: 20.9 % (ref 22–41)
MCH RBC QN AUTO: 29.5 PG (ref 27–33)
MCHC RBC AUTO-ENTMCNC: 32.5 G/DL (ref 33.7–35.3)
MCV RBC AUTO: 90.9 FL (ref 81.4–97.8)
MONOCYTES # BLD AUTO: 0.7 K/UL (ref 0–0.85)
MONOCYTES NFR BLD AUTO: 8.4 % (ref 0–13.4)
NEUTROPHILS # BLD AUTO: 5.68 K/UL (ref 1.82–7.42)
NEUTROPHILS NFR BLD: 68 % (ref 44–72)
NRBC # BLD AUTO: 0 K/UL
NRBC BLD-RTO: 0 /100 WBC
OUTPT INFUS CBC COMMENT OICOM: ABNORMAL
PLATELET # BLD AUTO: 345 K/UL (ref 164–446)
PMV BLD AUTO: 8.3 FL (ref 9–12.9)
POTASSIUM SERPL-SCNC: 3.6 MMOL/L (ref 3.6–5.5)
PROT SERPL-MCNC: 7.1 G/DL (ref 6–8.2)
RBC # BLD AUTO: 4.27 M/UL (ref 4.7–6.1)
SODIUM SERPL-SCNC: 135 MMOL/L (ref 135–145)
WBC # BLD AUTO: 8.3 K/UL (ref 4.8–10.8)

## 2022-11-28 PROCEDURE — 85025 COMPLETE CBC W/AUTO DIFF WBC: CPT

## 2022-11-28 PROCEDURE — 82550 ASSAY OF CK (CPK): CPT

## 2022-11-28 PROCEDURE — 36592 COLLECT BLOOD FROM PICC: CPT

## 2022-11-28 PROCEDURE — 80053 COMPREHEN METABOLIC PANEL: CPT

## 2022-11-28 RX ORDER — 0.9 % SODIUM CHLORIDE 0.9 %
3 VIAL (ML) INJECTION PRN
Status: CANCELLED | OUTPATIENT
Start: 2022-12-05

## 2022-11-28 RX ORDER — 0.9 % SODIUM CHLORIDE 0.9 %
10 VIAL (ML) INJECTION PRN
Status: CANCELLED | OUTPATIENT
Start: 2022-12-05

## 2022-11-28 RX ORDER — HEPARIN SODIUM (PORCINE) LOCK FLUSH IV SOLN 100 UNIT/ML 100 UNIT/ML
500 SOLUTION INTRAVENOUS PRN
Status: CANCELLED | OUTPATIENT
Start: 2022-12-05

## 2022-11-28 RX ORDER — 0.9 % SODIUM CHLORIDE 0.9 %
VIAL (ML) INJECTION PRN
Status: CANCELLED | OUTPATIENT
Start: 2022-12-05

## 2022-11-29 NOTE — PROGRESS NOTES
Pt presents to [facility] for labs and PICC care. R PICC flushed; brisk blood return observed and pt tolerated well. PICC dressing and IV connector changed per protocol. Labs drawn per orders. Brisk blood return observed from PICC before flushed and saline locked; wrapped in gauze and mesh dressing. Next appointment confirmed and education provided. Pt discharged to self care with all personal belongings and in NAD.

## 2022-11-30 NOTE — PROGRESS NOTES
Infectious Disease Follow up Note      Subjective:     Chief Complaint   Patient presents with    Follow-Up     Vertebral Osteo Myelitis       Interval History:   52 y.o. man with no prior medical history admitted on 10/19/2022 secondary to persistent and worsening lower back pain since August without any inciting events, trauma or injury.  MRI of the lumbar spine shows L4-L5 discitis, osteomyelitis and epidural and prevertebral phlegmon with moderate central canal stenosis.  But no well-defined fluid collection.  He was evaluated by neurosurgery who did not feel he was a surgical candidate given normal physical exam.  Patient is status post biopsy on 10/21.  Cultures grew MRSA.  Patient was discharged home on IV daptomycin 8 mg/kg daily to be completed on 12/2/2022 and a repeat MRI prior to completion of IV antibiotic course.    Post discharge, patient developed worsening back pain, right hip and right leg pain.  He was seen in the ED on 11/20/2022.  At that time he had a MRI of the lumbar spine which showed interval improvement in L4-L5 discitis, osteomyelitis with decreased rim-enhancing fluid in the L4-5 disc space and slight improvement of the ventral epidural phlegmon with partial interval resolution of the previously demonstrated mild L4-L5 central canal stenosis.    Hospital records reviewed.     Patient here for follow-up.  Patient states he has been tolerating IV daptomycin without any issues.  However he continues suffer from back pain and right hip pain which have been affecting his sleep.  He also complains of muscle cramping however his recent CPK is in within normal limits.  No recent fevers or chills.  He has been taking over-the-counter Tylenol and Aleve with minimal improvement.  When he was seen in the ED on 11/20 he was given a short course of narcotic pain medication.  He has been trying to get a prescription for narcotic pain medication through his primary care physician but has not heard back  "from the office despite a urine drug screen test.  Patient states that he has an appointment with Dr. Tinajero the first week of January for second opinion about surgery.    Review of Systems   Constitutional:  Negative for chills and fever.   Respiratory:  Negative for cough and shortness of breath.    Gastrointestinal:  Negative for abdominal pain, diarrhea, nausea and vomiting.   Musculoskeletal:  Positive for back pain and myalgias.        Right hip   Neurological:  Negative for dizziness and headaches.             Allergies: No Known Allergies      Medications:  Current Outpatient Medications on File Prior to Visit   Medication Sig Dispense Refill    lidocaine (LIDODERM) 5 % Patch Place 1 Patch on the skin as needed (Apply's on back).      methocarbamol (ROBAXIN) 500 MG Tab Take 1-2 Tablets by mouth 2 times a day. Pt takes 500MG in AM and 1000MG at night      acetaminophen (TYLENOL) 500 MG Tab Take 2 Tablets by mouth every 6 hours as needed for Moderate Pain.      amLODIPine (NORVASC) 5 MG Tab Take 1 Tablet by mouth every day for 90 days. 90 Tablet 0    meloxicam (MOBIC) 15 MG tablet Take 1 Tablet by mouth every day. 30 Tablet 2    NS SOLN 50 mL with DAPTOmycin 500 MG SOLR 420 mg Infuse 420 mg into a venous catheter every 24 hours. Pt started on 10/24/2022      ibuprofen (MOTRIN) 800 MG Tab Take 1 Tablet by mouth every 8 hours as needed for Moderate Pain. 30 Tablet 0     No current facility-administered medications on file prior to visit.         ROS  As documented above in my HPI       Objective:     PE:  /82 (BP Location: Left arm, Patient Position: Sitting, BP Cuff Size: Adult)   Pulse 84   Temp 36.4 °C (97.5 °F) (Temporal)   Ht 1.676 m (5' 6\")   Wt 54.4 kg (119 lb 14.9 oz)   SpO2 98%   BMI 19.36 kg/m²      Vital signs reviewed  Constitutional: patient is oriented to person, place, and time. Appears well-developed and well-nourished. No distress  Eyes: Conjunctivae normal and EOM are normal. Pupils " are equal, round, and reactive to light.   Mouth/Throat: Lips without lesions, good dentition, oropharynx is clear and moist.  Neck: Trachea midline. Normal range of motion. Neck supple. No masses  Cardiovascular: Normal rate, regular rhythm, normal heart sounds and intact distal pulses. No murmur, gallop, or friction rub. No edema.  Pulmonary/Chest: No respiratory distress. Unlabored respiratory effort, lungs clear to auscultation. No wheezes or rales.   Abdominal: Soft, non tender. BS + x 4. No masses or hepatosplenomegaly.   Musculoskeletal: Normal range of motion. No tenderness, swelling, erythema, deformity noted.  Lumbar spinal tenderness to palpation.  Right upper extremity PICC line in place-nontender, no surrounding erythema.  Neurological: alert and oriented to person, place, and time. No cranial nerve deficit. Coordination normal. Moves all extremities  Skin: Skin is warm and dry. Good turgor. No rashes visable.  Psychiatric: Normal mood and affect. Behavior is normal.     LABS:  WBC   Date/Time Value Ref Range Status   11/28/2022 06:00 PM 8.3 4.8 - 10.8 K/uL Final     RBC   Date/Time Value Ref Range Status   11/28/2022 06:00 PM 4.27 (L) 4.70 - 6.10 M/uL Final     Hemoglobin   Date/Time Value Ref Range Status   11/28/2022 06:00 PM 12.6 (L) 14.0 - 18.0 g/dL Final     Hematocrit   Date/Time Value Ref Range Status   11/28/2022 06:00 PM 38.8 (L) 42.0 - 52.0 % Final     MCV   Date/Time Value Ref Range Status   11/28/2022 06:00 PM 90.9 81.4 - 97.8 fL Final     MCH   Date/Time Value Ref Range Status   11/28/2022 06:00 PM 29.5 27.0 - 33.0 pg Final     MCHC   Date/Time Value Ref Range Status   11/28/2022 06:00 PM 32.5 (L) 33.7 - 35.3 g/dL Final     MPV   Date/Time Value Ref Range Status   11/28/2022 06:00 PM 8.3 (L) 9.0 - 12.9 fL Final        Sodium   Date/Time Value Ref Range Status   11/28/2022 06:00  135 - 145 mmol/L Final     Potassium   Date/Time Value Ref Range Status   11/28/2022 06:00 PM 3.6 3.6 -  5.5 mmol/L Final     Chloride   Date/Time Value Ref Range Status   11/28/2022 06:00  96 - 112 mmol/L Final     Co2   Date/Time Value Ref Range Status   11/28/2022 06:00 PM 23 20 - 33 mmol/L Final     Glucose   Date/Time Value Ref Range Status   11/28/2022 06:00  (H) 65 - 99 mg/dL Final     Bun   Date/Time Value Ref Range Status   11/28/2022 06:00 PM 17 8 - 22 mg/dL Final     Creatinine   Date/Time Value Ref Range Status   11/28/2022 06:00 PM 0.52 0.50 - 1.40 mg/dL Final     Alkaline Phosphatase   Date/Time Value Ref Range Status   11/28/2022 06:00 PM 77 30 - 99 U/L Final     AST(SGOT)   Date/Time Value Ref Range Status   11/28/2022 06:00 PM 15 12 - 45 U/L Final     ALT(SGPT)   Date/Time Value Ref Range Status   11/28/2022 06:00 PM 12 2 - 50 U/L Final     Total Bilirubin   Date/Time Value Ref Range Status   11/28/2022 06:00 PM 0.3 0.1 - 1.5 mg/dL Final        CPK Total   Date/Time Value Ref Range Status   11/28/2022 06:00 PM 61 0 - 154 U/L Final        MICRO:  No results found for: BLOODCULTU, BLDCULT, BCHOLD       IMAGING STUDIES:  MRI of the lumbar spine on 11/20/2022  IMPRESSION:     1.  Apparent interval improvement in L4-5 discitis ostia myelitis with decreased rim-enhancing fluid in the L4-5 disc space and slight improvement of the ventral epidural phlegmon with partial interval resolution of the previously demonstrated mild L4-5   central canal stenosis.  Assessment/Plan:     Problem List Items Addressed This Visit       Osteomyelitis of vertebra, epidural abscess (HCC)     Other Visit Diagnoses       MRSA infection              Patient continues to have back pain and right hip pain.  Suspect his right hip pain is referred pain from his infection.  Recent labs reviewed and are unremarkable.  MRI on 11/20 however shows interval improvement.    -Continue IV daptomycin 8 mg/kg daily through 12/2/2022  -Discontinue PICC line after last dose of IV antibiotics  -Transition to oral bactrim 1 DS tab BID   for an additional 3 months  -Encourage hydration while taking Bactrim  -Repeat CMP in 2 to 3 weeks when patient is on Bactrim  -Patient instructed to notify our office if he develops any side effects of Bactrim as there are other oral antibiotic options  -Repeat MRI lumbar spine prior to next appointment    Follow up:  months, RTC sooner if needed. FU with PCP for ongoing chronic medical conditions.     Isabel Moreno M.D.      Please note that this dictation was created using voice recognition software. I have worked with technical experts from [employer] to optimize the interface.  I have made every reasonable attempt to correct obvious errors, but there may be errors of grammar and possibly content that I did not discover before finalizing the note.

## 2022-12-01 ENCOUNTER — OFFICE VISIT (OUTPATIENT)
Dept: INFECTIOUS DISEASES | Facility: MEDICAL CENTER | Age: 52
End: 2022-12-01
Attending: INTERNAL MEDICINE
Payer: COMMERCIAL

## 2022-12-01 VITALS
BODY MASS INDEX: 19.27 KG/M2 | OXYGEN SATURATION: 98 % | HEART RATE: 84 BPM | DIASTOLIC BLOOD PRESSURE: 82 MMHG | SYSTOLIC BLOOD PRESSURE: 110 MMHG | WEIGHT: 119.93 LBS | TEMPERATURE: 97.5 F | HEIGHT: 66 IN

## 2022-12-01 DIAGNOSIS — M46.20 OSTEOMYELITIS OF VERTEBRA (HCC): ICD-10-CM

## 2022-12-01 DIAGNOSIS — A49.02 MRSA INFECTION: ICD-10-CM

## 2022-12-01 PROCEDURE — 99211 OFF/OP EST MAY X REQ PHY/QHP: CPT | Performed by: INTERNAL MEDICINE

## 2022-12-01 PROCEDURE — 99214 OFFICE O/P EST MOD 30 MIN: CPT | Performed by: INTERNAL MEDICINE

## 2022-12-01 RX ORDER — SULFAMETHOXAZOLE AND TRIMETHOPRIM 800; 160 MG/1; MG/1
1 TABLET ORAL 2 TIMES DAILY
Qty: 180 TABLET | Refills: 0 | Status: SHIPPED | OUTPATIENT
Start: 2022-12-01 | End: 2023-01-23

## 2022-12-01 ASSESSMENT — ENCOUNTER SYMPTOMS
BACK PAIN: 1
COUGH: 0
CHILLS: 0
VOMITING: 0
HEADACHES: 0
DIARRHEA: 0
DIZZINESS: 0
SHORTNESS OF BREATH: 0
ABDOMINAL PAIN: 0
FEVER: 0
MYALGIAS: 1
NAUSEA: 0

## 2022-12-01 ASSESSMENT — FIBROSIS 4 INDEX: FIB4 SCORE: 0.63

## 2022-12-05 ENCOUNTER — OUTPATIENT INFUSION SERVICES (OUTPATIENT)
Dept: ONCOLOGY | Facility: MEDICAL CENTER | Age: 52
End: 2022-12-05
Attending: INTERNAL MEDICINE
Payer: COMMERCIAL

## 2022-12-05 VITALS
HEART RATE: 85 BPM | TEMPERATURE: 98.2 F | DIASTOLIC BLOOD PRESSURE: 80 MMHG | OXYGEN SATURATION: 97 % | WEIGHT: 127.43 LBS | BODY MASS INDEX: 20.57 KG/M2 | RESPIRATION RATE: 18 BRPM | SYSTOLIC BLOOD PRESSURE: 116 MMHG

## 2022-12-05 DIAGNOSIS — M46.20 OSTEOMYELITIS OF VERTEBRA (HCC): ICD-10-CM

## 2022-12-05 LAB
MYCOBACTERIUM SPEC CULT: NORMAL
MYCOBACTERIUM SPEC CULT: NORMAL
RHODAMINE-AURAMINE STN SPEC: NORMAL
RHODAMINE-AURAMINE STN SPEC: NORMAL
SIGNIFICANT IND 70042: NORMAL
SIGNIFICANT IND 70042: NORMAL
SITE SITE: NORMAL
SITE SITE: NORMAL
SOURCE SOURCE: NORMAL
SOURCE SOURCE: NORMAL

## 2022-12-05 RX ORDER — 0.9 % SODIUM CHLORIDE 0.9 %
VIAL (ML) INJECTION PRN
Status: CANCELLED | OUTPATIENT
Start: 2022-12-12

## 2022-12-05 RX ORDER — 0.9 % SODIUM CHLORIDE 0.9 %
10 VIAL (ML) INJECTION PRN
Status: CANCELLED | OUTPATIENT
Start: 2022-12-12

## 2022-12-05 RX ORDER — HEPARIN SODIUM (PORCINE) LOCK FLUSH IV SOLN 100 UNIT/ML 100 UNIT/ML
500 SOLUTION INTRAVENOUS PRN
Status: CANCELLED | OUTPATIENT
Start: 2022-12-12

## 2022-12-05 RX ORDER — 0.9 % SODIUM CHLORIDE 0.9 %
3 VIAL (ML) INJECTION PRN
Status: CANCELLED | OUTPATIENT
Start: 2022-12-12

## 2022-12-05 ASSESSMENT — FIBROSIS 4 INDEX: FIB4 SCORE: 0.63

## 2022-12-06 NOTE — PROGRESS NOTES
Pt presents to [facility] for PICC removal after finishing his IV antibiotic treatment. R PICC removed per protocol and observed for 30 minutes with no s/s of adverse reactions. Pt educated on aftercare and given handout; pt verbalized understanding. Pt discharged to self care with all personal belongings and in NAD.

## 2022-12-11 ENCOUNTER — APPOINTMENT (OUTPATIENT)
Dept: RADIOLOGY | Facility: MEDICAL CENTER | Age: 52
End: 2022-12-11
Attending: INTERNAL MEDICINE
Payer: COMMERCIAL

## 2023-01-15 ENCOUNTER — OFFICE VISIT (OUTPATIENT)
Dept: URGENT CARE | Facility: PHYSICIAN GROUP | Age: 53
End: 2023-01-15
Payer: COMMERCIAL

## 2023-01-15 VITALS
SYSTOLIC BLOOD PRESSURE: 100 MMHG | DIASTOLIC BLOOD PRESSURE: 58 MMHG | OXYGEN SATURATION: 98 % | WEIGHT: 130 LBS | RESPIRATION RATE: 16 BRPM | HEART RATE: 80 BPM | BODY MASS INDEX: 20.89 KG/M2 | HEIGHT: 66 IN | TEMPERATURE: 98.6 F

## 2023-01-15 DIAGNOSIS — H57.89 EYE SWELLING: ICD-10-CM

## 2023-01-15 DIAGNOSIS — L50.9 URTICARIAL RASH: ICD-10-CM

## 2023-01-15 PROCEDURE — 99213 OFFICE O/P EST LOW 20 MIN: CPT | Performed by: PHYSICIAN ASSISTANT

## 2023-01-15 RX ORDER — METHYLPREDNISOLONE 4 MG/1
TABLET ORAL
Qty: 21 TABLET | Refills: 0 | Status: SHIPPED | OUTPATIENT
Start: 2023-01-15 | End: 2023-06-07

## 2023-01-15 ASSESSMENT — ENCOUNTER SYMPTOMS
SHORTNESS OF BREATH: 0
NAUSEA: 0
STRIDOR: 0
SORE THROAT: 0
CHILLS: 0
FEVER: 0
VOMITING: 0
HEADACHES: 0
COUGH: 0
DIZZINESS: 0

## 2023-01-15 ASSESSMENT — FIBROSIS 4 INDEX: FIB4 SCORE: 0.63

## 2023-01-15 NOTE — PROGRESS NOTES
Subjective     Efrain Ward is a 52 y.o. male who presents with Rash (Facial rash and patches on hands./Itching, redness, swelling./Onset 1 week)    HPI:  Yonis Ward is a 52 y.o. male who presents today for evaluation of rash and eye swelling.  Patient reports that he has been feeling itchy over his entire body for the past week and has noticed red splotches on his hands and arms and says that his face has been bright red this past week.  He has tried using an oral antihistamine without much relief and notes that each morning he seems to wake up he gets a little bit worse and his eyes have been swollen.  He has not had any feelings of throat tightness, sore throat, cough, or shortness of breath.  He has been taking an oral course of Bactrim since December 1 secondary to vertebral osteomyelitis and epidural abscess.  He is still being followed by infectious disease for this.  He also notes that he started taking amlodipine in November.  Otherwise, he denies the use of any new lotions, soaps, detergents.  Says that he does have reactions to vinegar in the past but has been completely avoiding any products that have any potential for vinegar over the past week and symptoms are not resolving.      Review of Systems   Constitutional:  Negative for chills and fever.   HENT:  Negative for sore throat.    Eyes:         Eye swelling   Respiratory:  Negative for cough, shortness of breath and stridor.    Gastrointestinal:  Negative for nausea and vomiting.   Skin:  Positive for itching and rash.   Neurological:  Negative for dizziness and headaches.         PMH:  has no past medical history on file.  MEDS:   Current Outpatient Medications:     methylPREDNISolone (MEDROL DOSEPAK) 4 MG Tablet Therapy Pack, Follow schedule on package instructions., Disp: 21 Tablet, Rfl: 0    sulfamethoxazole-trimethoprim (BACTRIM DS) 800-160 MG tablet, Take 1 Tablet by mouth 2 times a day for 90 days., Disp: 180 Tablet, Rfl:  "0    methocarbamol (ROBAXIN) 500 MG Tab, Take 1-2 Tablets by mouth 2 times a day. Pt takes 500MG in AM and 1000MG at night, Disp: , Rfl:     acetaminophen (TYLENOL) 500 MG Tab, Take 2 Tablets by mouth every 6 hours as needed for Moderate Pain., Disp: , Rfl:     amLODIPine (NORVASC) 5 MG Tab, Take 1 Tablet by mouth every day for 90 days., Disp: 90 Tablet, Rfl: 0    lidocaine (LIDODERM) 5 % Patch, Place 1 Patch on the skin as needed (Apply's on back). (Patient not taking: Reported on 12/1/2022), Disp: , Rfl:     meloxicam (MOBIC) 15 MG tablet, Take 1 Tablet by mouth every day., Disp: 30 Tablet, Rfl: 2    NS SOLN 50 mL with DAPTOmycin 500 MG SOLR 420 mg, Infuse 420 mg into a venous catheter every 24 hours. Pt started on 10/24/2022 (Patient not taking: Reported on 1/15/2023), Disp: , Rfl:     ibuprofen (MOTRIN) 800 MG Tab, Take 1 Tablet by mouth every 8 hours as needed for Moderate Pain. (Patient not taking: Reported on 12/1/2022), Disp: 30 Tablet, Rfl: 0  ALLERGIES: No Known Allergies  SURGHX: History reviewed. No pertinent surgical history.  SOCHX:  reports that he quit smoking about 4 years ago. His smoking use included cigarettes. He has never used smokeless tobacco. He reports that he does not currently use alcohol. He reports that he does not use drugs.  FH: Family history was reviewed, no pertinent findings to report      Objective     /58 (BP Location: Right arm, Patient Position: Sitting, BP Cuff Size: Adult)   Pulse 80   Temp 37 °C (98.6 °F) (Temporal)   Resp 16   Ht 1.676 m (5' 6\")   Wt 59 kg (130 lb)   SpO2 98%   BMI 20.98 kg/m²      Physical Exam  Constitutional:       General: He is not in acute distress.     Appearance: He is not diaphoretic.   HENT:      Head: Normocephalic and atraumatic.      Right Ear: External ear normal.      Left Ear: External ear normal.   Eyes:      Conjunctiva/sclera: Conjunctivae normal.      Pupils: Pupils are equal, round, and reactive to light.   Pulmonary:      " Effort: Pulmonary effort is normal. No respiratory distress.   Musculoskeletal:      Cervical back: Normal range of motion.   Skin:     Findings: Rash present.      Comments: Diffuse macular papular lesions noted on hands, upper extremities, back, and chest.  Entire face is diffusely erythematous with some mild bilateral eye swelling noted.  The rash does luis with pressure.  No vesicular lesions or signs of secondary bacterial skin infection.   Neurological:      Mental Status: He is alert and oriented to person, place, and time.   Psychiatric:         Mood and Affect: Mood and affect normal.         Cognition and Memory: Memory normal.         Judgment: Judgment normal.           Assessment & Plan       1. Eye swelling  - methylPREDNISolone (MEDROL DOSEPAK) 4 MG Tablet Therapy Pack; Follow schedule on package instructions.  Dispense: 21 Tablet; Refill: 0    2. Urticarial rash  - methylPREDNISolone (MEDROL DOSEPAK) 4 MG Tablet Therapy Pack; Follow schedule on package instructions.  Dispense: 21 Tablet; Refill: 0    Patient appears to be having allergic reaction to something.  Unclear what the current trigger is.  Recommend use of an oral antihistamine such as Zyrtec or Claritin once or twice daily.  Avoid heat, cool compresses can help.  Continue all prescriptions at this time but recommend contacting infectious disease on Tuesday to discuss the rash and see if they think it may be related to his antibiotic or not.          Differential Diagnosis, natural history, and supportive care discussed. Return to the Urgent Care or follow up with your PCP if symptoms fail to resolve, or for any new or worsening symptoms. Emergency room precautions discussed. Patient and/or family appears understanding of information.

## 2023-01-23 ENCOUNTER — TELEPHONE (OUTPATIENT)
Dept: INFECTIOUS DISEASES | Facility: MEDICAL CENTER | Age: 53
End: 2023-01-23
Payer: COMMERCIAL

## 2023-01-23 ENCOUNTER — DOCUMENTATION (OUTPATIENT)
Dept: INFECTIOUS DISEASES | Facility: MEDICAL CENTER | Age: 53
End: 2023-01-23
Payer: COMMERCIAL

## 2023-01-23 DIAGNOSIS — M46.20 OSTEOMYELITIS OF VERTEBRA (HCC): ICD-10-CM

## 2023-01-23 RX ORDER — DOXYCYCLINE 100 MG/1
100 CAPSULE ORAL 2 TIMES DAILY
Qty: 60 CAPSULE | Refills: 2 | Status: SHIPPED | OUTPATIENT
Start: 2023-01-23 | End: 2023-02-07 | Stop reason: SDUPTHER

## 2023-01-23 NOTE — TELEPHONE ENCOUNTER
Patient left message regarding possible reaction to current abx Bactrim. Patient did go to urgent care (note in epic) was given a medrol dose pk however is still having stomach issues. I left patient a message for a return call, please advise

## 2023-01-23 NOTE — PROGRESS NOTES
Patient reporting possible allergic reaction to Bactrim with eye swelling and rash.  He presented to urgent care and was given an Medrol Dosepak.     --- Stop Bactrim and transition to doxycycline 100 mg twice daily  --- Orders for doxycycline sent to pharmacy  --- We will ask MA to notify the patient of the plan, if he has any worsening swelling of his eyes, or lips, any shortness of breath or any other concerning symptoms come into the ER.    Sydni Gracia MD

## 2023-01-23 NOTE — TELEPHONE ENCOUNTER
Spoke with patient notified him that per Dr Gracia's note ,if he has any worsening swelling of his eyes, or lips, any shortness of breath or any other concerning symptoms come into the ER.

## 2023-01-23 NOTE — TELEPHONE ENCOUNTER
Sydni Gracia M.D.  You Just now (3:48 PM)     I put a note in the chart.       Sydni Gracia M.D.  You 1 minute ago (3:48 PM)     Nia,     Please call him and tell him to stop taking the Bactrim immediately.  He can transition to doxycycline 100 mg twice daily and I sent a prescription over to his pharmacy.     Thanks,     Sydni Gracia MD

## 2023-02-07 ENCOUNTER — OFFICE VISIT (OUTPATIENT)
Dept: INFECTIOUS DISEASES | Facility: MEDICAL CENTER | Age: 53
End: 2023-02-07
Attending: INTERNAL MEDICINE
Payer: COMMERCIAL

## 2023-02-07 VITALS
RESPIRATION RATE: 16 BRPM | TEMPERATURE: 98.9 F | DIASTOLIC BLOOD PRESSURE: 82 MMHG | HEIGHT: 66 IN | HEART RATE: 78 BPM | SYSTOLIC BLOOD PRESSURE: 122 MMHG | BODY MASS INDEX: 21.33 KG/M2 | OXYGEN SATURATION: 99 % | WEIGHT: 132.72 LBS

## 2023-02-07 DIAGNOSIS — L02.91 PHLEGMON: ICD-10-CM

## 2023-02-07 DIAGNOSIS — M54.9 INTRACTABLE BACK PAIN: ICD-10-CM

## 2023-02-07 DIAGNOSIS — Z88.1 ALLERGY STATUS TO OTHER ANTIBIOTIC AGENTS: ICD-10-CM

## 2023-02-07 DIAGNOSIS — G06.1 ABSCESS IN EPIDURAL SPACE OF LUMBAR SPINE: ICD-10-CM

## 2023-02-07 DIAGNOSIS — A49.02 MRSA INFECTION: ICD-10-CM

## 2023-02-07 DIAGNOSIS — M46.20 OSTEOMYELITIS OF VERTEBRA (HCC): ICD-10-CM

## 2023-02-07 PROCEDURE — 99213 OFFICE O/P EST LOW 20 MIN: CPT | Performed by: INTERNAL MEDICINE

## 2023-02-07 RX ORDER — DOXYCYCLINE 100 MG/1
100 CAPSULE ORAL 2 TIMES DAILY
Qty: 60 CAPSULE | Refills: 3 | Status: SHIPPED | OUTPATIENT
Start: 2023-02-07 | End: 2023-03-09

## 2023-02-07 ASSESSMENT — FIBROSIS 4 INDEX: FIB4 SCORE: 0.63

## 2023-02-07 NOTE — PROGRESS NOTES
[facility]    Subjective:     Chief Complaint   Patient presents with    Follow-Up     Osteomyelitis      Interval History:   52 y.o. man with no prior medical history admitted on 10/19/2022 secondary to persistent and worsening lower back pain since August without any inciting events, trauma or injury.  MRI of the lumbar spine shows L4-L5 discitis, osteomyelitis and epidural and prevertebral phlegmon with moderate central canal stenosis.  But no well-defined fluid collection.  He was evaluated by neurosurgery who did not feel he was a surgical candidate given normal physical exam.  Patient is status post biopsy on 10/21.  Cultures grew MRSA.  Patient was discharged home on IV daptomycin 8 mg/kg daily to be completed on 12/2/2022 and a repeat MRI prior to completion of IV antibiotic course.     Post discharge, patient developed worsening back pain, right hip and right leg pain.  He was seen in the ED on 11/20/2022.  At that time he had a MRI of the lumbar spine which showed interval improvement in L4-L5 discitis, osteomyelitis with decreased rim-enhancing fluid in the L4-5 disc space and slight improvement of the ventral epidural phlegmon with partial interval resolution of the previously demonstrated mild L4-L5 central canal stenosis.     Hospital records reviewed.      Patient here for follow-up.  Patient states he has been tolerating IV daptomycin without any issues.  However he continues suffer from back pain and right hip pain which have been affecting his sleep.  He also complains of muscle cramping however his recent CPK is in within normal limits.  No recent fevers or chills.  He has been taking over-the-counter Tylenol and Aleve with minimal improvement.  When he was seen in the ED on 11/20 he was given a short course of narcotic pain medication.  He has been trying to get a prescription for narcotic pain medication through his primary care physician but has not heard back from the office  "despite a urine drug screen test.  Patient states that he has an appointment with Dr. Tinajero the first week of January for second opinion about surgery.     Today, 2023: Patient reports feeling well and has been tolerating the doxycline without adverse effect.  Denies feeling generally ill, fevers/chills, general malaise, headache, n/v/d, abdominal pain, chest pain or shortness of breath.  He has ongoing lumbar back pain, improved overall but still significant.  Patient's rash, itching and facial edema resolved after stopping Bactrim.    ROS  As documented above in my HPI.    No past medical history on file.    Social History     Tobacco Use    Smoking status: Former     Types: Cigarettes     Quit date: 2018     Years since quittin.3    Smokeless tobacco: Never   Vaping Use    Vaping Use: Never used   Substance Use Topics    Alcohol use: Not Currently     Comment: occ    Drug use: Never       Allergies: Patient has no known allergies.    Pt's medication and problem list reviewed.     Objective:     PE:  /82 (BP Location: Right arm, Patient Position: Sitting, BP Cuff Size: Adult)   Pulse 78   Temp 37.2 °C (98.9 °F) (Temporal)   Resp 16   Ht 1.676 m (5' 6\")   Wt 60.2 kg (132 lb 11.5 oz)   SpO2 99%   BMI 21.42 kg/m²     Vital signs reviewed    Constitutional: Appears well-developed and well-nourished. No acute distress.  Speech fluent.    Eyes: Conjunctivae normal and EOM are normal. Pupils are equal, round, and reactive to light.   ENMT: Lips without lesions, good dentition.  Oropharynx is clear and moist.  Neck: Trachea midline. Normal range of motion. Neck supple. No masses.  No JVD.  Cardiovascular: Normal rate, regular rhythm, normal heart sounds and intact distal pulses. No murmur, gallop, or friction rub. No edema.  Respiratory: No respiratory distress, unlabored respiratory effort.  Lungs clear to auscultation bilaterally. No wheezes or rales.   Abdomen: Soft, non tender, " non-distended. BS + x 4. No masses or hepatosplenomegaly.   Musculoskeletal: Steady gait.  Normal range of motion.  Sensitivity in thoracic spine and mild tenderness to palpation of lumbar spine.    Skin: Warm and dry. Good turgor. No visible rashes or lesions.  Sunburn.  Neurological: No cranial nerve deficit. Coordination normal.  Sensation intact.  Psychiatric: Alert and oriented to person, place, and time. Normal mood, calm affect.  Normal behavior and judgment.     Labs:  WBC   Date/Time Value Ref Range Status   11/28/2022 06:00 PM 8.3 4.8 - 10.8 K/uL Final     RBC   Date/Time Value Ref Range Status   11/28/2022 06:00 PM 4.27 (L) 4.70 - 6.10 M/uL Final     Hemoglobin   Date/Time Value Ref Range Status   11/28/2022 06:00 PM 12.6 (L) 14.0 - 18.0 g/dL Final     Hematocrit   Date/Time Value Ref Range Status   11/28/2022 06:00 PM 38.8 (L) 42.0 - 52.0 % Final     MCV   Date/Time Value Ref Range Status   11/28/2022 06:00 PM 90.9 81.4 - 97.8 fL Final     MCH   Date/Time Value Ref Range Status   11/28/2022 06:00 PM 29.5 27.0 - 33.0 pg Final     MCHC   Date/Time Value Ref Range Status   11/28/2022 06:00 PM 32.5 (L) 33.7 - 35.3 g/dL Final     MPV   Date/Time Value Ref Range Status   11/28/2022 06:00 PM 8.3 (L) 9.0 - 12.9 fL Final        Sodium   Date/Time Value Ref Range Status   11/28/2022 06:00  135 - 145 mmol/L Final     Potassium   Date/Time Value Ref Range Status   11/28/2022 06:00 PM 3.6 3.6 - 5.5 mmol/L Final     Chloride   Date/Time Value Ref Range Status   11/28/2022 06:00  96 - 112 mmol/L Final     Co2   Date/Time Value Ref Range Status   11/28/2022 06:00 PM 23 20 - 33 mmol/L Final     Glucose   Date/Time Value Ref Range Status   11/28/2022 06:00  (H) 65 - 99 mg/dL Final     Bun   Date/Time Value Ref Range Status   11/28/2022 06:00 PM 17 8 - 22 mg/dL Final     Creatinine   Date/Time Value Ref Range Status   11/28/2022 06:00 PM 0.52 0.50 - 1.40 mg/dL Final       Alkaline Phosphatase   Date/Time  Value Ref Range Status   11/28/2022 06:00 PM 77 30 - 99 U/L Final     AST(SGOT)   Date/Time Value Ref Range Status   11/28/2022 06:00 PM 15 12 - 45 U/L Final     ALT(SGPT)   Date/Time Value Ref Range Status   11/28/2022 06:00 PM 12 2 - 50 U/L Final     Total Bilirubin   Date/Time Value Ref Range Status   11/28/2022 06:00 PM 0.3 0.1 - 1.5 mg/dL Final        CPK Total   Date/Time Value Ref Range Status   11/28/2022 06:00 PM 61 0 - 154 U/L Final        Assessment and Plan:   The following treatment plan was discussed with patient at length:    1. Osteomyelitis of vertebra, epidural abscess (HCC)  MR-LUMBAR SPINE-WITH & W/O    doxycycline (MONODOX) 100 MG capsule      2. MRSA infection        3. Abscess in epidural space of lumbar spine  MR-LUMBAR SPINE-WITH & W/O      4. Paravertebral phlegmon  MR-LUMBAR SPINE-WITH & W/O      5. Intractable back pain        6. Allergy status to other antibiotic agents          --- Patient had allergic reaction to Bactrim and was transitioned to doxycycline 100 mg twice daily which she appears to be tolerating well.  --- Repeat MRI lumbar spine today with some improvement in the L4-5 discitis/osteo with decrease in rim-enhancing fluid although still present and also slight decrease in epidural phlegmon.  --- MRI still with fluid/phlegmon and evolution, encouraging as it is improved but will continue doxycycline 100 mg twice daily for another 3 months and then repeat MRI  --- Doxycycline refills ordered  --- Repeat MRI ordered  --- Follow-up with spinal surgery  --- Discussed dosing and side effects of medications being prescribed.  Pt instructed to call clinic with any adverse effects or to discontinue the medication and go to the ER should difficulty breathing, SOB, CP, facial swelling and/or gross rash/itching occurs.  He is a  does have a slight sunburn likely associated with the doxycycline so recommended some precautions such as sunscreen or wearing hat and long  sleeves.  --- Patient had a significant reaction to Bactrim with rash, skin peeling, pruritus and eye swelling, will avoid Bactrim in the future    Follow up: 12 weeks, RTC sooner if needed. FU with PCP for ongoing chronic medical conditions.     Sydni Gracia M.D.       Please note that this dictation was created using voice recognition software. I have  worked with technical experts from [facility] to optimize the interface.  I have made every reasonable attempt to correct obvious errors, but there may be errors of grammar and possibly content that I did not discover before finalizing the note.

## 2023-03-23 DIAGNOSIS — I10 HYPERTENSION, UNSPECIFIED TYPE: ICD-10-CM

## 2023-03-23 RX ORDER — AMLODIPINE BESYLATE 5 MG/1
TABLET ORAL
Qty: 90 TABLET | Refills: 0 | Status: SHIPPED | OUTPATIENT
Start: 2023-03-23 | End: 2023-06-07

## 2023-03-23 NOTE — TELEPHONE ENCOUNTER
Requested Prescriptions     Pending Prescriptions Disp Refills   • amLODIPine (NORVASC) 5 MG Tab [Pharmacy Med Name: AMLODIPINE BESYLATE 5 MG TAB] 90 Tablet 0     Sig: TAKE 1 TABLET BY MOUTH EVERY DAY

## 2023-04-13 ENCOUNTER — TELEPHONE (OUTPATIENT)
Dept: HEALTH INFORMATION MANAGEMENT | Facility: OTHER | Age: 53
End: 2023-04-13
Payer: COMMERCIAL

## 2023-05-02 ENCOUNTER — TELEPHONE (OUTPATIENT)
Dept: INFECTIOUS DISEASES | Facility: MEDICAL CENTER | Age: 53
End: 2023-05-02
Payer: COMMERCIAL

## 2023-05-02 NOTE — TELEPHONE ENCOUNTER
Message    Appointment canceled for Yonis Ward ([id number])   Visit Type: FOLLOW UP VISIT   Date        Time      Length    Provider                  Department   5/10/2023    1:20 PM  20 mins.  Physician Sukumar Paz M.D. INFECTIOUS DISEASE [facility]      Reason for Cancellation: Cancelled via MyChart      Patient Comments: Cannot afford the MRI      Appointment Scheduled  (Newest Message First)  Team, [facility]  Yonis Ward 13 minutes ago (9:00 AM)     YT  Appointment Information:      Visit Type: Follow Up Visit          Date: 5/10/2023                  Dept: [facility]                  Provider: SUKUMAR PAZ                  Time: 1:20 PM                  Length: 20 min     Appt Status: Canceled         Cancel Reason: Cancelled via MyChart       This "[employer] message has not been read.

## 2023-05-07 ENCOUNTER — APPOINTMENT (OUTPATIENT)
Dept: RADIOLOGY | Facility: MEDICAL CENTER | Age: 53
End: 2023-05-07
Attending: INTERNAL MEDICINE
Payer: COMMERCIAL

## 2023-06-07 ENCOUNTER — OFFICE VISIT (OUTPATIENT)
Dept: MEDICAL GROUP | Facility: PHYSICIAN GROUP | Age: 53
End: 2023-06-07
Payer: COMMERCIAL

## 2023-06-07 VITALS
HEIGHT: 66 IN | BODY MASS INDEX: 21.38 KG/M2 | RESPIRATION RATE: 16 BRPM | TEMPERATURE: 98.5 F | OXYGEN SATURATION: 96 % | HEART RATE: 64 BPM | SYSTOLIC BLOOD PRESSURE: 126 MMHG | WEIGHT: 133 LBS | DIASTOLIC BLOOD PRESSURE: 74 MMHG

## 2023-06-07 DIAGNOSIS — Z00.00 WELLNESS EXAMINATION: ICD-10-CM

## 2023-06-07 PROCEDURE — 3078F DIAST BP <80 MM HG: CPT | Performed by: FAMILY MEDICINE

## 2023-06-07 PROCEDURE — 99396 PREV VISIT EST AGE 40-64: CPT | Performed by: FAMILY MEDICINE

## 2023-06-07 PROCEDURE — 3074F SYST BP LT 130 MM HG: CPT | Performed by: FAMILY MEDICINE

## 2023-06-07 RX ORDER — DULOXETIN HYDROCHLORIDE 30 MG/1
30 CAPSULE, DELAYED RELEASE ORAL DAILY
Qty: 30 CAPSULE | Refills: 1 | Status: SHIPPED | OUTPATIENT
Start: 2023-06-07

## 2023-06-07 RX ORDER — METHOCARBAMOL 500 MG/1
500-1000 TABLET, FILM COATED ORAL 2 TIMES DAILY
Qty: 120 TABLET | Refills: 1 | Status: SHIPPED | OUTPATIENT
Start: 2023-06-07 | End: 2023-10-17

## 2023-06-07 RX ORDER — DOXYCYCLINE 100 MG/1
100 CAPSULE ORAL 2 TIMES DAILY
COMMUNITY
Start: 2023-06-26 | End: 2023-11-25

## 2023-06-07 ASSESSMENT — PATIENT HEALTH QUESTIONNAIRE - PHQ9
CLINICAL INTERPRETATION OF PHQ2 SCORE: 3
SUM OF ALL RESPONSES TO PHQ QUESTIONS 1-9: 11
5. POOR APPETITE OR OVEREATING: 0 - NOT AT ALL

## 2023-06-07 ASSESSMENT — FIBROSIS 4 INDEX: FIB4 SCORE: 0.64

## 2023-06-07 NOTE — ASSESSMENT & PLAN NOTE
Patient is here for his wellness exam.  Also to go over all the events since last seen.  He developed discitis and osteomyelitis to the L4-5 area.  He has been on antibiotics since then.  He saw 1 neurosurgery clinic who recommended surgery but after he was cleared of infection.  Another 1 said he did not need surgery so his insurance has denied his ability to get surgery for this problem.  He still following up with infectious disease and is due to see them again soon to see if he is clear from his infection.  He is also fighting with into the insurance company as a refused to pay 2 days of his hospitalization stating he did not need to be in the hospital but that really was not up to the patient he was up to the doctor.  He is due to talk to them again today.  This is causing a lot of stress in his life.  He is asking for refill on his Robaxin as he gets muscle cramps at night when he is trying to sleep and that helps with it.  He knows not to use it during the daytime.  He does have some chronic low back pain that he just takes Tylenol for.  He does not want to use narcotics.  He has tried gabapentin but that caused worsening to his leg cramps.  His depression screen is positive today.     His labs are all current from all the test you had done last November and December except he is due for PSA and cholesterol panel.  We will wait and do those when he has his next set of labs from his other providers.

## 2023-06-07 NOTE — PROGRESS NOTES
Subjective:     CC: Here for his wellness exam.    HPI:   Yonis presents today with the following medical concerns:    Wellness examination  Patient is here for his wellness exam.  Also to go over all the events since last seen.  He developed discitis and osteomyelitis to the L4-5 area.  He has been on antibiotics since then.  He saw 1 neurosurgery clinic who recommended surgery but after he was cleared of infection.  Another 1 said he did not need surgery so his insurance has denied his ability to get surgery for this problem.  He still following up with infectious disease and is due to see them again soon to see if he is clear from his infection.  He is also fighting with into the insurance company as a refused to pay 2 days of his hospitalization stating he did not need to be in the hospital but that really was not up to the patient he was up to the doctor.  He is due to talk to them again today.  This is causing a lot of stress in his life.  He is asking for refill on his Robaxin as he gets muscle cramps at night when he is trying to sleep and that helps with it.  He knows not to use it during the daytime.  He does have some chronic low back pain that he just takes Tylenol for.  He does not want to use narcotics.  He has tried gabapentin but that caused worsening to his leg cramps.  His depression screen is positive today.     His labs are all current from all the test you had done last November and December except he is due for PSA and cholesterol panel.  We will wait and do those when he has his next set of labs from his other providers.    History reviewed. No pertinent past medical history.    Social History     Tobacco Use    Smoking status: Former     Types: Cigarettes     Quit date: 2018     Years since quittin.7    Smokeless tobacco: Never   Vaping Use    Vaping Use: Never used   Substance Use Topics    Alcohol use: Yes     Alcohol/week: 1.2 - 1.8 oz     Types: 2 - 3 Cans of beer per week      "Comment: casually    Drug use: Never       Current Outpatient Medications Ordered in Epic   Medication Sig Dispense Refill    doxycycline (MONODOX) 100 MG capsule Take 100 mg by mouth 2 times a day. TAKE 1 CAPSULE BY MOUTH 2 TIMES A DAY FOR 30 DAYS.      methocarbamol (ROBAXIN) 500 MG Tab Take 1-2 Tablets by mouth 2 times a day. 120 Tablet 1    DULoxetine (CYMBALTA) 30 MG Cap DR Particles Take 1 Capsule by mouth every day. 30 Capsule 1    acetaminophen (TYLENOL) 500 MG Tab Take 2 Tablets by mouth every 6 hours as needed for Moderate Pain.       No current Epic-ordered facility-administered medications on file.       Allergies:  Bactrim [sulfamethoxazole-trimethoprim]    Health Maintenance: Completed    ROS:  Gen: no fevers/chills, no changes in weight  Eyes: no changes in vision  ENT: no sore throat, no hearing loss, no bloody nose  Pulm: no sob, no cough  CV: no chest pain, no palpitations  GI: no nausea/vomiting, no diarrhea  : no dysuria  MSk: no myalgias  Skin: no rash  Neuro: no headaches, no numbness/tingling  Heme/Lymph: no easy bruising      Objective:       Exam:  /74 (BP Location: Right arm, Patient Position: Sitting, BP Cuff Size: Adult)   Pulse 64   Temp 36.9 °C (98.5 °F) (Temporal)   Resp 16   Ht 1.676 m (5' 6\")   Wt 60.3 kg (133 lb)   SpO2 96%   BMI 21.47 kg/m²  Body mass index is 21.47 kg/m².    Gen: Alert and oriented, No apparent distress.  Eyes:   Extraocular motions intact.  No scleral icterus seen.  Ears:    Ear canals and TMs are clear.  Neck: Neck is supple without lymphadenopathy.  Thyroid exam is normal.  Lungs: Normal effort, CTA bilaterally, no wheezes, rhonchi, or rales  CV: Regular rate and rhythm. No murmurs, rubs, or gallops.  No carotid bruits heard.  Abdomen: Soft, nontender, no organomegaly or masses.  Normal bowel sounds.  Ext: No clubbing, cyanosis, edema.  Patient does appear to have some stiffness in his lower back when he gets up from a sitting position.  Neuro: " Cranial nerves II through VIII are grossly intact.      Labs: Reviewed    Assessment & Plan:     53 y.o. male with the following -     1. Wellness examination  Patient's wellness exam today was done.  We talked about his amlodipine and he was told he can go ahead and discontinue that and see if he can remain off of it.  He is to come in for blood pressure check in 1 to 2 weeks.  We also talked about the use of duloxetine to help with his stress/depression over his condition as well as his chronic pain issues.  We will start him on 30 mg a day and he is to let me know in 1 to 2 weeks how he does on that.    We also talked about all his issues with his insurance company and he is on track to get that taken care of.  I encouraged him to continue follow-up with his infectious disease person to determine if the antibiotic can be discontinued as he is having sun sensitivity while on it as well.  Although is not asking for pain medication I told him in the future if that becomes a need he can return to clinic and we can discuss that.  - Patient has been identified as having a positive depression screening. Appropriate orders and counseling have been given.    Anticipatory guidance: I talked to patient about using a seatbelt regularly.  I talked him about proper sleep and exercise.  Also about healthy diet.  Return in about 1 year (around 6/7/2024) for annual exam.    Please note that this dictation was created using voice recognition software. I have made every reasonable attempt to correct obvious errors, but I expect that there are errors of grammar and possibly content that I did not discover before finalizing the note.

## 2023-10-17 RX ORDER — BACLOFEN 10 MG/1
10 TABLET ORAL 3 TIMES DAILY
Qty: 90 TABLET | Refills: 0 | Status: SHIPPED | OUTPATIENT
Start: 2023-10-17

## 2023-11-25 ENCOUNTER — OFFICE VISIT (OUTPATIENT)
Dept: URGENT CARE | Facility: PHYSICIAN GROUP | Age: 53
End: 2023-11-25
Payer: COMMERCIAL

## 2023-11-25 VITALS
BODY MASS INDEX: 22.4 KG/M2 | DIASTOLIC BLOOD PRESSURE: 74 MMHG | SYSTOLIC BLOOD PRESSURE: 136 MMHG | HEIGHT: 66 IN | TEMPERATURE: 98.1 F | RESPIRATION RATE: 16 BRPM | WEIGHT: 139.4 LBS | OXYGEN SATURATION: 97 % | HEART RATE: 69 BPM

## 2023-11-25 DIAGNOSIS — L98.9 SKIN LESION: ICD-10-CM

## 2023-11-25 DIAGNOSIS — J01.00 ACUTE MAXILLARY SINUSITIS, RECURRENCE NOT SPECIFIED: ICD-10-CM

## 2023-11-25 PROCEDURE — 3078F DIAST BP <80 MM HG: CPT | Performed by: PHYSICIAN ASSISTANT

## 2023-11-25 PROCEDURE — 3075F SYST BP GE 130 - 139MM HG: CPT | Performed by: PHYSICIAN ASSISTANT

## 2023-11-25 PROCEDURE — 99214 OFFICE O/P EST MOD 30 MIN: CPT | Performed by: PHYSICIAN ASSISTANT

## 2023-11-25 RX ORDER — DOXYCYCLINE HYCLATE 100 MG
100 TABLET ORAL 2 TIMES DAILY
Qty: 14 TABLET | Refills: 0 | Status: SHIPPED | OUTPATIENT
Start: 2023-11-25 | End: 2023-12-02

## 2023-11-25 RX ORDER — METHYLPREDNISOLONE 4 MG/1
TABLET ORAL
Qty: 21 TABLET | Refills: 0 | Status: SHIPPED | OUTPATIENT
Start: 2023-11-25

## 2023-11-25 ASSESSMENT — ENCOUNTER SYMPTOMS
FEVER: 0
CHILLS: 0
MYALGIAS: 0
HOARSE VOICE: 1
SINUS PAIN: 1
SINUS PRESSURE: 1
NECK PAIN: 0
DIARRHEA: 0
COUGH: 1
SWOLLEN GLANDS: 0
WHEEZING: 0
HEADACHES: 1
SORE THROAT: 1
SHORTNESS OF BREATH: 0
SPUTUM PRODUCTION: 1
NAUSEA: 0
VOMITING: 0
ABDOMINAL PAIN: 0

## 2023-11-25 ASSESSMENT — FIBROSIS 4 INDEX: FIB4 SCORE: 0.64

## 2023-11-25 NOTE — PROGRESS NOTES
Subjective     Efrain Ward is a 53 y.o. male who presents with Pharyngitis (For about two weeks has been having a sore throat, lost of voice, nose congestion, sinus pressure) and Other (Has a sore on his neck )            Sinus Problem  This is a new problem. Episode onset: 3 weeks. The problem has been gradually worsening since onset. There has been no fever. The pain is moderate. Associated symptoms include congestion, coughing (productive green mucous), headaches, a hoarse voice, sinus pressure and a sore throat. Pertinent negatives include no chills, ear pain, neck pain, shortness of breath, sneezing or swollen glands. Treatments tried: OTC sinus medication. Improvement on treatment: minimal.       The patient also complains of a neck sore for the past 3 weeks. He reports gradual increase  in size. He used a needle to try and pop it. Now it is draining and sore. No fever or chills.      No past medical history on file.      No past surgical history on file.      Family History   Problem Relation Age of Onset    Hypertension Mother     Heart Disease Father        Allergies:  Bactrim [sulfamethoxazole-trimethoprim]      Medications, Allergies, and current problem list reviewed today in Epic    Review of Systems   Constitutional:  Negative for chills and fever.   HENT:  Positive for congestion, hoarse voice, sinus pressure, sinus pain and sore throat. Negative for ear pain and sneezing.    Respiratory:  Positive for cough (productive green mucous) and sputum production. Negative for shortness of breath and wheezing.    Cardiovascular:  Negative for chest pain.   Gastrointestinal:  Negative for abdominal pain, diarrhea, nausea and vomiting.   Musculoskeletal:  Negative for myalgias and neck pain.   Skin:         Sore on right side of neck- draining   Neurological:  Positive for headaches.        All other systems reviewed and are negative.         Objective     /74 (BP Location: Right arm, Patient  "Position: Sitting, BP Cuff Size: Adult)   Pulse 69   Temp 36.7 °C (98.1 °F) (Temporal)   Resp 16   Ht 1.676 m (5' 6\")   Wt 63.2 kg (139 lb 6.4 oz)   SpO2 97%   BMI 22.50 kg/m²      Physical Exam  Constitutional:       General: He is not in acute distress.     Appearance: He is not ill-appearing.   HENT:      Head: Normocephalic and atraumatic.      Nose: Mucosal edema, congestion and rhinorrhea present.      Right Sinus: Maxillary sinus tenderness present.      Left Sinus: Maxillary sinus tenderness present.      Mouth/Throat:      Mouth: Mucous membranes are moist.      Pharynx: No posterior oropharyngeal erythema.   Eyes:      Conjunctiva/sclera: Conjunctivae normal.   Neck:     Cardiovascular:      Rate and Rhythm: Normal rate and regular rhythm.      Heart sounds: Normal heart sounds.   Pulmonary:      Effort: Pulmonary effort is normal. No respiratory distress.      Breath sounds: Normal breath sounds. No wheezing, rhonchi or rales.   Lymphadenopathy:      Cervical: No cervical adenopathy.   Skin:     General: Skin is warm and dry.   Neurological:      General: No focal deficit present.      Mental Status: He is alert and oriented to person, place, and time.   Psychiatric:         Mood and Affect: Mood normal.         Behavior: Behavior normal.         Thought Content: Thought content normal.         Judgment: Judgment normal.                             Assessment & Plan        1. Acute maxillary sinusitis, recurrence not specified    - doxycycline (VIBRAMYCIN) 100 MG Tab; Take 1 Tablet by mouth 2 times a day for 7 days.  Dispense: 14 Tablet; Refill: 0  - methylPREDNISolone (MEDROL DOSEPAK) 4 MG Tablet Therapy Pack; Follow schedule on package instructions.  Dispense: 21 Tablet; Refill: 0    2. Skin lesion    Doxycycline will cover for infection  Lesion is concerning- needs biopsy.   Referral placed to DERM  - Referral to Dermatology     Differential diagnoses, Supportive care, and indications for " immediate follow-up discussed with patient.   Pathogenesis of diagnosis discussed including typical length and natural progression.   Instructed to return to clinic or nearest emergency department for any change in condition, further concerns, or worsening of symptoms.      The patient demonstrated a good understanding and agreed with the treatment plan.      Laverne Nuñez P.A.-C.

## 2024-02-02 ENCOUNTER — APPOINTMENT (RX ONLY)
Dept: URBAN - METROPOLITAN AREA CLINIC 22 | Facility: CLINIC | Age: 54
Setting detail: DERMATOLOGY
End: 2024-02-02

## 2024-02-02 PROBLEM — D48.5 NEOPLASM OF UNCERTAIN BEHAVIOR OF SKIN: Status: ACTIVE | Noted: 2024-02-02

## 2024-02-02 PROCEDURE — 11102 TANGNTL BX SKIN SINGLE LES: CPT

## 2024-02-02 PROCEDURE — ? BIOPSY BY SHAVE METHOD

## 2024-02-02 NOTE — PROCEDURE: BIOPSY BY SHAVE METHOD
Detail Level: Detailed
Depth Of Biopsy: dermis
Was A Bandage Applied: Yes
Size Of Lesion In Cm: 2.5
X Size Of Lesion In Cm: 0
Biopsy Type: H and E
Biopsy Method: Personna blade
Anesthesia Type: 1% lidocaine with epinephrine
Anesthesia Volume In Cc: 1.5
Hemostasis: Aluminum Chloride and Electrocautery
Wound Care: Petrolatum
Dressing: pressure dressing with telfa
Destruction After The Procedure: No
Type Of Destruction Used: Curettage
Curettage Text: The wound bed was treated with curettage after the biopsy was performed.
Cryotherapy Text: The wound bed was treated with cryotherapy after the biopsy was performed.
Electrodesiccation Text: The wound bed was treated with electrodesiccation after the biopsy was performed.
Electrodesiccation And Curettage Text: The wound bed was treated with electrodesiccation and curettage after the biopsy was performed.
Silver Nitrate Text: The wound bed was treated with silver nitrate after the biopsy was performed.
Lab: [id number]
Lab Facility: 
Consent: Written consent was obtained and risks were reviewed including but not limited to scarring, infection, bleeding, scabbing, incomplete removal, nerve damage and allergy to anesthesia.
Post-Care Instructions: I reviewed with the patient in detail post-care instructions. Patient is to keep the biopsy site dry overnight. Gentle cleansing daily.  Apply petroleum ointment daily until healed. Patient may apply hydrogen peroxide soaks to remove any crusting.
Notification Instructions: Patient will be notified of biopsy results. However, patient instructed to call the office if not contacted within 2 weeks.
Billing Type: Third-Party Bill
Information: Selecting Yes will display possible errors in your note based on the variables you have selected. This validation is only offered as a suggestion for you. PLEASE NOTE THAT THE VALIDATION TEXT WILL BE REMOVED WHEN YOU FINALIZE YOUR NOTE. IF YOU WANT TO FAX A PRELIMINARY NOTE YOU WILL NEED TO TOGGLE THIS TO 'NO' IF YOU DO NOT WANT IT IN YOUR FAXED NOTE.

## 2024-02-13 DIAGNOSIS — C43.4 MALIGNANT MELANOMA OF NECK (HCC): ICD-10-CM

## 2024-02-28 ENCOUNTER — APPOINTMENT (OUTPATIENT)
Dept: URGENT CARE | Facility: PHYSICIAN GROUP | Age: 54
End: 2024-02-28
Payer: COMMERCIAL

## 2024-04-18 ENCOUNTER — HOSPITAL ENCOUNTER (OUTPATIENT)
Facility: MEDICAL CENTER | Age: 54
End: 2024-04-18
Attending: INTERNAL MEDICINE
Payer: COMMERCIAL

## 2024-04-18 PROCEDURE — 80074 ACUTE HEPATITIS PANEL: CPT

## 2024-04-19 LAB
HAV IGM SERPL QL IA: NORMAL
HBV CORE IGM SER QL: NORMAL
HBV SURFACE AG SER QL: NORMAL
HCV AB SER QL: NORMAL

## 2024-04-22 ENCOUNTER — HOSPITAL ENCOUNTER (OUTPATIENT)
Dept: RADIOLOGY | Facility: MEDICAL CENTER | Age: 54
End: 2024-04-22
Payer: COMMERCIAL

## 2024-06-01 ENCOUNTER — APPOINTMENT (OUTPATIENT)
Dept: LAB | Facility: MEDICAL CENTER | Age: 54
End: 2024-06-01
Payer: COMMERCIAL

## 2024-06-03 ENCOUNTER — HOSPITAL ENCOUNTER (OUTPATIENT)
Facility: MEDICAL CENTER | Age: 54
End: 2024-06-03
Attending: INTERNAL MEDICINE
Payer: COMMERCIAL

## 2024-06-03 LAB
ALBUMIN SERPL BCP-MCNC: 4.3 G/DL (ref 3.2–4.9)
ALBUMIN/GLOB SERPL: 1.7 G/DL
ALP SERPL-CCNC: 90 U/L (ref 30–99)
ALT SERPL-CCNC: 12 U/L (ref 2–50)
ANION GAP SERPL CALC-SCNC: 13 MMOL/L (ref 7–16)
AST SERPL-CCNC: 9 U/L (ref 12–45)
BILIRUB SERPL-MCNC: 0.4 MG/DL (ref 0.1–1.5)
BUN SERPL-MCNC: 14 MG/DL (ref 8–22)
CALCIUM ALBUM COR SERPL-MCNC: 8.8 MG/DL (ref 8.5–10.5)
CALCIUM SERPL-MCNC: 9 MG/DL (ref 8.5–10.5)
CHLORIDE SERPL-SCNC: 108 MMOL/L (ref 96–112)
CO2 SERPL-SCNC: 19 MMOL/L (ref 20–33)
CREAT SERPL-MCNC: 0.95 MG/DL (ref 0.5–1.4)
GFR SERPLBLD CREATININE-BSD FMLA CKD-EPI: 96 ML/MIN/1.73 M 2
GLOBULIN SER CALC-MCNC: 2.5 G/DL (ref 1.9–3.5)
GLUCOSE SERPL-MCNC: 116 MG/DL (ref 65–99)
POTASSIUM SERPL-SCNC: 4.3 MMOL/L (ref 3.6–5.5)
PROT SERPL-MCNC: 6.8 G/DL (ref 6–8.2)
SODIUM SERPL-SCNC: 140 MMOL/L (ref 135–145)
TSH SERPL DL<=0.005 MIU/L-ACNC: 1.43 UIU/ML (ref 0.38–5.33)

## 2024-06-03 PROCEDURE — 80053 COMPREHEN METABOLIC PANEL: CPT

## 2024-06-03 PROCEDURE — 84443 ASSAY THYROID STIM HORMONE: CPT

## 2024-06-20 ENCOUNTER — HOSPITAL ENCOUNTER (OUTPATIENT)
Facility: MEDICAL CENTER | Age: 54
End: 2024-06-20
Attending: INTERNAL MEDICINE
Payer: COMMERCIAL

## 2024-06-20 LAB
ALBUMIN SERPL BCP-MCNC: 4.6 G/DL (ref 3.2–4.9)
ALBUMIN/GLOB SERPL: 2 G/DL
ALP SERPL-CCNC: 97 U/L (ref 30–99)
ALT SERPL-CCNC: 15 U/L (ref 2–50)
ANION GAP SERPL CALC-SCNC: 14 MMOL/L (ref 7–16)
AST SERPL-CCNC: 18 U/L (ref 12–45)
BASOPHILS # BLD AUTO: 0.4 % (ref 0–1.8)
BASOPHILS # BLD: 0.03 K/UL (ref 0–0.12)
BILIRUB SERPL-MCNC: 0.5 MG/DL (ref 0.1–1.5)
BUN SERPL-MCNC: 17 MG/DL (ref 8–22)
CALCIUM ALBUM COR SERPL-MCNC: 8.8 MG/DL (ref 8.5–10.5)
CALCIUM SERPL-MCNC: 9.3 MG/DL (ref 8.5–10.5)
CHLORIDE SERPL-SCNC: 104 MMOL/L (ref 96–112)
CO2 SERPL-SCNC: 22 MMOL/L (ref 20–33)
CREAT SERPL-MCNC: 1.03 MG/DL (ref 0.5–1.4)
EOSINOPHIL # BLD AUTO: 0.09 K/UL (ref 0–0.51)
EOSINOPHIL NFR BLD: 1.3 % (ref 0–6.9)
ERYTHROCYTE [DISTWIDTH] IN BLOOD BY AUTOMATED COUNT: 45.1 FL (ref 35.9–50)
GFR SERPLBLD CREATININE-BSD FMLA CKD-EPI: 87 ML/MIN/1.73 M 2
GLOBULIN SER CALC-MCNC: 2.3 G/DL (ref 1.9–3.5)
GLUCOSE SERPL-MCNC: 96 MG/DL (ref 65–99)
HCT VFR BLD AUTO: 49 % (ref 42–52)
HGB BLD-MCNC: 16.1 G/DL (ref 14–18)
IMM GRANULOCYTES # BLD AUTO: 0.03 K/UL (ref 0–0.11)
IMM GRANULOCYTES NFR BLD AUTO: 0.4 % (ref 0–0.9)
LYMPHOCYTES # BLD AUTO: 1.21 K/UL (ref 1–4.8)
LYMPHOCYTES NFR BLD: 17.6 % (ref 22–41)
MCH RBC QN AUTO: 30.3 PG (ref 27–33)
MCHC RBC AUTO-ENTMCNC: 32.9 G/DL (ref 32.3–36.5)
MCV RBC AUTO: 92.1 FL (ref 81.4–97.8)
MONOCYTES # BLD AUTO: 0.58 K/UL (ref 0–0.85)
MONOCYTES NFR BLD AUTO: 8.5 % (ref 0–13.4)
NEUTROPHILS # BLD AUTO: 4.92 K/UL (ref 1.82–7.42)
NEUTROPHILS NFR BLD: 71.8 % (ref 44–72)
NRBC # BLD AUTO: 0 K/UL
NRBC BLD-RTO: 0 /100 WBC (ref 0–0.2)
PLATELET # BLD AUTO: 298 K/UL (ref 164–446)
PMV BLD AUTO: 9.1 FL (ref 9–12.9)
POTASSIUM SERPL-SCNC: 4.6 MMOL/L (ref 3.6–5.5)
PROT SERPL-MCNC: 6.9 G/DL (ref 6–8.2)
RBC # BLD AUTO: 5.32 M/UL (ref 4.7–6.1)
SODIUM SERPL-SCNC: 140 MMOL/L (ref 135–145)
TSH SERPL DL<=0.005 MIU/L-ACNC: 1.33 UIU/ML (ref 0.38–5.33)
WBC # BLD AUTO: 6.9 K/UL (ref 4.8–10.8)

## 2024-06-20 PROCEDURE — 85025 COMPLETE CBC W/AUTO DIFF WBC: CPT

## 2024-06-20 PROCEDURE — 80053 COMPREHEN METABOLIC PANEL: CPT

## 2024-06-20 PROCEDURE — 84443 ASSAY THYROID STIM HORMONE: CPT

## 2024-07-11 ENCOUNTER — HOSPITAL ENCOUNTER (OUTPATIENT)
Facility: MEDICAL CENTER | Age: 54
End: 2024-07-11
Attending: INTERNAL MEDICINE
Payer: COMMERCIAL

## 2024-07-11 LAB
ALBUMIN SERPL BCP-MCNC: 4.8 G/DL (ref 3.2–4.9)
ALBUMIN/GLOB SERPL: 2.2 G/DL
ALP SERPL-CCNC: 104 U/L (ref 30–99)
ALT SERPL-CCNC: 14 U/L (ref 2–50)
ANION GAP SERPL CALC-SCNC: 14 MMOL/L (ref 7–16)
AST SERPL-CCNC: 11 U/L (ref 12–45)
BASOPHILS # BLD AUTO: 0.8 % (ref 0–1.8)
BASOPHILS # BLD: 0.05 K/UL (ref 0–0.12)
BILIRUB SERPL-MCNC: 0.7 MG/DL (ref 0.1–1.5)
BUN SERPL-MCNC: 17 MG/DL (ref 8–22)
CALCIUM ALBUM COR SERPL-MCNC: 9.5 MG/DL (ref 8.5–10.5)
CALCIUM SERPL-MCNC: 10.1 MG/DL (ref 8.5–10.5)
CHLORIDE SERPL-SCNC: 101 MMOL/L (ref 96–112)
CO2 SERPL-SCNC: 22 MMOL/L (ref 20–33)
CREAT SERPL-MCNC: 1 MG/DL (ref 0.5–1.4)
EOSINOPHIL # BLD AUTO: 0.12 K/UL (ref 0–0.51)
EOSINOPHIL NFR BLD: 2 % (ref 0–6.9)
ERYTHROCYTE [DISTWIDTH] IN BLOOD BY AUTOMATED COUNT: 45.2 FL (ref 35.9–50)
GFR SERPLBLD CREATININE-BSD FMLA CKD-EPI: 90 ML/MIN/1.73 M 2
GLOBULIN SER CALC-MCNC: 2.2 G/DL (ref 1.9–3.5)
GLUCOSE SERPL-MCNC: 109 MG/DL (ref 65–99)
HCT VFR BLD AUTO: 50.6 % (ref 42–52)
HGB BLD-MCNC: 16.7 G/DL (ref 14–18)
IMM GRANULOCYTES # BLD AUTO: 0.02 K/UL (ref 0–0.11)
IMM GRANULOCYTES NFR BLD AUTO: 0.3 % (ref 0–0.9)
LYMPHOCYTES # BLD AUTO: 1.36 K/UL (ref 1–4.8)
LYMPHOCYTES NFR BLD: 22.3 % (ref 22–41)
MCH RBC QN AUTO: 30.8 PG (ref 27–33)
MCHC RBC AUTO-ENTMCNC: 33 G/DL (ref 32.3–36.5)
MCV RBC AUTO: 93.4 FL (ref 81.4–97.8)
MONOCYTES # BLD AUTO: 0.7 K/UL (ref 0–0.85)
MONOCYTES NFR BLD AUTO: 11.5 % (ref 0–13.4)
NEUTROPHILS # BLD AUTO: 3.85 K/UL (ref 1.82–7.42)
NEUTROPHILS NFR BLD: 63.1 % (ref 44–72)
NRBC # BLD AUTO: 0 K/UL
NRBC BLD-RTO: 0 /100 WBC (ref 0–0.2)
PLATELET # BLD AUTO: 334 K/UL (ref 164–446)
PMV BLD AUTO: 9.2 FL (ref 9–12.9)
POTASSIUM SERPL-SCNC: 4.4 MMOL/L (ref 3.6–5.5)
PROT SERPL-MCNC: 7 G/DL (ref 6–8.2)
RBC # BLD AUTO: 5.42 M/UL (ref 4.7–6.1)
SODIUM SERPL-SCNC: 137 MMOL/L (ref 135–145)
TSH SERPL DL<=0.005 MIU/L-ACNC: 1.73 UIU/ML (ref 0.38–5.33)
WBC # BLD AUTO: 6.1 K/UL (ref 4.8–10.8)

## 2024-07-11 PROCEDURE — 80053 COMPREHEN METABOLIC PANEL: CPT

## 2024-07-11 PROCEDURE — 84443 ASSAY THYROID STIM HORMONE: CPT

## 2024-07-11 PROCEDURE — 85025 COMPLETE CBC W/AUTO DIFF WBC: CPT

## 2024-07-16 ENCOUNTER — TELEPHONE (OUTPATIENT)
Dept: HEALTH INFORMATION MANAGEMENT | Facility: OTHER | Age: 54
End: 2024-07-16

## 2024-07-16 NOTE — TELEPHONE ENCOUNTER
"1. Caller Name: Efrain R Featherston                          Call Back Number: [phone]      How would the patient prefer to be contacted with a response: Phone call OK to leave a detailed message    Please review [facility] messages and call patient to review his options. Pt was last seen by PCP over a year ago on June 7, 2023. PT is requesting a referral. I asked him to call and schedule and appointment and he replied \" I’ll just stop getting my cancer treatment then\" Thank you for following up.      Work Basket Messages:     You  Efrain WardJust now (11:25 AM)    Rayshawn Zuniga,      I will forward your message to Dr Vasile vines MD.  I will request that the clinical staff will reach out to you.      Sincerely, Your Healthcare Team.    This VisualXcript message has not been read.    Efrain GUNDERSON [facility]1 hour ago (10:17 AM)      I’ll just stop getting my cancer treatment then       You  Efrain Ward2 hours ago (9:22 AM)    Rayshawn Zuniga      I have reviewed your file.   At this time we are unable to process your referral requests, because you have not been seen by your primary care provider in over a year. You last visit with Dr Vasile vines was on June 7, 2023.     Please call [phone] to schedule an appointment to discuss your referral request. This is the way to resolve your request.       Thank you for choosing [facility].       Efrain GUNDERSON [facility]heqgeoq29 hours ago (3:16 PM)      Hello again well my insurance changed from a Ppo to an HMO and apparently I need a prior authorization from my pcp to visit Cancer care Specialist specifically Dr. Wolf. I was told yo ask Dr Terrell to authorize my office visits to see her for up to 30 visits.     Bhumi Gordillo, Med Ass't  Efrain Ward4 days ago    Rayshawn Zuniga     I reviewed you chart and was reaching out for a little more information. I checked your current and past medication list and " don't see Keytruda or Pembrolizumab on your list. Did Dr. Vasile Terrell prescribe this medication or did another specialty office prescribe such as Infectious disease or Dermatology? I am also wondering if you need a Prior Authorization for that medication verse a referral. Some medications not listed on your insurances formulary will require that a Doctor submit a Prior Authorization for that medication. The prescribing Provider/Doctor will need to submit for that.      Sincerely,  Your Healthcare Team    Efrain GUNDERSON [facility] Outreach5 days ago      Yonis Ward would like to request a referral.  Reason: Keytruda treatment  Requested provider: Vasile Terrell  Comment:  My new insurance requires my primary doctor send a referral for treatment I have been receiving for 2 months. I would appreciate the referral to continue treatment.  Thank you

## 2024-07-17 DIAGNOSIS — C43.8 MALIGNANT MELANOMA OF OVERLAPPING SITES (HCC): ICD-10-CM

## 2024-07-17 PROBLEM — C43.9 MELANOMA (HCC): Status: ACTIVE | Noted: 2024-07-17

## 2024-07-17 RX ORDER — 0.9 % SODIUM CHLORIDE 0.9 %
3 VIAL (ML) INJECTION PRN
OUTPATIENT
Start: 2024-07-22

## 2024-07-17 RX ORDER — 0.9 % SODIUM CHLORIDE 0.9 %
VIAL (ML) INJECTION PRN
OUTPATIENT
Start: 2024-07-21

## 2024-07-17 RX ORDER — ONDANSETRON 8 MG/1
8 TABLET, ORALLY DISINTEGRATING ORAL PRN
OUTPATIENT
Start: 2024-07-22

## 2024-07-17 RX ORDER — METHYLPREDNISOLONE SODIUM SUCCINATE 125 MG/2ML
125 INJECTION, POWDER, LYOPHILIZED, FOR SOLUTION INTRAMUSCULAR; INTRAVENOUS PRN
OUTPATIENT
Start: 2024-07-22

## 2024-07-17 RX ORDER — 0.9 % SODIUM CHLORIDE 0.9 %
10 VIAL (ML) INJECTION PRN
OUTPATIENT
Start: 2024-07-21

## 2024-07-17 RX ORDER — PROCHLORPERAZINE MALEATE 10 MG
10 TABLET ORAL EVERY 6 HOURS PRN
OUTPATIENT
Start: 2024-07-22

## 2024-07-17 RX ORDER — SODIUM CHLORIDE 9 MG/ML
INJECTION, SOLUTION INTRAVENOUS CONTINUOUS
OUTPATIENT
Start: 2024-07-22

## 2024-07-17 RX ORDER — 0.9 % SODIUM CHLORIDE 0.9 %
3 VIAL (ML) INJECTION PRN
OUTPATIENT
Start: 2024-07-21

## 2024-07-17 RX ORDER — EPINEPHRINE 1 MG/ML(1)
0.5 AMPUL (ML) INJECTION PRN
OUTPATIENT
Start: 2024-07-22

## 2024-07-17 RX ORDER — 0.9 % SODIUM CHLORIDE 0.9 %
VIAL (ML) INJECTION PRN
OUTPATIENT
Start: 2024-07-22

## 2024-07-17 RX ORDER — 0.9 % SODIUM CHLORIDE 0.9 %
10 VIAL (ML) INJECTION PRN
OUTPATIENT
Start: 2024-07-22

## 2024-07-17 RX ORDER — DIPHENHYDRAMINE HYDROCHLORIDE 50 MG/ML
50 INJECTION INTRAMUSCULAR; INTRAVENOUS PRN
OUTPATIENT
Start: 2024-07-22

## 2024-07-17 RX ORDER — ONDANSETRON 2 MG/ML
4 INJECTION INTRAMUSCULAR; INTRAVENOUS PRN
OUTPATIENT
Start: 2024-07-22

## 2024-07-19 ENCOUNTER — OFFICE VISIT (OUTPATIENT)
Dept: MEDICAL GROUP | Facility: PHYSICIAN GROUP | Age: 54
End: 2024-07-19
Payer: COMMERCIAL

## 2024-07-19 VITALS
OXYGEN SATURATION: 98 % | BODY MASS INDEX: 22.53 KG/M2 | HEIGHT: 66 IN | DIASTOLIC BLOOD PRESSURE: 76 MMHG | HEART RATE: 64 BPM | RESPIRATION RATE: 16 BRPM | TEMPERATURE: 98.2 F | WEIGHT: 140.2 LBS | SYSTOLIC BLOOD PRESSURE: 128 MMHG

## 2024-07-19 DIAGNOSIS — C43.9 MALIGNANT MELANOMA, UNSPECIFIED SITE (HCC): ICD-10-CM

## 2024-07-19 DIAGNOSIS — Z00.00 WELLNESS EXAMINATION: ICD-10-CM

## 2024-07-19 DIAGNOSIS — Z12.11 COLON CANCER SCREENING: ICD-10-CM

## 2024-07-19 PROBLEM — D64.9 ANEMIA: Status: RESOLVED | Noted: 2022-10-19 | Resolved: 2024-07-19

## 2024-07-19 PROBLEM — R03.0 ELEVATED BLOOD-PRESSURE READING WITHOUT DIAGNOSIS OF HYPERTENSION: Status: RESOLVED | Noted: 2022-10-19 | Resolved: 2024-07-19

## 2024-07-19 PROBLEM — L02.91 PHLEGMON: Status: RESOLVED | Noted: 2022-10-20 | Resolved: 2024-07-19

## 2024-07-19 PROBLEM — G06.1 ABSCESS IN EPIDURAL SPACE OF LUMBAR SPINE: Status: RESOLVED | Noted: 2022-11-11 | Resolved: 2024-07-19

## 2024-07-19 PROBLEM — M46.46 LUMBAR DISCITIS: Status: RESOLVED | Noted: 2022-11-11 | Resolved: 2024-07-19

## 2024-07-19 PROBLEM — M46.20 OSTEOMYELITIS OF VERTEBRA (HCC): Status: RESOLVED | Noted: 2022-10-19 | Resolved: 2024-07-19

## 2024-07-19 PROCEDURE — 3074F SYST BP LT 130 MM HG: CPT | Performed by: FAMILY MEDICINE

## 2024-07-19 PROCEDURE — 3078F DIAST BP <80 MM HG: CPT | Performed by: FAMILY MEDICINE

## 2024-07-19 PROCEDURE — 99396 PREV VISIT EST AGE 40-64: CPT | Performed by: FAMILY MEDICINE

## 2024-07-19 ASSESSMENT — PATIENT HEALTH QUESTIONNAIRE - PHQ9: CLINICAL INTERPRETATION OF PHQ2 SCORE: 0

## 2024-07-19 ASSESSMENT — FIBROSIS 4 INDEX: FIB4 SCORE: 0.46

## 2024-08-08 ENCOUNTER — TELEPHONE (OUTPATIENT)
Dept: MEDICAL GROUP | Facility: PHYSICIAN GROUP | Age: 54
End: 2024-08-08
Payer: COMMERCIAL

## 2024-08-08 DIAGNOSIS — C43.4 MALIGNANT MELANOMA OF NECK (HCC): ICD-10-CM

## 2024-08-08 NOTE — TELEPHONE ENCOUNTER
Shirley called from [facility] stating that the patient has had a change in insurance and now needs a new referral to be seen for his appointment on Monday. They were requesting a stat referral be sent.

## 2024-08-15 ENCOUNTER — RESEARCH ENCOUNTER (OUTPATIENT)
Dept: RESEARCH | Facility: MEDICAL CENTER | Age: 54
End: 2024-08-15
Payer: COMMERCIAL

## 2024-08-15 NOTE — RESEARCH NOTE
Lab MT;     Called 08/19 and LVM to sign consents;     Patient did not sign consent forms prior to lab appointment;

## 2024-08-21 ENCOUNTER — APPOINTMENT (OUTPATIENT)
Dept: LAB | Facility: MEDICAL CENTER | Age: 54
End: 2024-08-21
Payer: COMMERCIAL

## 2024-08-21 NOTE — PROGRESS NOTES
"Pharmacy Chemotherapy Calculation:    Dx: Cutaneous Melanoma         Protocol: Pembrolizumab      *Dosing Reference*  Pembrolizumab 200 mg IV over 30 min on Day 1   Repeat every 21 days for 12 months (adjuvant treatment) or until disease progression, unacceptable toxicity or up to 24 months of therapy has been completed (metastatic or unresectable disease)   Or  Pembrolizumab 400 mg IV over 30 min on Day 1   Repeat every 42 days for 12 months (adjuvant treatment) or until disease progression, unacceptable toxicity or up to 24 months of therapy has been completed (metastatic or unresectable disease)   NCCN Guidelines for Melanoma Cutaneous V.2.2024.  Shawna SINCLAIR et al. Eur J Cancer.2020;131:68-75.  Huang C, et al. N Engl J Med. 2015:372(26):2521-32.  Mehrdad O, et al.Reba Oncol.2019:30(4):582-588.  Marcus BARAJAS et al.N Engl Med.2018;387(19):6109-5025.  Kris FERNANDEZ, et al.Future Oncol.2020:16(3):0250-7431.      Allergies:  Bactrim [sulfamethoxazole-trimethoprim]     /72   Pulse 65   Temp 36.7 °C (98.1 °F) (Temporal)   Resp 18   Ht 1.64 m (5' 4.57\")   Wt 62.8 kg (138 lb 7.2 oz)   SpO2 97%   BMI 23.35 kg/m²  Body surface area is 1.69 meters squared.    Labs 8/23/24:  ANC~ 2950 Plt = 268k   Hgb = 15.5     SCr = 0.75 mg/dL CrCl ~ 102 mL/min   AST/ALT/AP = WNL TBili = 0.6   K+ = 3.6  TSH = 0.78   Free T4 = 1.07       Drug Order   (Drug name, dose, route, IV Fluid & volume, frequency, number of doses) Cycle 1      Previous treatment: s/p 3 cycles of pembro at [facility] last 6/24/24     Medication = Pembrolizumab   Base Dose = 200 mg   Fixed dose, no calculation needed   Final Dose = 200 mg  Route = IV  Fluid & Volume = NS 50 mL  Admin Duration = Over 30 min           Fixed dose okay to treat with final dose       By my signature below, I confirm this process was performed independently with the BSA and all final chemotherapy dosing calculations congruent. I have reviewed the above chemotherapy order and that my calculation " of the final dose and BSA (when applicable) corroborate those calculations of the  pharmacist. Discrepancies of 10% or greater in the written dose have been addressed and documented within the EPIC Progress notes.    Lainey Clemente, PharmD, BCOP

## 2024-08-22 NOTE — PROGRESS NOTES
"Pharmacy Chemotherapy Calculation:    Dx: Melenoma        Cycle 1  Previous treatment: started pembrolizuma 5/2024 @ [facility]    Protocol: Pembrolizumab     *Dosing Reference*  Pembrolizumab 200 mg IV over 30 minutes on Day 1  21-day cycle for 12 months (adjuvant treatment)     NCCNGuidelines® for Melanoma: Cutaneous V.2.2024.   Shawna M , et al. Eur J Cancer. 2020;131:68-75.b   Huang RAMSEY , et al. N Engl J Med. 2015;372(26):2521-32.a   Mehrdad O , et al. Reba Oncol. 2019;30(4):582- 588.a   Marcus CASTRO , et al. N Engl J Med. 2018;378(19):9228-5705.a   Kris FERNANDEZ , et al. Future Oncol. 2020;16(3):9512-7351.b    Allergies:  Bactrim [sulfamethoxazole-trimethoprim]     /72   Pulse 65   Temp 36.7 °C (98.1 °F) (Temporal)   Resp 18   Ht 1.64 m (5' 4.57\")   Wt 62.8 kg (138 lb 7.2 oz)   SpO2 97%   BMI 23.35 kg/m²  Body surface area is 1.69 meters squared.    Labs 8/23/24:  ANC~ 2950 Plt = 268k   Hgb = 15.5     SCr = 0.75 mg/dL CrCl ~ 10.2 mL/min   AST/ALT/AP = WNL TBili = 0.6   K+ = 3.6  TSH = 0.78   Free T4 = 1.07        Pembrolizumab 200 mg    Fixed dose, no calc required =  200 mg IV  "

## 2024-08-23 ENCOUNTER — OUTPATIENT INFUSION SERVICES (OUTPATIENT)
Dept: ONCOLOGY | Facility: MEDICAL CENTER | Age: 54
End: 2024-08-23
Attending: INTERNAL MEDICINE
Payer: COMMERCIAL

## 2024-08-23 VITALS
DIASTOLIC BLOOD PRESSURE: 72 MMHG | BODY MASS INDEX: 23.07 KG/M2 | RESPIRATION RATE: 18 BRPM | OXYGEN SATURATION: 97 % | HEIGHT: 65 IN | HEART RATE: 65 BPM | TEMPERATURE: 98.1 F | WEIGHT: 138.45 LBS | SYSTOLIC BLOOD PRESSURE: 128 MMHG

## 2024-08-23 DIAGNOSIS — C43.8 MALIGNANT MELANOMA OF OVERLAPPING SITES (HCC): ICD-10-CM

## 2024-08-23 LAB
ALBUMIN SERPL BCP-MCNC: 4.4 G/DL (ref 3.2–4.9)
ALBUMIN/GLOB SERPL: 1.9 G/DL
ALP SERPL-CCNC: 88 U/L (ref 30–99)
ALT SERPL-CCNC: 13 U/L (ref 2–50)
ANION GAP SERPL CALC-SCNC: 11 MMOL/L (ref 7–16)
AST SERPL-CCNC: 14 U/L (ref 12–45)
BASOPHILS # BLD AUTO: 0.5 % (ref 0–1.8)
BASOPHILS # BLD: 0.02 K/UL (ref 0–0.12)
BILIRUB SERPL-MCNC: 0.6 MG/DL (ref 0.1–1.5)
BUN SERPL-MCNC: 21 MG/DL (ref 8–22)
CALCIUM ALBUM COR SERPL-MCNC: 8.1 MG/DL (ref 8.5–10.5)
CALCIUM SERPL-MCNC: 8.4 MG/DL (ref 8.5–10.5)
CHLORIDE SERPL-SCNC: 106 MMOL/L (ref 96–112)
CO2 SERPL-SCNC: 19 MMOL/L (ref 20–33)
CREAT SERPL-MCNC: 0.75 MG/DL (ref 0.5–1.4)
EOSINOPHIL # BLD AUTO: 0.13 K/UL (ref 0–0.51)
EOSINOPHIL NFR BLD: 3 % (ref 0–6.9)
ERYTHROCYTE [DISTWIDTH] IN BLOOD BY AUTOMATED COUNT: 42 FL (ref 35.9–50)
GFR SERPLBLD CREATININE-BSD FMLA CKD-EPI: 108 ML/MIN/1.73 M 2
GLOBULIN SER CALC-MCNC: 2.3 G/DL (ref 1.9–3.5)
GLUCOSE SERPL-MCNC: 147 MG/DL (ref 65–99)
HCT VFR BLD AUTO: 45.4 % (ref 42–52)
HGB BLD-MCNC: 15.5 G/DL (ref 14–18)
IMM GRANULOCYTES # BLD AUTO: 0.01 K/UL (ref 0–0.11)
IMM GRANULOCYTES NFR BLD AUTO: 0.2 % (ref 0–0.9)
LYMPHOCYTES # BLD AUTO: 0.95 K/UL (ref 1–4.8)
LYMPHOCYTES NFR BLD: 21.6 % (ref 22–41)
MCH RBC QN AUTO: 30 PG (ref 27–33)
MCHC RBC AUTO-ENTMCNC: 34.1 G/DL (ref 32.3–36.5)
MCV RBC AUTO: 88 FL (ref 81.4–97.8)
MONOCYTES # BLD AUTO: 0.34 K/UL (ref 0–0.85)
MONOCYTES NFR BLD AUTO: 7.7 % (ref 0–13.4)
NEUTROPHILS # BLD AUTO: 2.95 K/UL (ref 1.82–7.42)
NEUTROPHILS NFR BLD: 67 % (ref 44–72)
NRBC # BLD AUTO: 0 K/UL
NRBC BLD-RTO: 0 /100 WBC (ref 0–0.2)
OUTPT INFUS CBC COMMENT OICOM: ABNORMAL
PLATELET # BLD AUTO: 268 K/UL (ref 164–446)
PMV BLD AUTO: 8.8 FL (ref 9–12.9)
POTASSIUM SERPL-SCNC: 3.6 MMOL/L (ref 3.6–5.5)
PROT SERPL-MCNC: 6.7 G/DL (ref 6–8.2)
RBC # BLD AUTO: 5.16 M/UL (ref 4.7–6.1)
SODIUM SERPL-SCNC: 136 MMOL/L (ref 135–145)
T4 FREE SERPL-MCNC: 1.07 NG/DL (ref 0.93–1.7)
TSH SERPL-ACNC: 0.78 UIU/ML (ref 0.35–5.5)
WBC # BLD AUTO: 4.4 K/UL (ref 4.8–10.8)

## 2024-08-23 PROCEDURE — 85025 COMPLETE CBC W/AUTO DIFF WBC: CPT

## 2024-08-23 PROCEDURE — 84443 ASSAY THYROID STIM HORMONE: CPT

## 2024-08-23 PROCEDURE — 700111 HCHG RX REV CODE 636 W/ 250 OVERRIDE (IP): Mod: JZ | Performed by: INTERNAL MEDICINE

## 2024-08-23 PROCEDURE — 80053 COMPREHEN METABOLIC PANEL: CPT

## 2024-08-23 PROCEDURE — 700105 HCHG RX REV CODE 258: Performed by: INTERNAL MEDICINE

## 2024-08-23 PROCEDURE — 96413 CHEMO IV INFUSION 1 HR: CPT

## 2024-08-23 PROCEDURE — 84439 ASSAY OF FREE THYROXINE: CPT

## 2024-08-23 RX ADMIN — SODIUM CHLORIDE 200 MG: 9 INJECTION, SOLUTION INTRAVENOUS at 09:41

## 2024-08-23 ASSESSMENT — FIBROSIS 4 INDEX: FIB4 SCORE: 0.46

## 2024-08-23 NOTE — PROGRESS NOTES
Chemotherapy Verification - PRIMARY RN      Height = 1.64 m  Weight = 62.8 kg  BSA = 1.69 m2     Medication: pembrolizumab  Dose: 200 mg set dose  Calculated Dose: 200 mg                             (In mg/m2, AUC, mg/kg)       I confirm this process was performed independently with the BSA and all final chemotherapy dosing calculations congruent.  Any discrepancies of 10% or greater have been addressed with the chemotherapy pharmacist. The resolution of the discrepancy has been documented in the EPIC progress notes.

## 2024-08-23 NOTE — PROGRESS NOTES
Chemotherapy Verification - SECONDARY RN       Height = 164 cm  Weight = 62.8 kg  BSA = 1.69 m2       Medication: Keytruda  Dose: 200 mg (set dose)  Calculated Dose: 200 mg                             (In mg/m2, AUC, mg/kg)       I confirm that this process was performed independently.

## 2024-08-23 NOTE — PROGRESS NOTES
Efrain presents to infusion for cycle 1/ day 1 of pembrolizumab for melanoma. He denies having any new or acute complaints today, reports tolerating his previous treatments well with the exception of back spasms and pain which he denies having at this time.     PIV started with positive return and labs drawn off of IV start. Labs reviewed by RN, within parameters for treatment today.     pembrolizumab given over 30 minutes.  Patient tolerated all with no adverse effects. He did mention that he had a slight discomfort in his back. Dr. Wolf was notified of occurrence and for patient to follow up.     PIV flushed post infusion with positive blood return, removed with tip intact. He then left in stable condition, knows when to return for next appointment.

## 2024-08-26 ENCOUNTER — PATIENT OUTREACH (OUTPATIENT)
Dept: ONCOLOGY | Facility: MEDICAL CENTER | Age: 54
End: 2024-08-26
Payer: COMMERCIAL

## 2024-08-26 DIAGNOSIS — Z65.8 PSYCHOSOCIAL DISTRESS: ICD-10-CM

## 2024-08-26 NOTE — PROGRESS NOTES
"Oncology [facility] Intake    Diagnosis Type: Melanoma   Preferred Language: English   Insurance: [facility]   Primary Care Provider Reviewed: yes  Last Appointment Date: \"7/19/24  Introduced Efrain Ward to Oncology Support Services and provided contact information on 8/26/2024 .  Patient Care Team: Luciano Diaz at Cancer Care Specialist, Linda Hoffman Surgeon, Dr. Willett Skin Cancer Dermatologist   Current Barriers Identified Include: Financial   MyChart Status: Activated  Communication Preferences: MyChart; Best Contact Number: [phone]  Patient Contact's Reviewed: yes     Inbound call from patient returning voice message to complete [facility] intake. Patient states he has friends and family as his support system. Educated patient on Cancer Support Groups & Classes, reviewed the Resource Center, and pt. Agreed to receive [facility] Resource Folder by mail. Verified address. Patient stated he has current financial concerns related to a surgery bill completed at [facility]. Pt. Also stated he works weekdays at [employer], is a single parent, was worried about missing work for treatment, and was able to work with [facility] schedulers to have treatment during the weekend. Administered Distress Screening, patient scored a nine stating \"Things are going in the right direction\". Encouraged patient to reach out to Cancer Support Services if needs arise.     Mailed patient [facility] Resource Folder.   Placed [facility]ERT [facility] MASSIMO referral.    Outcome:  No further needs at this time.          "

## 2024-08-26 NOTE — PROGRESS NOTES
Outbound call to patient for [facility] intake, no answer, left voice message requesting a call back.

## 2024-08-28 ENCOUNTER — PATIENT OUTREACH (OUTPATIENT)
Dept: ONCOLOGY | Facility: MEDICAL CENTER | Age: 54
End: 2024-08-28
Payer: COMMERCIAL

## 2024-08-28 NOTE — PROGRESS NOTES
Patient left voice message for [facility] on 8/27/24 stating he received a notice from his MyChart that his appointments are canceled and is unsure why.    Outbound call to patient returning voice message. Informed patient that according to appointment cancellation note [facility] canceled treatment appointment and treatment is on hold until patient is seen in their office. Patient stated their has been scheduling barriers that conflict with his work schedule. Informed patient that I would forward concern to MASSIMO Conde. Patient reports no further needs at this time.     Outbound call to MASSIMO Conde informing her of this encounter and routed her encounter note. MASSIMO stated she would reach out to patient.

## 2024-08-29 ENCOUNTER — PATIENT OUTREACH (OUTPATIENT)
Dept: ONCOLOGY | Facility: MEDICAL CENTER | Age: 54
End: 2024-08-29
Payer: COMMERCIAL

## 2024-08-29 NOTE — PROGRESS NOTES
Oncology nurse navigator sent the patient a my chart message regarding his request for assistance with Cancer Care Specialists and appointments.  My chart message sent per community outreach Jodi Pulido patient cannot get to his phone whil he is driving.  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    Rayshawn Zuniga,  My name is Amaya Echavarria and I am a Cancer Nurse Navigator.  Jodi called me and wanted me to try to assist you with Cancer Care and your appointments there.  She said that you work and are a  for [employer] and it is difficult to make office appointments.  Does Dr. Wolf require you to be seen every week?  I believe that is what Jodi relayed to me.I am going to try to communicate with Cancer care and see what I can find out or negotiate with them.  I will be in touch.  Below is my contact information.        Amaya Christensen Cancer Nurse Navigator  [address] [place] [address]  [address]  Mailstop: [address]  P: [phone]  F: [phone]  [email or web address]  Here to FIGHT THE GOOD FIGHT.      2nd message sent to the patient            8/29/24  1:09 PM  Rayshawn Zuniga,  So I spoke with Cancer Care Specialists and the last time you were physically seen at their office was June 24 th.  In order to receive any kind of treatment from any [facility] center or any other oncology office the requirements are at least once before each treatment.  It would be the same if you were seen here at [facility].    Cancer Care needs you to call them to make an appointment in order to receive your next treatment.     Would you like me to assist you in scheduling the appointment?  What time of day works for you Monday thorough Friday?  I can try to schedule it as close as possible to your requests.     Thank you,           Amaya Christensen Cancer Nurse Navigator  [address] [place]  [address]  Mailstop: [address]  P: [phone]  F: [phone]  [email or web address]  Here to FIGHT THE GOOD  FIGHT.

## 2024-09-04 ENCOUNTER — PATIENT OUTREACH (OUTPATIENT)
Dept: ONCOLOGY | Facility: MEDICAL CENTER | Age: 54
End: 2024-09-04
Payer: COMMERCIAL

## 2024-09-04 NOTE — PROGRESS NOTES
Oncology nurse navigator called [facility] to assist with scheduling the patient for pretreatment appointments with Dr. Wolf.  Message sent to her nurse to call me back.

## 2024-09-05 ENCOUNTER — PATIENT OUTREACH (OUTPATIENT)
Dept: ONCOLOGY | Facility: MEDICAL CENTER | Age: 54
End: 2024-09-05
Payer: COMMERCIAL

## 2024-09-05 NOTE — PROGRESS NOTES
Oncology nurse navigator called patient and sent a my chart message to him with his new appointment at [facility] on September 18th at 0845 with Jhonny.

## 2024-09-13 ENCOUNTER — APPOINTMENT (OUTPATIENT)
Dept: ONCOLOGY | Facility: MEDICAL CENTER | Age: 54
End: 2024-09-13
Attending: INTERNAL MEDICINE
Payer: COMMERCIAL

## 2024-09-25 RX ORDER — ONDANSETRON 8 MG/1
8 TABLET, ORALLY DISINTEGRATING ORAL PRN
Status: CANCELLED | OUTPATIENT
Start: 2024-09-30

## 2024-09-25 RX ORDER — METHYLPREDNISOLONE SODIUM SUCCINATE 125 MG/2ML
125 INJECTION, POWDER, LYOPHILIZED, FOR SOLUTION INTRAMUSCULAR; INTRAVENOUS PRN
Status: CANCELLED | OUTPATIENT
Start: 2024-09-30

## 2024-09-25 RX ORDER — 0.9 % SODIUM CHLORIDE 0.9 %
10 VIAL (ML) INJECTION PRN
Status: CANCELLED | OUTPATIENT
Start: 2024-09-30

## 2024-09-25 RX ORDER — 0.9 % SODIUM CHLORIDE 0.9 %
3 VIAL (ML) INJECTION PRN
Status: CANCELLED | OUTPATIENT
Start: 2024-09-29

## 2024-09-25 RX ORDER — EPINEPHRINE 1 MG/ML(1)
0.5 AMPUL (ML) INJECTION PRN
Status: CANCELLED | OUTPATIENT
Start: 2024-09-30

## 2024-09-25 RX ORDER — 0.9 % SODIUM CHLORIDE 0.9 %
VIAL (ML) INJECTION PRN
Status: CANCELLED | OUTPATIENT
Start: 2024-09-29

## 2024-09-25 RX ORDER — SODIUM CHLORIDE 9 MG/ML
INJECTION, SOLUTION INTRAVENOUS CONTINUOUS
Status: CANCELLED | OUTPATIENT
Start: 2024-09-30

## 2024-09-25 RX ORDER — 0.9 % SODIUM CHLORIDE 0.9 %
VIAL (ML) INJECTION PRN
Status: CANCELLED | OUTPATIENT
Start: 2024-09-30

## 2024-09-25 RX ORDER — 0.9 % SODIUM CHLORIDE 0.9 %
10 VIAL (ML) INJECTION PRN
Status: CANCELLED | OUTPATIENT
Start: 2024-09-29

## 2024-09-25 RX ORDER — DIPHENHYDRAMINE HYDROCHLORIDE 50 MG/ML
50 INJECTION INTRAMUSCULAR; INTRAVENOUS PRN
Status: CANCELLED | OUTPATIENT
Start: 2024-09-30

## 2024-09-25 RX ORDER — PROCHLORPERAZINE MALEATE 10 MG
10 TABLET ORAL EVERY 6 HOURS PRN
Status: CANCELLED | OUTPATIENT
Start: 2024-09-30

## 2024-09-25 RX ORDER — 0.9 % SODIUM CHLORIDE 0.9 %
3 VIAL (ML) INJECTION PRN
Status: CANCELLED | OUTPATIENT
Start: 2024-09-30

## 2024-09-25 RX ORDER — ONDANSETRON 2 MG/ML
4 INJECTION INTRAMUSCULAR; INTRAVENOUS PRN
Status: CANCELLED | OUTPATIENT
Start: 2024-09-30

## 2024-09-28 NOTE — PROGRESS NOTES
"Pharmacy Chemotherapy Calculation:    Dx: Melanoma        Cycle 2 (delayed due to scheduling)  Previous treatment: C1 8/23/24; started pembrolizuma 5/2024 @ [facility]    Protocol: Pembrolizumab     *Dosing Reference*  Pembrolizumab 200 mg IV over 30 minutes on Day 1  21-day cycle for 12 months (adjuvant treatment)   NCCN Guidelines® for Melanoma: Cutaneous V.2.2024.   Shawna M, et al. Eur J Cancer. 2020;131:68-75.b   Huang RAMSEY et al. N Engl J Med. 2015;372(26):2521-32.a   Mehrdad O et al. Reba Oncol. 2019;30(4):582- 588.a   Marcus CASTRO et al. N Engl J Med. 2018;378(19):7553-1535.a   Kris FERNANDEZ, et al. Future Oncol. 2020;16(3):7461-6925.b    Allergies:  Bactrim [sulfamethoxazole-trimethoprim]     BP (!) 143/84   Pulse 61   Temp 36 °C (96.8 °F) (Temporal)   Resp 18   Ht 1.64 m (5' 4.57\")   Wt 62.5 kg (137 lb 12.6 oz)   SpO2 98%   BMI 23.24 kg/m²  Body surface area is 1.69 meters squared.    Labs 9/29/24:  ANC~ 2770 Plt = 303k   Hgb = 15.4     SCr = 0.88 mg/dL CrCl ~ 86.2 mL/min   AST/ALT/AP = 22/19/80 TBili = 0.4  TSH = in process   Free T4 = in process       Pembrolizumab 200 mg fixed dose = 200 mg   No calc required, OK to treat with final dose =  200 mg IV  "

## 2024-09-29 ENCOUNTER — OUTPATIENT INFUSION SERVICES (OUTPATIENT)
Dept: ONCOLOGY | Facility: MEDICAL CENTER | Age: 54
End: 2024-09-29
Attending: INTERNAL MEDICINE
Payer: COMMERCIAL

## 2024-09-29 VITALS
OXYGEN SATURATION: 98 % | RESPIRATION RATE: 18 BRPM | WEIGHT: 137.79 LBS | TEMPERATURE: 96.8 F | BODY MASS INDEX: 22.96 KG/M2 | SYSTOLIC BLOOD PRESSURE: 143 MMHG | DIASTOLIC BLOOD PRESSURE: 84 MMHG | HEART RATE: 61 BPM | HEIGHT: 65 IN

## 2024-09-29 DIAGNOSIS — C43.8 MALIGNANT MELANOMA OF OVERLAPPING SITES (HCC): ICD-10-CM

## 2024-09-29 LAB
ALBUMIN SERPL BCP-MCNC: 4.1 G/DL (ref 3.2–4.9)
ALBUMIN/GLOB SERPL: 1.5 G/DL
ALP SERPL-CCNC: 80 U/L (ref 30–99)
ALT SERPL-CCNC: 19 U/L (ref 2–50)
ANION GAP SERPL CALC-SCNC: 10 MMOL/L (ref 7–16)
AST SERPL-CCNC: 22 U/L (ref 12–45)
BASOPHILS # BLD AUTO: 0.7 % (ref 0–1.8)
BASOPHILS # BLD: 0.03 K/UL (ref 0–0.12)
BILIRUB SERPL-MCNC: 0.4 MG/DL (ref 0.1–1.5)
BUN SERPL-MCNC: 19 MG/DL (ref 8–22)
CALCIUM ALBUM COR SERPL-MCNC: 8.8 MG/DL (ref 8.5–10.5)
CALCIUM SERPL-MCNC: 8.9 MG/DL (ref 8.5–10.5)
CHLORIDE SERPL-SCNC: 104 MMOL/L (ref 96–112)
CO2 SERPL-SCNC: 24 MMOL/L (ref 20–33)
CREAT SERPL-MCNC: 0.88 MG/DL (ref 0.5–1.4)
EOSINOPHIL # BLD AUTO: 0.07 K/UL (ref 0–0.51)
EOSINOPHIL NFR BLD: 1.7 % (ref 0–6.9)
ERYTHROCYTE [DISTWIDTH] IN BLOOD BY AUTOMATED COUNT: 42.5 FL (ref 35.9–50)
GFR SERPLBLD CREATININE-BSD FMLA CKD-EPI: 103 ML/MIN/1.73 M 2
GLOBULIN SER CALC-MCNC: 2.8 G/DL (ref 1.9–3.5)
GLUCOSE SERPL-MCNC: 102 MG/DL (ref 65–99)
HCT VFR BLD AUTO: 45.6 % (ref 42–52)
HGB BLD-MCNC: 15.4 G/DL (ref 14–18)
IMM GRANULOCYTES # BLD AUTO: 0.01 K/UL (ref 0–0.11)
IMM GRANULOCYTES NFR BLD AUTO: 0.2 % (ref 0–0.9)
LYMPHOCYTES # BLD AUTO: 0.86 K/UL (ref 1–4.8)
LYMPHOCYTES NFR BLD: 20.4 % (ref 22–41)
MCH RBC QN AUTO: 30.3 PG (ref 27–33)
MCHC RBC AUTO-ENTMCNC: 33.8 G/DL (ref 32.3–36.5)
MCV RBC AUTO: 89.8 FL (ref 81.4–97.8)
MONOCYTES # BLD AUTO: 0.48 K/UL (ref 0–0.85)
MONOCYTES NFR BLD AUTO: 11.4 % (ref 0–13.4)
NEUTROPHILS # BLD AUTO: 2.77 K/UL (ref 1.82–7.42)
NEUTROPHILS NFR BLD: 65.6 % (ref 44–72)
NRBC # BLD AUTO: 0 K/UL
NRBC BLD-RTO: 0 /100 WBC (ref 0–0.2)
OUTPT INFUS CBC COMMENT OICOM: ABNORMAL
PLATELET # BLD AUTO: 303 K/UL (ref 164–446)
PMV BLD AUTO: 8.3 FL (ref 9–12.9)
POTASSIUM SERPL-SCNC: 4.3 MMOL/L (ref 3.6–5.5)
PROT SERPL-MCNC: 6.9 G/DL (ref 6–8.2)
RBC # BLD AUTO: 5.08 M/UL (ref 4.7–6.1)
SODIUM SERPL-SCNC: 138 MMOL/L (ref 135–145)
T4 FREE SERPL-MCNC: 1.26 NG/DL (ref 0.93–1.7)
TSH SERPL-ACNC: 1.14 UIU/ML (ref 0.35–5.5)
WBC # BLD AUTO: 4.2 K/UL (ref 4.8–10.8)

## 2024-09-29 PROCEDURE — 85025 COMPLETE CBC W/AUTO DIFF WBC: CPT

## 2024-09-29 PROCEDURE — 84439 ASSAY OF FREE THYROXINE: CPT

## 2024-09-29 PROCEDURE — 700105 HCHG RX REV CODE 258: Performed by: INTERNAL MEDICINE

## 2024-09-29 PROCEDURE — 84443 ASSAY THYROID STIM HORMONE: CPT

## 2024-09-29 PROCEDURE — 700111 HCHG RX REV CODE 636 W/ 250 OVERRIDE (IP): Mod: JZ | Performed by: INTERNAL MEDICINE

## 2024-09-29 PROCEDURE — 96413 CHEMO IV INFUSION 1 HR: CPT

## 2024-09-29 PROCEDURE — 80053 COMPREHEN METABOLIC PANEL: CPT

## 2024-09-29 RX ADMIN — SODIUM CHLORIDE 200 MG: 9 INJECTION, SOLUTION INTRAVENOUS at 09:58

## 2024-09-29 ASSESSMENT — FIBROSIS 4 INDEX: FIB4 SCORE: 0.75

## 2024-09-29 NOTE — PROGRESS NOTES
Pt arrived to IS, ambulatory, for Keytruda. Pt states he is 3 weeks late d/t issues with insurance and MD office. 24g PIV established in the R-AC, positive blood return noted. Labs drawn and reviewed, pt within parameters to treat today. Keytruda infused with no s/sx of adverse reaction. PIV flushed and removed. Pt left IS with no s/sx of distress. Follow up appointment confirmed.

## 2024-09-29 NOTE — PROGRESS NOTES
"Pharmacy Chemotherapy Calculation:    Dx: Melenoma     Protocol: Pembrolizumab     Pembrolizumab 200 mg IV over 30 minutes on Day 1  21-day cycle for 12 months (adjuvant treatment)   NCCNGuidelines® for Melanoma: Cutaneous V.2.2024.   Shawna SINCLAIR , et al. Eur J Cancer. 2020;131:68-75.b   Huang RAMSEY et al. N Engl J Med. 2015;372(26):2521-32.a   Mehrdad O , et al. Reba Oncol. 2019;30(4):582- 588.a   Marcus CASTRO et al. N Engl J Med. 2018;378(19):9003-7438.a   Kris FERNANDEZ , et al. Future Oncol. 2020;16(3):9555-0600.b    Allergies:  Bactrim [sulfamethoxazole-trimethoprim]     BP (!) 143/84   Pulse 61   Temp 36 °C (96.8 °F) (Temporal)   Resp 18   Ht 1.64 m (5' 4.57\")   Wt 62.5 kg (137 lb 12.6 oz)   SpO2 98%   BMI 23.24 kg/m²  Body surface area is 1.69 meters squared.    All lab results 9/29/24 within treatment parameters.  Thyroid panel pending, MD to be notified if abnormal.          Drug Order   (Drug name, dose, route, IV Fluid & volume, frequency, number of doses) Cycle 2 - delayed for scheduling with [facility]   Previous treatment: C1 8/23/24   Medication = Pembrolizumab   Base Dose = 200 mg   Fixed dose, no calculation needed   Final Dose = 200 mg  Route = IV  Fluid & Volume = NS 50 mL  Admin Duration = Over 30 min           Fixed dose okay to treat with final dose       By my signature below, I confirm this process was performed independently with the BSA and all final chemotherapy dosing calculations congruent. I have reviewed the above chemotherapy order and that my calculation of the final dose and BSA (when applicable) corroborate those calculations of the  pharmacist. Discrepancies of 10% or greater in the written dose have been addressed and documented within the [facility] Progress notes.    Fracisco PetersD.    "

## 2024-09-29 NOTE — PROGRESS NOTES
Chemotherapy Verification - PRIMARY RN       Height = 164 cm  Weight = 62.5 kg  BSA = 1.69 m2       Medication: Keytruda  Dose: 200 mg (set dose)  Calculated Dose: 200 mg                             (In mg/m2, AUC, mg/kg)       I confirm this process was performed independently with the BSA and all final chemotherapy dosing calculations congruent.  Any discrepancies of 10% or greater have been addressed with the chemotherapy pharmacist. The resolution of the discrepancy has been documented in the EPIC progress notes.

## 2024-09-29 NOTE — PROGRESS NOTES
Chemotherapy Verification - SECONDARY RN       Height = 1.64m  Weight = 62.5kg  BSA = 1.69m2       Medication: pembrolizumab  Dose: flat dose  Calculated Dose: 200mg                             (In mg/m2, AUC, mg/kg)       I confirm that this process was performed independently.

## 2024-10-16 RX ORDER — METHYLPREDNISOLONE SODIUM SUCCINATE 125 MG/2ML
125 INJECTION, POWDER, LYOPHILIZED, FOR SOLUTION INTRAMUSCULAR; INTRAVENOUS PRN
Status: CANCELLED | OUTPATIENT
Start: 2024-10-21

## 2024-10-16 RX ORDER — 0.9 % SODIUM CHLORIDE 0.9 %
10 VIAL (ML) INJECTION PRN
Status: CANCELLED | OUTPATIENT
Start: 2024-10-20

## 2024-10-16 RX ORDER — 0.9 % SODIUM CHLORIDE 0.9 %
10 VIAL (ML) INJECTION PRN
OUTPATIENT
Start: 2024-11-11

## 2024-10-16 RX ORDER — 0.9 % SODIUM CHLORIDE 0.9 %
10 VIAL (ML) INJECTION PRN
Status: CANCELLED | OUTPATIENT
Start: 2024-10-21

## 2024-10-16 RX ORDER — EPINEPHRINE 1 MG/ML(1)
0.5 AMPUL (ML) INJECTION PRN
Status: CANCELLED | OUTPATIENT
Start: 2024-10-21

## 2024-10-16 RX ORDER — DIPHENHYDRAMINE HYDROCHLORIDE 50 MG/ML
50 INJECTION INTRAMUSCULAR; INTRAVENOUS PRN
Status: CANCELLED | OUTPATIENT
Start: 2024-10-21

## 2024-10-16 RX ORDER — 0.9 % SODIUM CHLORIDE 0.9 %
VIAL (ML) INJECTION PRN
Status: CANCELLED | OUTPATIENT
Start: 2024-10-21

## 2024-10-16 RX ORDER — PROCHLORPERAZINE MALEATE 10 MG
10 TABLET ORAL EVERY 6 HOURS PRN
OUTPATIENT
Start: 2024-11-11

## 2024-10-16 RX ORDER — ONDANSETRON 8 MG/1
8 TABLET, ORALLY DISINTEGRATING ORAL PRN
OUTPATIENT
Start: 2024-11-11

## 2024-10-16 RX ORDER — 0.9 % SODIUM CHLORIDE 0.9 %
VIAL (ML) INJECTION PRN
Status: CANCELLED | OUTPATIENT
Start: 2024-10-20

## 2024-10-16 RX ORDER — 0.9 % SODIUM CHLORIDE 0.9 %
VIAL (ML) INJECTION PRN
OUTPATIENT
Start: 2024-11-11

## 2024-10-16 RX ORDER — 0.9 % SODIUM CHLORIDE 0.9 %
10 VIAL (ML) INJECTION PRN
OUTPATIENT
Start: 2024-11-10

## 2024-10-16 RX ORDER — 0.9 % SODIUM CHLORIDE 0.9 %
3 VIAL (ML) INJECTION PRN
Status: CANCELLED | OUTPATIENT
Start: 2024-10-21

## 2024-10-16 RX ORDER — DIPHENHYDRAMINE HYDROCHLORIDE 50 MG/ML
50 INJECTION INTRAMUSCULAR; INTRAVENOUS PRN
OUTPATIENT
Start: 2024-11-11

## 2024-10-16 RX ORDER — SODIUM CHLORIDE 9 MG/ML
INJECTION, SOLUTION INTRAVENOUS CONTINUOUS
Status: CANCELLED | OUTPATIENT
Start: 2024-10-21

## 2024-10-16 RX ORDER — ONDANSETRON 8 MG/1
8 TABLET, ORALLY DISINTEGRATING ORAL PRN
Status: CANCELLED | OUTPATIENT
Start: 2024-10-21

## 2024-10-16 RX ORDER — 0.9 % SODIUM CHLORIDE 0.9 %
3 VIAL (ML) INJECTION PRN
OUTPATIENT
Start: 2024-11-10

## 2024-10-16 RX ORDER — 0.9 % SODIUM CHLORIDE 0.9 %
VIAL (ML) INJECTION PRN
OUTPATIENT
Start: 2024-11-10

## 2024-10-16 RX ORDER — ONDANSETRON 2 MG/ML
4 INJECTION INTRAMUSCULAR; INTRAVENOUS PRN
Status: CANCELLED | OUTPATIENT
Start: 2024-10-21

## 2024-10-16 RX ORDER — PROCHLORPERAZINE MALEATE 10 MG
10 TABLET ORAL EVERY 6 HOURS PRN
Status: CANCELLED | OUTPATIENT
Start: 2024-10-21

## 2024-10-16 RX ORDER — EPINEPHRINE 1 MG/ML(1)
0.5 AMPUL (ML) INJECTION PRN
OUTPATIENT
Start: 2024-11-11

## 2024-10-16 RX ORDER — 0.9 % SODIUM CHLORIDE 0.9 %
3 VIAL (ML) INJECTION PRN
OUTPATIENT
Start: 2024-11-11

## 2024-10-16 RX ORDER — 0.9 % SODIUM CHLORIDE 0.9 %
3 VIAL (ML) INJECTION PRN
Status: CANCELLED | OUTPATIENT
Start: 2024-10-20

## 2024-10-16 RX ORDER — METHYLPREDNISOLONE SODIUM SUCCINATE 125 MG/2ML
125 INJECTION, POWDER, LYOPHILIZED, FOR SOLUTION INTRAMUSCULAR; INTRAVENOUS PRN
OUTPATIENT
Start: 2024-11-11

## 2024-10-16 RX ORDER — SODIUM CHLORIDE 9 MG/ML
INJECTION, SOLUTION INTRAVENOUS CONTINUOUS
OUTPATIENT
Start: 2024-11-11

## 2024-10-16 RX ORDER — ONDANSETRON 2 MG/ML
4 INJECTION INTRAMUSCULAR; INTRAVENOUS PRN
OUTPATIENT
Start: 2024-11-11

## 2024-10-20 ENCOUNTER — OUTPATIENT INFUSION SERVICES (OUTPATIENT)
Dept: ONCOLOGY | Facility: MEDICAL CENTER | Age: 54
End: 2024-10-20
Attending: INTERNAL MEDICINE
Payer: COMMERCIAL

## 2024-10-20 VITALS
OXYGEN SATURATION: 97 % | TEMPERATURE: 97.8 F | HEIGHT: 65 IN | HEART RATE: 60 BPM | RESPIRATION RATE: 18 BRPM | BODY MASS INDEX: 23.43 KG/M2 | WEIGHT: 140.65 LBS | SYSTOLIC BLOOD PRESSURE: 138 MMHG | DIASTOLIC BLOOD PRESSURE: 80 MMHG

## 2024-10-20 DIAGNOSIS — C43.8 MALIGNANT MELANOMA OF OVERLAPPING SITES (HCC): ICD-10-CM

## 2024-10-20 LAB
ALBUMIN SERPL BCP-MCNC: 4.3 G/DL (ref 3.2–4.9)
ALBUMIN/GLOB SERPL: 1.5 G/DL
ALP SERPL-CCNC: 92 U/L (ref 30–99)
ALT SERPL-CCNC: 13 U/L (ref 2–50)
ANION GAP SERPL CALC-SCNC: 11 MMOL/L (ref 7–16)
AST SERPL-CCNC: 22 U/L (ref 12–45)
BASOPHILS # BLD AUTO: 0.5 % (ref 0–1.8)
BASOPHILS # BLD: 0.02 K/UL (ref 0–0.12)
BILIRUB SERPL-MCNC: 0.5 MG/DL (ref 0.1–1.5)
BUN SERPL-MCNC: 16 MG/DL (ref 8–22)
CALCIUM ALBUM COR SERPL-MCNC: 8.8 MG/DL (ref 8.5–10.5)
CALCIUM SERPL-MCNC: 9 MG/DL (ref 8.5–10.5)
CHLORIDE SERPL-SCNC: 104 MMOL/L (ref 96–112)
CO2 SERPL-SCNC: 24 MMOL/L (ref 20–33)
CREAT SERPL-MCNC: 0.87 MG/DL (ref 0.5–1.4)
EOSINOPHIL # BLD AUTO: 0.08 K/UL (ref 0–0.51)
EOSINOPHIL NFR BLD: 1.9 % (ref 0–6.9)
ERYTHROCYTE [DISTWIDTH] IN BLOOD BY AUTOMATED COUNT: 43.5 FL (ref 35.9–50)
GFR SERPLBLD CREATININE-BSD FMLA CKD-EPI: 103 ML/MIN/1.73 M 2
GLOBULIN SER CALC-MCNC: 2.9 G/DL (ref 1.9–3.5)
GLUCOSE SERPL-MCNC: 118 MG/DL (ref 65–99)
HCT VFR BLD AUTO: 46.2 % (ref 42–52)
HGB BLD-MCNC: 15.8 G/DL (ref 14–18)
IMM GRANULOCYTES # BLD AUTO: 0.01 K/UL (ref 0–0.11)
IMM GRANULOCYTES NFR BLD AUTO: 0.2 % (ref 0–0.9)
LYMPHOCYTES # BLD AUTO: 0.97 K/UL (ref 1–4.8)
LYMPHOCYTES NFR BLD: 23.1 % (ref 22–41)
MCH RBC QN AUTO: 30.9 PG (ref 27–33)
MCHC RBC AUTO-ENTMCNC: 34.2 G/DL (ref 32.3–36.5)
MCV RBC AUTO: 90.4 FL (ref 81.4–97.8)
MONOCYTES # BLD AUTO: 0.42 K/UL (ref 0–0.85)
MONOCYTES NFR BLD AUTO: 10 % (ref 0–13.4)
NEUTROPHILS # BLD AUTO: 2.7 K/UL (ref 1.82–7.42)
NEUTROPHILS NFR BLD: 64.3 % (ref 44–72)
NRBC # BLD AUTO: 0 K/UL
NRBC BLD-RTO: 0 /100 WBC (ref 0–0.2)
OUTPT INFUS CBC COMMENT OICOM: ABNORMAL
PLATELET # BLD AUTO: 253 K/UL (ref 164–446)
PMV BLD AUTO: 8.5 FL (ref 9–12.9)
POTASSIUM SERPL-SCNC: 4.3 MMOL/L (ref 3.6–5.5)
PROT SERPL-MCNC: 7.2 G/DL (ref 6–8.2)
RBC # BLD AUTO: 5.11 M/UL (ref 4.7–6.1)
SODIUM SERPL-SCNC: 139 MMOL/L (ref 135–145)
T4 FREE SERPL-MCNC: 1.02 NG/DL (ref 0.93–1.7)
TSH SERPL-ACNC: 1.41 UIU/ML (ref 0.35–5.5)
WBC # BLD AUTO: 4.2 K/UL (ref 4.8–10.8)

## 2024-10-20 PROCEDURE — 85025 COMPLETE CBC W/AUTO DIFF WBC: CPT

## 2024-10-20 PROCEDURE — 80053 COMPREHEN METABOLIC PANEL: CPT

## 2024-10-20 PROCEDURE — 84443 ASSAY THYROID STIM HORMONE: CPT

## 2024-10-20 PROCEDURE — 700111 HCHG RX REV CODE 636 W/ 250 OVERRIDE (IP): Mod: JZ | Performed by: INTERNAL MEDICINE

## 2024-10-20 PROCEDURE — 96413 CHEMO IV INFUSION 1 HR: CPT

## 2024-10-20 PROCEDURE — 700105 HCHG RX REV CODE 258: Performed by: INTERNAL MEDICINE

## 2024-10-20 PROCEDURE — 84439 ASSAY OF FREE THYROXINE: CPT

## 2024-10-20 RX ADMIN — SODIUM CHLORIDE 200 MG: 9 INJECTION, SOLUTION INTRAVENOUS at 09:26

## 2024-10-20 ASSESSMENT — FIBROSIS 4 INDEX: FIB4 SCORE: 0.87

## 2024-11-10 ENCOUNTER — APPOINTMENT (OUTPATIENT)
Dept: ONCOLOGY | Facility: MEDICAL CENTER | Age: 54
End: 2024-11-10
Payer: COMMERCIAL

## 2024-11-15 NOTE — PROGRESS NOTES
"Pharmacy Chemotherapy Calculation:    Dx: Melanoma        Cycle 4   Previous treatment: C3 10/20/24; started pembrolizuma 5/2024 @ [facility]    Protocol: Pembrolizumab     *Dosing Reference*  Pembrolizumab 200 mg IV over 30 minutes on Day 1  21-day cycle for 12 months (adjuvant treatment)   NCCN Guidelines® for Melanoma: Cutaneous V.2.2024.   Shawna M, et al. Eur J Cancer. 2020;131:68-75.b   Huang RAMSEY, et al. N Engl J Med. 2015;372(26):2521-32.a   Mehrdad O et al. Reba Oncol. 2019;30(4):582- 588.a   Marcus CASTRO et al. N Engl J Med. 2018;378(19):1350-7466.a   Kris FERNANDEZ, et al. Future Oncol. 2020;16(3):9123-7272.b    Allergies:  Bactrim [sulfamethoxazole-trimethoprim]     /85   Pulse 65   Temp 36.5 °C (97.7 °F) (Temporal)   Resp 18   Ht 1.64 m (5' 4.57\")   Wt 64.8 kg (142 lb 13.7 oz)   SpO2 99%   BMI 24.09 kg/m²  Body surface area is 1.72 meters squared.    Labs 11/16/24:  ANC~ 2940 Plt = 262k   Hgb = 15.7     SCr = 0.97 mg/dL CrCl ~ 81 mL/min   AST/ALT/AP = 21/16/81 TBili = 0.5  TSH = 1.09   Free T4 = 1.03      Pembrolizumab 200 mg fixed dose = 200 mg   No calc required, OK to treat with final dose =  200 mg IV  "

## 2024-11-16 ENCOUNTER — OUTPATIENT INFUSION SERVICES (OUTPATIENT)
Dept: ONCOLOGY | Facility: MEDICAL CENTER | Age: 54
End: 2024-11-16
Attending: INTERNAL MEDICINE
Payer: COMMERCIAL

## 2024-11-16 VITALS
DIASTOLIC BLOOD PRESSURE: 85 MMHG | SYSTOLIC BLOOD PRESSURE: 138 MMHG | BODY MASS INDEX: 23.8 KG/M2 | OXYGEN SATURATION: 99 % | RESPIRATION RATE: 18 BRPM | TEMPERATURE: 97.7 F | WEIGHT: 142.86 LBS | HEART RATE: 65 BPM | HEIGHT: 65 IN

## 2024-11-16 DIAGNOSIS — C43.8 MALIGNANT MELANOMA OF OVERLAPPING SITES (HCC): ICD-10-CM

## 2024-11-16 LAB
ALBUMIN SERPL BCP-MCNC: 4.4 G/DL (ref 3.2–4.9)
ALBUMIN/GLOB SERPL: 1.6 G/DL
ALP SERPL-CCNC: 81 U/L (ref 30–99)
ALT SERPL-CCNC: 16 U/L (ref 2–50)
ANION GAP SERPL CALC-SCNC: 11 MMOL/L (ref 7–16)
AST SERPL-CCNC: 21 U/L (ref 12–45)
BASOPHILS # BLD AUTO: 0.9 % (ref 0–1.8)
BASOPHILS # BLD: 0.04 K/UL (ref 0–0.12)
BILIRUB SERPL-MCNC: 0.5 MG/DL (ref 0.1–1.5)
BUN SERPL-MCNC: 14 MG/DL (ref 8–22)
CALCIUM ALBUM COR SERPL-MCNC: 8.8 MG/DL (ref 8.5–10.5)
CALCIUM SERPL-MCNC: 9.1 MG/DL (ref 8.5–10.5)
CHLORIDE SERPL-SCNC: 104 MMOL/L (ref 96–112)
CO2 SERPL-SCNC: 22 MMOL/L (ref 20–33)
CREAT SERPL-MCNC: 0.97 MG/DL (ref 0.5–1.4)
EOSINOPHIL # BLD AUTO: 0.1 K/UL (ref 0–0.51)
EOSINOPHIL NFR BLD: 2.2 % (ref 0–6.9)
ERYTHROCYTE [DISTWIDTH] IN BLOOD BY AUTOMATED COUNT: 42.9 FL (ref 35.9–50)
GFR SERPLBLD CREATININE-BSD FMLA CKD-EPI: 94 ML/MIN/1.73 M 2
GLOBULIN SER CALC-MCNC: 2.7 G/DL (ref 1.9–3.5)
GLUCOSE SERPL-MCNC: 121 MG/DL (ref 65–99)
HCT VFR BLD AUTO: 46.6 % (ref 42–52)
HGB BLD-MCNC: 15.7 G/DL (ref 14–18)
IMM GRANULOCYTES # BLD AUTO: 0.02 K/UL (ref 0–0.11)
IMM GRANULOCYTES NFR BLD AUTO: 0.4 % (ref 0–0.9)
LYMPHOCYTES # BLD AUTO: 1.13 K/UL (ref 1–4.8)
LYMPHOCYTES NFR BLD: 24.3 % (ref 22–41)
MCH RBC QN AUTO: 30.4 PG (ref 27–33)
MCHC RBC AUTO-ENTMCNC: 33.7 G/DL (ref 32.3–36.5)
MCV RBC AUTO: 90.1 FL (ref 81.4–97.8)
MONOCYTES # BLD AUTO: 0.42 K/UL (ref 0–0.85)
MONOCYTES NFR BLD AUTO: 9 % (ref 0–13.4)
NEUTROPHILS # BLD AUTO: 2.94 K/UL (ref 1.82–7.42)
NEUTROPHILS NFR BLD: 63.2 % (ref 44–72)
NRBC # BLD AUTO: 0 K/UL
NRBC BLD-RTO: 0 /100 WBC (ref 0–0.2)
OUTPT INFUS CBC COMMENT OICOM: ABNORMAL
PLATELET # BLD AUTO: 262 K/UL (ref 164–446)
PMV BLD AUTO: 8.6 FL (ref 9–12.9)
POTASSIUM SERPL-SCNC: 4.3 MMOL/L (ref 3.6–5.5)
PROT SERPL-MCNC: 7.1 G/DL (ref 6–8.2)
RBC # BLD AUTO: 5.17 M/UL (ref 4.7–6.1)
SODIUM SERPL-SCNC: 137 MMOL/L (ref 135–145)
T4 FREE SERPL-MCNC: 1.03 NG/DL (ref 0.93–1.7)
TSH SERPL-ACNC: 1.09 UIU/ML (ref 0.35–5.5)
WBC # BLD AUTO: 4.7 K/UL (ref 4.8–10.8)

## 2024-11-16 PROCEDURE — 700111 HCHG RX REV CODE 636 W/ 250 OVERRIDE (IP): Mod: JZ | Performed by: INTERNAL MEDICINE

## 2024-11-16 PROCEDURE — 85025 COMPLETE CBC W/AUTO DIFF WBC: CPT

## 2024-11-16 PROCEDURE — 84439 ASSAY OF FREE THYROXINE: CPT

## 2024-11-16 PROCEDURE — 80053 COMPREHEN METABOLIC PANEL: CPT

## 2024-11-16 PROCEDURE — 700105 HCHG RX REV CODE 258: Performed by: INTERNAL MEDICINE

## 2024-11-16 PROCEDURE — 84443 ASSAY THYROID STIM HORMONE: CPT

## 2024-11-16 PROCEDURE — 96413 CHEMO IV INFUSION 1 HR: CPT

## 2024-11-16 RX ADMIN — SODIUM CHLORIDE 200 MG: 9 INJECTION, SOLUTION INTRAVENOUS at 09:19

## 2024-11-16 ASSESSMENT — FIBROSIS 4 INDEX: FIB4 SCORE: 1.25

## 2024-11-16 NOTE — PROGRESS NOTES
"Pharmacy Chemotherapy Calculation:    Dx: Melenoma     Protocol: Pembrolizumab       Pembrolizumab 200 mg IV over 30 minutes on Day 1  21-day cycle for 12 months (adjuvant treatment)   NCCNGuidelines® for Melanoma: Cutaneous V.2.2024.   Shawna M , et al. Eur J Cancer. 2020;131:68-75.b   Huang RAMSEY et al. N Engl J Med. 2015;372(26):2521-32.a   Mehrdad O , et al. Reba Oncol. 2019;30(4):582- 588.a   Marcus CASTRO et al. N Engl J Med. 2018;378(19):0808-7854.a   Kris ALEJANDRAJ , et al. Future Oncol. 2020;16(3):2765-3361.b    Allergies:  Bactrim [sulfamethoxazole-trimethoprim]     /85   Pulse 65   Temp 36.5 °C (97.7 °F) (Temporal)   Resp 18   Ht 1.64 m (5' 4.57\")   Wt 64.8 kg (142 lb 13.7 oz)   SpO2 99%   BMI 24.09 kg/m²  Body surface area is 1.72 meters squared.    Labs 11/16/24  ANC~ 2940 Plt = 262k   Hgb = 15.7     SCr = 0.97 mg/dL CrCl ~ 81 mL/min   LFT's = WNLs  TBili = 0.5   TSH = 1.09 Free T4 = 1.03    Drug Order   (Drug name, dose, route, IV Fluid & volume, frequency, number of doses) Cycle 4  Previous treatment: C3 10/20/24   Medication = Pembrolizumab   Base Dose = 200 mg   Fixed dose, no calculation needed   Final Dose = 200 mg  Route = IV  Fluid & Volume = NS 50 mL  Admin Duration = Over 30 min           Fixed dose okay to treat with final dose     By my signature below, I confirm this process was performed independently with the BSA and all final chemotherapy dosing calculations congruent. I have reviewed the above chemotherapy order and that my calculation of the final dose and BSA (when applicable) corroborate those calculations of the  pharmacist. Discrepancies of 10% or greater in the written dose have been addressed and documented within the [facility] Progress notes.    Artem Cantrell, PharmD    "

## 2024-11-16 NOTE — PROGRESS NOTES
Chemotherapy Verification - SECONDARY RN       Height = 1.64m  Weight = 64.8kg  BSA = 1.72m2       Medication: pembrolizumab  Dose: flat dose  Calculated Dose: 200mg                             (In mg/m2, AUC, mg/kg)       I confirm that this process was performed independently.

## 2024-11-16 NOTE — PROGRESS NOTES
Pt arrived to IS, ambulatory, for Keytruda. 24g PIV established in the R-AC, positive blood return noted. Labs drawn and reviewed, pt within parameters to treat today. Keytruda infused with no s/sx of adverse reaction. PIV flushed and removed. Pt left IS with no s/sx of distress. Follow up appointment confirmed.

## 2024-11-16 NOTE — PROGRESS NOTES
Chemotherapy Verification - PRIMARY RN      Height = 164 cm  Weight = 64.8 kg  BSA = 1.72 m2       Medication: Keytruda  Dose: 200 mg (Set dose)  Calculated Dose: 200 mg                             (In mg/m2, AUC, mg/kg)       I confirm this process was performed independently with the BSA and all final chemotherapy dosing calculations congruent.  Any discrepancies of 10% or greater have been addressed with the chemotherapy pharmacist. The resolution of the discrepancy has been documented in the EPIC progress notes.

## 2024-12-01 ENCOUNTER — APPOINTMENT (OUTPATIENT)
Dept: ONCOLOGY | Facility: MEDICAL CENTER | Age: 54
End: 2024-12-01
Payer: COMMERCIAL

## 2024-12-04 NOTE — PROGRESS NOTES
"Pharmacy Chemotherapy Calculation:    Dx: Melanoma        Cycle 5  Previous treatment: C4 11/16/24; started pembrolizuma 5/2024 @ [facility]    Protocol: Pembrolizumab     *Dosing Reference*  Pembrolizumab 200 mg IV over 30 minutes on Day 1  21-day cycle for 12 months (adjuvant treatment)   NCCN Guidelines® for Melanoma: Cutaneous V.2.2024.   Shawna M, et al. Eur J Cancer. 2020;131:68-75.b   Huang RAMSEY, et al. N Engl J Med. 2015;372(26):2521-32.a   Mehrdad O, et al. Reba Oncol. 2019;30(4):582- 588.a   Marcus CASTRO, et al. N Engl J Med. 2018;378(19):9904-4795.a   Kris FERNANDEZ, et al. Future Oncol. 2020;16(3):0417-9402.b    Allergies:  Bactrim [sulfamethoxazole-trimethoprim]     BP (!) 134/91   Pulse 75   Temp 36.7 °C (98.1 °F) (Temporal)   Resp 18   Ht 1.64 m (5' 4.57\")   Wt 65 kg (143 lb 4.8 oz)   SpO2 98%   BMI 24.17 kg/m²  Body surface area is 1.72 meters squared.    Labs 12/8/24:  ANC~ 3560 Plt = 260k   Hgb = 16.1     SCr = 1.37 mg/dL CrCl ~ 57.5 mL/min   AST/ALT/AP = 20/16/81 TBili = 0.4  TSH = 0.629 Free T4 = 1.04    Pembrolizumab 200 mg fixed dose = 200 mg   No calc required, OK to treat with final dose =  200 mg IV    Juice Ramírez, PharmD  "

## 2024-12-05 RX ORDER — METHYLPREDNISOLONE SODIUM SUCCINATE 125 MG/2ML
125 INJECTION, POWDER, LYOPHILIZED, FOR SOLUTION INTRAMUSCULAR; INTRAVENOUS PRN
Status: CANCELLED | OUTPATIENT
Start: 2024-12-07

## 2024-12-05 RX ORDER — 0.9 % SODIUM CHLORIDE 0.9 %
10 VIAL (ML) INJECTION PRN
Status: CANCELLED | OUTPATIENT
Start: 2024-12-07

## 2024-12-05 RX ORDER — 0.9 % SODIUM CHLORIDE 0.9 %
VIAL (ML) INJECTION PRN
Status: CANCELLED | OUTPATIENT
Start: 2024-12-06

## 2024-12-05 RX ORDER — ONDANSETRON 8 MG/1
8 TABLET, ORALLY DISINTEGRATING ORAL PRN
Status: CANCELLED | OUTPATIENT
Start: 2024-12-07

## 2024-12-05 RX ORDER — SODIUM CHLORIDE 9 MG/ML
INJECTION, SOLUTION INTRAVENOUS CONTINUOUS
Status: CANCELLED | OUTPATIENT
Start: 2024-12-07

## 2024-12-05 RX ORDER — 0.9 % SODIUM CHLORIDE 0.9 %
VIAL (ML) INJECTION PRN
Status: CANCELLED | OUTPATIENT
Start: 2024-12-07

## 2024-12-05 RX ORDER — 0.9 % SODIUM CHLORIDE 0.9 %
3 VIAL (ML) INJECTION PRN
Status: CANCELLED | OUTPATIENT
Start: 2024-12-06

## 2024-12-05 RX ORDER — ONDANSETRON 2 MG/ML
4 INJECTION INTRAMUSCULAR; INTRAVENOUS PRN
Status: CANCELLED | OUTPATIENT
Start: 2024-12-07

## 2024-12-05 RX ORDER — 0.9 % SODIUM CHLORIDE 0.9 %
10 VIAL (ML) INJECTION PRN
Status: CANCELLED | OUTPATIENT
Start: 2024-12-06

## 2024-12-05 RX ORDER — EPINEPHRINE 1 MG/ML(1)
0.5 AMPUL (ML) INJECTION PRN
Status: CANCELLED | OUTPATIENT
Start: 2024-12-07

## 2024-12-05 RX ORDER — DIPHENHYDRAMINE HYDROCHLORIDE 50 MG/ML
50 INJECTION INTRAMUSCULAR; INTRAVENOUS PRN
Status: CANCELLED | OUTPATIENT
Start: 2024-12-07

## 2024-12-05 RX ORDER — PROCHLORPERAZINE MALEATE 10 MG
10 TABLET ORAL EVERY 6 HOURS PRN
Status: CANCELLED | OUTPATIENT
Start: 2024-12-07

## 2024-12-05 RX ORDER — 0.9 % SODIUM CHLORIDE 0.9 %
3 VIAL (ML) INJECTION PRN
Status: CANCELLED | OUTPATIENT
Start: 2024-12-07

## 2024-12-08 ENCOUNTER — OUTPATIENT INFUSION SERVICES (OUTPATIENT)
Dept: ONCOLOGY | Facility: MEDICAL CENTER | Age: 54
End: 2024-12-08
Attending: INTERNAL MEDICINE
Payer: COMMERCIAL

## 2024-12-08 VITALS
WEIGHT: 143.3 LBS | RESPIRATION RATE: 18 BRPM | BODY MASS INDEX: 23.88 KG/M2 | HEIGHT: 65 IN | HEART RATE: 75 BPM | DIASTOLIC BLOOD PRESSURE: 91 MMHG | TEMPERATURE: 98.1 F | SYSTOLIC BLOOD PRESSURE: 134 MMHG | OXYGEN SATURATION: 98 %

## 2024-12-08 DIAGNOSIS — C43.8 MALIGNANT MELANOMA OF OVERLAPPING SITES (HCC): ICD-10-CM

## 2024-12-08 LAB
ALBUMIN SERPL BCP-MCNC: 4.4 G/DL (ref 3.2–4.9)
ALBUMIN/GLOB SERPL: 1.7 G/DL
ALP SERPL-CCNC: 81 U/L (ref 30–99)
ALT SERPL-CCNC: 16 U/L (ref 2–50)
ANION GAP SERPL CALC-SCNC: 11 MMOL/L (ref 7–16)
AST SERPL-CCNC: 20 U/L (ref 12–45)
BASOPHILS # BLD AUTO: 0.6 % (ref 0–1.8)
BASOPHILS # BLD: 0.03 K/UL (ref 0–0.12)
BILIRUB SERPL-MCNC: 0.4 MG/DL (ref 0.1–1.5)
BUN SERPL-MCNC: 18 MG/DL (ref 8–22)
CALCIUM ALBUM COR SERPL-MCNC: 8.6 MG/DL (ref 8.5–10.5)
CALCIUM SERPL-MCNC: 8.9 MG/DL (ref 8.5–10.5)
CHLORIDE SERPL-SCNC: 105 MMOL/L (ref 96–112)
CO2 SERPL-SCNC: 24 MMOL/L (ref 20–33)
CREAT SERPL-MCNC: 1.37 MG/DL (ref 0.5–1.4)
EOSINOPHIL # BLD AUTO: 0.07 K/UL (ref 0–0.51)
EOSINOPHIL NFR BLD: 1.3 % (ref 0–6.9)
ERYTHROCYTE [DISTWIDTH] IN BLOOD BY AUTOMATED COUNT: 42.4 FL (ref 35.9–50)
GFR SERPLBLD CREATININE-BSD FMLA CKD-EPI: 62 ML/MIN/1.73 M 2
GLOBULIN SER CALC-MCNC: 2.6 G/DL (ref 1.9–3.5)
GLUCOSE SERPL-MCNC: 102 MG/DL (ref 65–99)
HCT VFR BLD AUTO: 47.1 % (ref 42–52)
HGB BLD-MCNC: 16.1 G/DL (ref 14–18)
IMM GRANULOCYTES # BLD AUTO: 0.02 K/UL (ref 0–0.11)
IMM GRANULOCYTES NFR BLD AUTO: 0.4 % (ref 0–0.9)
LYMPHOCYTES # BLD AUTO: 1.09 K/UL (ref 1–4.8)
LYMPHOCYTES NFR BLD: 21 % (ref 22–41)
MCH RBC QN AUTO: 31 PG (ref 27–33)
MCHC RBC AUTO-ENTMCNC: 34.2 G/DL (ref 32.3–36.5)
MCV RBC AUTO: 90.8 FL (ref 81.4–97.8)
MONOCYTES # BLD AUTO: 0.42 K/UL (ref 0–0.85)
MONOCYTES NFR BLD AUTO: 8.1 % (ref 0–13.4)
NEUTROPHILS # BLD AUTO: 3.56 K/UL (ref 1.82–7.42)
NEUTROPHILS NFR BLD: 68.6 % (ref 44–72)
NRBC # BLD AUTO: 0 K/UL
NRBC BLD-RTO: 0 /100 WBC (ref 0–0.2)
OUTPT INFUS CBC COMMENT OICOM: ABNORMAL
PLATELET # BLD AUTO: 260 K/UL (ref 164–446)
PMV BLD AUTO: 8.6 FL (ref 9–12.9)
POTASSIUM SERPL-SCNC: 4 MMOL/L (ref 3.6–5.5)
PROT SERPL-MCNC: 7 G/DL (ref 6–8.2)
RBC # BLD AUTO: 5.19 M/UL (ref 4.7–6.1)
SODIUM SERPL-SCNC: 140 MMOL/L (ref 135–145)
T4 FREE SERPL-MCNC: 1.04 NG/DL (ref 0.93–1.7)
TSH SERPL-ACNC: 0.63 UIU/ML (ref 0.35–5.5)
WBC # BLD AUTO: 5.2 K/UL (ref 4.8–10.8)

## 2024-12-08 PROCEDURE — 85025 COMPLETE CBC W/AUTO DIFF WBC: CPT

## 2024-12-08 PROCEDURE — 96413 CHEMO IV INFUSION 1 HR: CPT

## 2024-12-08 PROCEDURE — 84443 ASSAY THYROID STIM HORMONE: CPT

## 2024-12-08 PROCEDURE — 700105 HCHG RX REV CODE 258: Performed by: INTERNAL MEDICINE

## 2024-12-08 PROCEDURE — 80053 COMPREHEN METABOLIC PANEL: CPT

## 2024-12-08 PROCEDURE — 84439 ASSAY OF FREE THYROXINE: CPT

## 2024-12-08 PROCEDURE — 700111 HCHG RX REV CODE 636 W/ 250 OVERRIDE (IP): Mod: JZ | Performed by: INTERNAL MEDICINE

## 2024-12-08 RX ADMIN — SODIUM CHLORIDE 200 MG: 9 INJECTION, SOLUTION INTRAVENOUS at 13:06

## 2024-12-08 ASSESSMENT — FIBROSIS 4 INDEX: FIB4 SCORE: 1.04

## 2024-12-08 NOTE — PROGRESS NOTES
Chemotherapy Verification - PRIMARY RN      Height = 164cm  Weight = 65kg  BSA = 1.72m^2       Medication: Keytruda  Dose: fixed dose 200 mg  Calculated Dose: 200 mg                                     I confirm this process was performed independently with the BSA and all final chemotherapy dosing calculations congruent.  Any discrepancies of 10% or greater have been addressed with the chemotherapy pharmacist. The resolution of the discrepancy has been documented in the EPIC progress notes.

## 2024-12-08 NOTE — PROGRESS NOTES
Pt arrived ambulatory to [facility] for D1C5 Keytruda infusion for Melanoma. POC discussed with pt and he agrees with plan.     PIV established, brisk blood return noted, labs drawn as ordered. Results reviewed, ok to proceed with treatment. Pt medicated per MAR. Pt tolerated treatment without s/s adverse reaction. PIV dc'd catheter tip intact, gauze and coban dressing applied.     Pt discharged to self care, [facility]. Pt's next appointment confirmed 12/28/2024.

## 2024-12-08 NOTE — PROGRESS NOTES
Chemotherapy Verification - SECONDARY RN       Height = 1.64 m  Weight = 65 kg  BSA = 1.72 m2       Medication: keytruda  Dose: 200 mg   Calculated Dose: 200 mg set dose                             (In mg/m2, AUC, mg/kg)     I confirm that this process was performed independently.

## 2024-12-22 ENCOUNTER — APPOINTMENT (OUTPATIENT)
Dept: ONCOLOGY | Facility: MEDICAL CENTER | Age: 54
End: 2024-12-22
Payer: COMMERCIAL

## 2024-12-27 RX ORDER — 0.9 % SODIUM CHLORIDE 0.9 %
3 VIAL (ML) INJECTION PRN
Status: CANCELLED | OUTPATIENT
Start: 2024-12-28

## 2024-12-27 RX ORDER — METHYLPREDNISOLONE SODIUM SUCCINATE 125 MG/2ML
125 INJECTION, POWDER, LYOPHILIZED, FOR SOLUTION INTRAMUSCULAR; INTRAVENOUS PRN
Status: CANCELLED | OUTPATIENT
Start: 2024-12-28

## 2024-12-27 RX ORDER — ONDANSETRON 2 MG/ML
4 INJECTION INTRAMUSCULAR; INTRAVENOUS PRN
Status: CANCELLED | OUTPATIENT
Start: 2024-12-28

## 2024-12-27 RX ORDER — 0.9 % SODIUM CHLORIDE 0.9 %
3 VIAL (ML) INJECTION PRN
Status: CANCELLED | OUTPATIENT
Start: 2024-12-27

## 2024-12-27 RX ORDER — 0.9 % SODIUM CHLORIDE 0.9 %
10 VIAL (ML) INJECTION PRN
Status: CANCELLED | OUTPATIENT
Start: 2024-12-27

## 2024-12-27 RX ORDER — 0.9 % SODIUM CHLORIDE 0.9 %
10 VIAL (ML) INJECTION PRN
Status: CANCELLED | OUTPATIENT
Start: 2024-12-28

## 2024-12-27 RX ORDER — ONDANSETRON 8 MG/1
8 TABLET, ORALLY DISINTEGRATING ORAL PRN
Status: CANCELLED | OUTPATIENT
Start: 2024-12-28

## 2024-12-27 RX ORDER — PROCHLORPERAZINE MALEATE 10 MG
10 TABLET ORAL EVERY 6 HOURS PRN
Status: CANCELLED | OUTPATIENT
Start: 2024-12-28

## 2024-12-27 RX ORDER — SODIUM CHLORIDE 9 MG/ML
INJECTION, SOLUTION INTRAVENOUS CONTINUOUS
Status: CANCELLED | OUTPATIENT
Start: 2024-12-28

## 2024-12-27 RX ORDER — DIPHENHYDRAMINE HYDROCHLORIDE 50 MG/ML
50 INJECTION INTRAMUSCULAR; INTRAVENOUS PRN
Status: CANCELLED | OUTPATIENT
Start: 2024-12-28

## 2024-12-27 RX ORDER — EPINEPHRINE 1 MG/ML(1)
0.5 AMPUL (ML) INJECTION PRN
Status: CANCELLED | OUTPATIENT
Start: 2024-12-28

## 2024-12-27 RX ORDER — 0.9 % SODIUM CHLORIDE 0.9 %
VIAL (ML) INJECTION PRN
Status: CANCELLED | OUTPATIENT
Start: 2024-12-27

## 2024-12-27 RX ORDER — 0.9 % SODIUM CHLORIDE 0.9 %
VIAL (ML) INJECTION PRN
Status: CANCELLED | OUTPATIENT
Start: 2024-12-28

## 2024-12-28 ENCOUNTER — APPOINTMENT (OUTPATIENT)
Dept: ONCOLOGY | Facility: MEDICAL CENTER | Age: 54
End: 2024-12-28
Attending: INTERNAL MEDICINE
Payer: COMMERCIAL

## 2024-12-28 VITALS
OXYGEN SATURATION: 95 % | SYSTOLIC BLOOD PRESSURE: 137 MMHG | BODY MASS INDEX: 24.06 KG/M2 | HEART RATE: 62 BPM | DIASTOLIC BLOOD PRESSURE: 81 MMHG | HEIGHT: 65 IN | RESPIRATION RATE: 16 BRPM | TEMPERATURE: 97.9 F | WEIGHT: 144.4 LBS

## 2024-12-28 DIAGNOSIS — C43.8 MALIGNANT MELANOMA OF OVERLAPPING SITES (HCC): ICD-10-CM

## 2024-12-28 LAB
ALBUMIN SERPL BCP-MCNC: 4.3 G/DL (ref 3.2–4.9)
ALBUMIN/GLOB SERPL: 1.8 G/DL
ALP SERPL-CCNC: 75 U/L (ref 30–99)
ALT SERPL-CCNC: 16 U/L (ref 2–50)
ANION GAP SERPL CALC-SCNC: 11 MMOL/L (ref 7–16)
AST SERPL-CCNC: 16 U/L (ref 12–45)
BASOPHILS # BLD AUTO: 0.9 % (ref 0–1.8)
BASOPHILS # BLD: 0.04 K/UL (ref 0–0.12)
BILIRUB SERPL-MCNC: 0.4 MG/DL (ref 0.1–1.5)
BUN SERPL-MCNC: 15 MG/DL (ref 8–22)
CALCIUM ALBUM COR SERPL-MCNC: 8.5 MG/DL (ref 8.5–10.5)
CALCIUM SERPL-MCNC: 8.7 MG/DL (ref 8.5–10.5)
CHLORIDE SERPL-SCNC: 106 MMOL/L (ref 96–112)
CO2 SERPL-SCNC: 22 MMOL/L (ref 20–33)
CREAT SERPL-MCNC: 0.87 MG/DL (ref 0.5–1.4)
EOSINOPHIL # BLD AUTO: 0.11 K/UL (ref 0–0.51)
EOSINOPHIL NFR BLD: 2.5 % (ref 0–6.9)
ERYTHROCYTE [DISTWIDTH] IN BLOOD BY AUTOMATED COUNT: 41.7 FL (ref 35.9–50)
GFR SERPLBLD CREATININE-BSD FMLA CKD-EPI: 103 ML/MIN/1.73 M 2
GLOBULIN SER CALC-MCNC: 2.4 G/DL (ref 1.9–3.5)
GLUCOSE SERPL-MCNC: 105 MG/DL (ref 65–99)
HCT VFR BLD AUTO: 46 % (ref 42–52)
HGB BLD-MCNC: 15.2 G/DL (ref 14–18)
IMM GRANULOCYTES # BLD AUTO: 0.02 K/UL (ref 0–0.11)
IMM GRANULOCYTES NFR BLD AUTO: 0.5 % (ref 0–0.9)
LYMPHOCYTES # BLD AUTO: 1.08 K/UL (ref 1–4.8)
LYMPHOCYTES NFR BLD: 24.7 % (ref 22–41)
MCH RBC QN AUTO: 30.1 PG (ref 27–33)
MCHC RBC AUTO-ENTMCNC: 33 G/DL (ref 32.3–36.5)
MCV RBC AUTO: 91.1 FL (ref 81.4–97.8)
MONOCYTES # BLD AUTO: 0.45 K/UL (ref 0–0.85)
MONOCYTES NFR BLD AUTO: 10.3 % (ref 0–13.4)
NEUTROPHILS # BLD AUTO: 2.67 K/UL (ref 1.82–7.42)
NEUTROPHILS NFR BLD: 61.1 % (ref 44–72)
NRBC # BLD AUTO: 0 K/UL
NRBC BLD-RTO: 0 /100 WBC (ref 0–0.2)
OUTPT INFUS CBC COMMENT OICOM: ABNORMAL
PLATELET # BLD AUTO: 272 K/UL (ref 164–446)
PMV BLD AUTO: 8.8 FL (ref 9–12.9)
POTASSIUM SERPL-SCNC: 4.2 MMOL/L (ref 3.6–5.5)
PROT SERPL-MCNC: 6.7 G/DL (ref 6–8.2)
RBC # BLD AUTO: 5.05 M/UL (ref 4.7–6.1)
SODIUM SERPL-SCNC: 139 MMOL/L (ref 135–145)
T4 FREE SERPL-MCNC: 1.02 NG/DL (ref 0.93–1.7)
TSH SERPL-ACNC: 1.14 UIU/ML (ref 0.35–5.5)
WBC # BLD AUTO: 4.4 K/UL (ref 4.8–10.8)

## 2024-12-28 PROCEDURE — 700105 HCHG RX REV CODE 258: Performed by: INTERNAL MEDICINE

## 2024-12-28 PROCEDURE — 85025 COMPLETE CBC W/AUTO DIFF WBC: CPT

## 2024-12-28 PROCEDURE — 84443 ASSAY THYROID STIM HORMONE: CPT

## 2024-12-28 PROCEDURE — 84439 ASSAY OF FREE THYROXINE: CPT

## 2024-12-28 PROCEDURE — 80053 COMPREHEN METABOLIC PANEL: CPT

## 2024-12-28 PROCEDURE — 96413 CHEMO IV INFUSION 1 HR: CPT

## 2024-12-28 PROCEDURE — 700111 HCHG RX REV CODE 636 W/ 250 OVERRIDE (IP): Mod: JZ | Performed by: INTERNAL MEDICINE

## 2024-12-28 RX ADMIN — SODIUM CHLORIDE 200 MG: 9 INJECTION, SOLUTION INTRAVENOUS at 08:18

## 2024-12-28 ASSESSMENT — FIBROSIS 4 INDEX: FIB4 SCORE: 1

## 2024-12-28 NOTE — PROGRESS NOTES
"Pharmacy Chemotherapy Calculation:    Dx: Melanoma          Cycle 6 - 1 day early (approved by insurance)  Previous treatment: C5 on 12/8/24    Protocol: Pembrolizumab     *Dosing Reference*  Pembrolizumab 200 mg IV over 30 minutes on Day 1  21-day cycle for 12 months (adjuvant treatment)   NCCN Guidelines® for Melanoma: Cutaneous V.2.2024.   Shawna M, et al. Eur J Cancer. 2020;131:68-75.b   Huang RAMSEY, et al. N Engl J Med. 2015;372(26):2521-32.a   Mehrdad O, et al. Reba Oncol. 2019;30(4):582- 588.a   Marcus CASTRO et al. N Engl J Med. 2018;378(19):3211-1178.a   Kris FERNANDEZ, et al. Future Oncol. 2020;16(3):3856-8206.b    Allergies:  Bactrim [sulfamethoxazole-trimethoprim]     /81   Pulse 62   Temp 36.6 °C (97.9 °F) (Temporal)   Resp 16   Ht 1.64 m (5' 4.57\")   Wt 65.5 kg (144 lb 6.4 oz)   SpO2 95%   BMI 24.35 kg/m²  Body surface area is 1.73 meters squared.    All labs (12/28/24) and thyroid panel within treatment plan parameters.      Pembrolizumab 200 mg fixed dose = 200 mg   No calc required, OK to treat with final dose =  200 mg IV      Serenity Rob, PharmD  "

## 2024-12-28 NOTE — PROGRESS NOTES
Efrain presents to infusion for cycle 9 (first 3 doses received at [facility]) of pembrolizumab for melanoma. Patient reports feeling well today with no major complaints, has tolerated previous treatments well aside from 1 day of fatigue and rash in area of melanoma, MD aware.       PIV started with positive return and labs drawn off of IV start.    Labs reviewed by RN, within parameters for treatment today.     Pembrolizumab given over 30 minutes with 0.2 micron filter, patient tolerated well with no adverse effects.     PIV flushed post infusion with positive blood return, d/jean pierre with tip intact.    Patient left in stable condition, knows when to return for next appt.

## 2024-12-28 NOTE — PROGRESS NOTES
Chemotherapy Verification - PRIMARY RN      Height = 1.64m  Weight = 65.5 kg  BSA = 1.73m2       Medication: pembrolizumab  Dose: 200 mg set dose  Calculated Dose: 200 mg                            (In mg/m2, AUC, mg/kg)         I confirm this process was performed independently with the BSA and all final chemotherapy dosing calculations congruent.  Any discrepancies of 10% or greater have been addressed with the chemotherapy pharmacist. The resolution of the discrepancy has been documented in the EPIC progress notes.

## 2024-12-28 NOTE — PROGRESS NOTES
"Pharmacy Chemotherapy Calculation:    Dx: Melanoma        Cycle 6  Previous treatment: C5 12/8//24; started pembrolizumab 5/2024 @ [facility]    Protocol: Pembrolizumab     *Dosing Reference*  Pembrolizumab 200 mg IV over 30 minutes on Day 1  21-day cycle for 12 months (adjuvant treatment)   NCCN Guidelines® for Melanoma: Cutaneous V.2.2024.   Shawna M, et al. Eur J Cancer. 2020;131:68-75.b   Huang RAMSEY et al. N Engl J Med. 2015;372(26):2521-32.a   Mehrdad O, et al. Reba Oncol. 2019;30(4):582- 588.a   Marcus CASTRO et al. N Engl J Med. 2018;378(19):5772-2205.a   Kris FERNANDEZ, et al. Future Oncol. 2020;16(3):1846-2261.b    Allergies:  Bactrim [sulfamethoxazole-trimethoprim]     /81   Pulse 62   Temp 36.6 °C (97.9 °F) (Temporal)   Resp 16   Ht 1.64 m (5' 4.57\")   Wt 65.5 kg (144 lb 6.4 oz)   SpO2 95%   BMI 24.35 kg/m²  Body surface area is 1.73 meters squared.    Labs 12/28/24  ANC~ 2670 Plt = 272k   Hgb = 15.2     SCr = 0.87 mg/dL CrCl ~ 91.2 mL/min   LFT's = WNLs  TBili = 0.4   TSH = 1.14 Free T4 = 1.02    Pembrolizumab 200 mg fixed dose = 200 mg   No calc required, OK to treat with final dose =  200 mg IV    Artem Cantrell, PharmD  "

## 2024-12-28 NOTE — PROGRESS NOTES
Chemotherapy Verification - SECONDARY RN       Height = 1.64m  Weight = 65.5 kg  BSA = 1.73 m^2       Medication: pembrolizumab (Keytruda)  Dose: 200 mg (Set dose)  Calculated Dose: 200 mg                             (In mg/m2, AUC, mg/kg)     I confirm that this process was performed independently.

## 2025-01-15 RX ORDER — ONDANSETRON 8 MG/1
8 TABLET, ORALLY DISINTEGRATING ORAL PRN
Status: CANCELLED | OUTPATIENT
Start: 2025-01-18

## 2025-01-15 RX ORDER — 0.9 % SODIUM CHLORIDE 0.9 %
3 VIAL (ML) INJECTION PRN
Status: CANCELLED | OUTPATIENT
Start: 2025-01-18

## 2025-01-15 RX ORDER — DIPHENHYDRAMINE HYDROCHLORIDE 50 MG/ML
50 INJECTION INTRAMUSCULAR; INTRAVENOUS PRN
Status: CANCELLED | OUTPATIENT
Start: 2025-01-18

## 2025-01-15 RX ORDER — 0.9 % SODIUM CHLORIDE 0.9 %
10 VIAL (ML) INJECTION PRN
Status: CANCELLED | OUTPATIENT
Start: 2025-01-18

## 2025-01-15 RX ORDER — EPINEPHRINE 1 MG/ML(1)
0.5 AMPUL (ML) INJECTION PRN
Status: CANCELLED | OUTPATIENT
Start: 2025-01-18

## 2025-01-15 RX ORDER — 0.9 % SODIUM CHLORIDE 0.9 %
10 VIAL (ML) INJECTION PRN
Status: CANCELLED | OUTPATIENT
Start: 2025-01-17

## 2025-01-15 RX ORDER — METHYLPREDNISOLONE SODIUM SUCCINATE 125 MG/2ML
125 INJECTION, POWDER, LYOPHILIZED, FOR SOLUTION INTRAMUSCULAR; INTRAVENOUS PRN
Status: CANCELLED | OUTPATIENT
Start: 2025-01-18

## 2025-01-15 RX ORDER — 0.9 % SODIUM CHLORIDE 0.9 %
3 VIAL (ML) INJECTION PRN
Status: CANCELLED | OUTPATIENT
Start: 2025-01-17

## 2025-01-15 RX ORDER — PROCHLORPERAZINE MALEATE 10 MG
10 TABLET ORAL EVERY 6 HOURS PRN
Status: CANCELLED | OUTPATIENT
Start: 2025-01-18

## 2025-01-15 RX ORDER — 0.9 % SODIUM CHLORIDE 0.9 %
VIAL (ML) INJECTION PRN
Status: CANCELLED | OUTPATIENT
Start: 2025-01-17

## 2025-01-15 RX ORDER — SODIUM CHLORIDE 9 MG/ML
INJECTION, SOLUTION INTRAVENOUS CONTINUOUS
Status: CANCELLED | OUTPATIENT
Start: 2025-01-18

## 2025-01-15 RX ORDER — ONDANSETRON 2 MG/ML
4 INJECTION INTRAMUSCULAR; INTRAVENOUS PRN
Status: CANCELLED | OUTPATIENT
Start: 2025-01-18

## 2025-01-15 RX ORDER — 0.9 % SODIUM CHLORIDE 0.9 %
VIAL (ML) INJECTION PRN
Status: CANCELLED | OUTPATIENT
Start: 2025-01-18

## 2025-01-18 ENCOUNTER — OUTPATIENT INFUSION SERVICES (OUTPATIENT)
Dept: ONCOLOGY | Facility: MEDICAL CENTER | Age: 55
End: 2025-01-18
Attending: INTERNAL MEDICINE
Payer: COMMERCIAL

## 2025-01-18 VITALS
HEART RATE: 61 BPM | TEMPERATURE: 97.4 F | OXYGEN SATURATION: 97 % | RESPIRATION RATE: 16 BRPM | DIASTOLIC BLOOD PRESSURE: 77 MMHG | SYSTOLIC BLOOD PRESSURE: 135 MMHG | HEIGHT: 65 IN | BODY MASS INDEX: 24.13 KG/M2 | WEIGHT: 144.84 LBS

## 2025-01-18 DIAGNOSIS — C43.8 MALIGNANT MELANOMA OF OVERLAPPING SITES (HCC): ICD-10-CM

## 2025-01-18 LAB
ALBUMIN SERPL BCP-MCNC: 4.4 G/DL (ref 3.2–4.9)
ALBUMIN/GLOB SERPL: 1.7 G/DL
ALP SERPL-CCNC: 76 U/L (ref 30–99)
ALT SERPL-CCNC: 14 U/L (ref 2–50)
ANION GAP SERPL CALC-SCNC: 11 MMOL/L (ref 7–16)
AST SERPL-CCNC: 19 U/L (ref 12–45)
BASOPHILS # BLD AUTO: 0.6 % (ref 0–1.8)
BASOPHILS # BLD: 0.03 K/UL (ref 0–0.12)
BILIRUB SERPL-MCNC: 0.6 MG/DL (ref 0.1–1.5)
BUN SERPL-MCNC: 14 MG/DL (ref 8–22)
CALCIUM ALBUM COR SERPL-MCNC: 8.8 MG/DL (ref 8.5–10.5)
CALCIUM SERPL-MCNC: 9.1 MG/DL (ref 8.5–10.5)
CHLORIDE SERPL-SCNC: 102 MMOL/L (ref 96–112)
CO2 SERPL-SCNC: 23 MMOL/L (ref 20–33)
CREAT SERPL-MCNC: 0.93 MG/DL (ref 0.5–1.4)
EOSINOPHIL # BLD AUTO: 0.11 K/UL (ref 0–0.51)
EOSINOPHIL NFR BLD: 2.1 % (ref 0–6.9)
ERYTHROCYTE [DISTWIDTH] IN BLOOD BY AUTOMATED COUNT: 42.2 FL (ref 35.9–50)
GFR SERPLBLD CREATININE-BSD FMLA CKD-EPI: 98 ML/MIN/1.73 M 2
GLOBULIN SER CALC-MCNC: 2.6 G/DL (ref 1.9–3.5)
GLUCOSE SERPL-MCNC: 99 MG/DL (ref 65–99)
HCT VFR BLD AUTO: 46.9 % (ref 42–52)
HGB BLD-MCNC: 16 G/DL (ref 14–18)
IMM GRANULOCYTES # BLD AUTO: 0.03 K/UL (ref 0–0.11)
IMM GRANULOCYTES NFR BLD AUTO: 0.6 % (ref 0–0.9)
LYMPHOCYTES # BLD AUTO: 1.16 K/UL (ref 1–4.8)
LYMPHOCYTES NFR BLD: 22.5 % (ref 22–41)
MCH RBC QN AUTO: 31.3 PG (ref 27–33)
MCHC RBC AUTO-ENTMCNC: 34.1 G/DL (ref 32.3–36.5)
MCV RBC AUTO: 91.6 FL (ref 81.4–97.8)
MONOCYTES # BLD AUTO: 0.49 K/UL (ref 0–0.85)
MONOCYTES NFR BLD AUTO: 9.5 % (ref 0–13.4)
NEUTROPHILS # BLD AUTO: 3.33 K/UL (ref 1.82–7.42)
NEUTROPHILS NFR BLD: 64.7 % (ref 44–72)
NRBC # BLD AUTO: 0 K/UL
NRBC BLD-RTO: 0 /100 WBC (ref 0–0.2)
OUTPT INFUS CBC COMMENT OICOM: ABNORMAL
PLATELET # BLD AUTO: 262 K/UL (ref 164–446)
PMV BLD AUTO: 8.9 FL (ref 9–12.9)
POTASSIUM SERPL-SCNC: 3.9 MMOL/L (ref 3.6–5.5)
PROT SERPL-MCNC: 7 G/DL (ref 6–8.2)
RBC # BLD AUTO: 5.12 M/UL (ref 4.7–6.1)
SODIUM SERPL-SCNC: 136 MMOL/L (ref 135–145)
T4 FREE SERPL-MCNC: 1.09 NG/DL (ref 0.93–1.7)
TSH SERPL-ACNC: 1.06 UIU/ML (ref 0.35–5.5)
WBC # BLD AUTO: 5.2 K/UL (ref 4.8–10.8)

## 2025-01-18 PROCEDURE — 96413 CHEMO IV INFUSION 1 HR: CPT

## 2025-01-18 PROCEDURE — 84443 ASSAY THYROID STIM HORMONE: CPT

## 2025-01-18 PROCEDURE — 700111 HCHG RX REV CODE 636 W/ 250 OVERRIDE (IP): Mod: JZ | Performed by: INTERNAL MEDICINE

## 2025-01-18 PROCEDURE — 80053 COMPREHEN METABOLIC PANEL: CPT

## 2025-01-18 PROCEDURE — 84439 ASSAY OF FREE THYROXINE: CPT

## 2025-01-18 PROCEDURE — 700105 HCHG RX REV CODE 258: Performed by: INTERNAL MEDICINE

## 2025-01-18 PROCEDURE — 85025 COMPLETE CBC W/AUTO DIFF WBC: CPT

## 2025-01-18 RX ADMIN — SODIUM CHLORIDE 200 MG: 9 INJECTION, SOLUTION INTRAVENOUS at 08:44

## 2025-01-18 ASSESSMENT — FIBROSIS 4 INDEX: FIB4 SCORE: .7647058823529411765

## 2025-01-18 NOTE — PROGRESS NOTES
Pt arrived ambulatory to [facility] for Q 3 week pembrolizumab (Keytruda) infusion for melanoma. POC discussed with pt and he agrees with plan.     PIV established, brisk blood return noted, labs drawn as ordered. Results reviewed, ok to proceed with treatment.     Keytruda infused over 30 minutes. Pt tolerated treatment without s/s adverse reaction, no c/o back spasms.     PIV dc'd catheter tip intact, gauze and coban dressing applied.     Pt discharged to [facility], [facility]. Pt's next appointment confirmed 2/8/2025.

## 2025-01-18 NOTE — PROGRESS NOTES
Chemotherapy Verification - PRIMARY RN      Height = 165cm  Weight = 65.7kg  BSA = 1.74m^2       Medication: pembrolizumab (Keytruda) Dose: fixed dose 200 mg  Calculated Dose: 200 mg                                  I confirm this process was performed independently with the BSA and all final chemotherapy dosing calculations congruent.  Any discrepancies of 10% or greater have been addressed with the chemotherapy pharmacist. The resolution of the discrepancy has been documented in the EPIC progress notes.

## 2025-01-18 NOTE — PROGRESS NOTES
"Pharmacy Chemotherapy Calculation:    Dx: Melenoma     Protocol: Pembrolizumab     *Dosing Reference*  Pembrolizumab 200 mg IV over 30 minutes on Day 1  21-day cycle for 12 months (adjuvant treatment)   NCCNGuidelines® for Melanoma: Cutaneous V.2.2024.   Shawna SINCLAIR et al. Eur J Cancer. 2020;131:68-75.b   Huang RAMSEY et al. N Engl J Med. 2015;372(26):2521-32.a   Mehrdad O , et al. Reba Oncol. 2019;30(4):582- 588.a   Marcus CASTRO et al. N Engl J Med. 2018;378(19):5262-4158.a   Kris ALEJANDRAJ , et al. Future Oncol. 2020;16(3):7291-1584.b    Allergies:  Bactrim [sulfamethoxazole-trimethoprim]     /77   Pulse 61   Temp 36.3 °C (97.4 °F) (Temporal)   Resp 16   Ht 1.65 m (5' 4.96\")   Wt 65.7 kg (144 lb 13.5 oz)   SpO2 97%   BMI 24.13 kg/m²  Body surface area is 1.74 meters squared.    All labs (1/18/25) and thyroid panel within treatment plan parameters.      Drug Order   (Drug name, dose, route, IV Fluid & volume, frequency, number of doses) Cycle 7  Previous treatment: C6 on 12/28/24   Medication = Pembrolizumab   Base Dose = 200 mg   Fixed dose, no calculation needed   Final Dose = 200 mg  Route = IV  Fluid & Volume = NS 50 mL  Admin Duration = Over 30 min           Fixed dose okay to treat with final dose   By my signature below, I confirm this process was performed independently with the BSA and all final chemotherapy dosing calculations congruent. I have reviewed the above chemotherapy order and that my calculation of the final dose and BSA (when applicable) corroborate those calculations of the  pharmacist. Discrepancies of 10% or greater in the written dose have been addressed and documented within the [facility] Progress notes.    Serenity Rob, PharmD  "

## 2025-01-18 NOTE — PROGRESS NOTES
Chemotherapy Verification - SECONDARY RN       Height = 165 cm  Weight = 65.7 kg  BSA = 1.74 m2       Medication: Keytruda  Dose: 200 mg (set dose)  Calculated Dose: 200 mg                                  I confirm that this process was performed independently.

## 2025-01-18 NOTE — PROGRESS NOTES
"Pharmacy Chemotherapy Calculation:    Dx: Melanoma        Cycle 7  Previous treatment: C6 12/28/24; started pembrolizumab 5/2024 @ [facility]    Protocol: Pembrolizumab     *Dosing Reference*  Pembrolizumab 200 mg IV over 30 minutes on Day 1  21-day cycle for 12 months (adjuvant treatment)   NCCN Guidelines® for Melanoma: Cutaneous V.2.2024.   Shawna M, et al. Eur J Cancer. 2020;131:68-75.b   Huang RAMSEY, et al. N Engl J Med. 2015;372(26):2521-32.a   Mehrdad O, et al. Reba Oncol. 2019;30(4):582- 588.a   Marcus CASTRO, et al. N Engl J Med. 2018;378(19):1930-5661.a   Kris FERNANDEZ, et al. Future Oncol. 2020;16(3):7914-2253.b    Allergies:  Bactrim [sulfamethoxazole-trimethoprim]     /77   Pulse 61   Temp 36.3 °C (97.4 °F) (Temporal)   Resp 16   Ht 1.65 m (5' 4.96\")   Wt 65.7 kg (144 lb 13.5 oz)   SpO2 97%   BMI 24.13 kg/m²  Body surface area is 1.74 meters squared.    Labs 1/18/25  ANC~ 3330 Plt = 262k   Hgb = 16     SCr = 0.93 mg/dL CrCl ~ 85.5 mL/min   LFT's = WNLs  TBili = 0.6   TSH = 1.06 Free T4 = 1.09    Pembrolizumab 200 mg fixed dose = 200 mg   No calc required, OK to treat with final dose =  200 mg IV    Artem Cantrell, PharmD  "

## 2025-02-05 RX ORDER — 0.9 % SODIUM CHLORIDE 0.9 %
10 VIAL (ML) INJECTION PRN
Status: CANCELLED | OUTPATIENT
Start: 2025-02-07

## 2025-02-05 RX ORDER — 0.9 % SODIUM CHLORIDE 0.9 %
VIAL (ML) INJECTION PRN
Status: CANCELLED | OUTPATIENT
Start: 2025-02-07

## 2025-02-05 RX ORDER — SODIUM CHLORIDE 9 MG/ML
INJECTION, SOLUTION INTRAVENOUS CONTINUOUS
Status: CANCELLED | OUTPATIENT
Start: 2025-02-08

## 2025-02-05 RX ORDER — 0.9 % SODIUM CHLORIDE 0.9 %
10 VIAL (ML) INJECTION PRN
Status: CANCELLED | OUTPATIENT
Start: 2025-02-08

## 2025-02-05 RX ORDER — PROCHLORPERAZINE MALEATE 10 MG
10 TABLET ORAL EVERY 6 HOURS PRN
Status: CANCELLED | OUTPATIENT
Start: 2025-02-08

## 2025-02-05 RX ORDER — DIPHENHYDRAMINE HYDROCHLORIDE 50 MG/ML
50 INJECTION INTRAMUSCULAR; INTRAVENOUS PRN
Status: CANCELLED | OUTPATIENT
Start: 2025-02-08

## 2025-02-05 RX ORDER — EPINEPHRINE 1 MG/ML(1)
0.5 AMPUL (ML) INJECTION PRN
Status: CANCELLED | OUTPATIENT
Start: 2025-02-08

## 2025-02-05 RX ORDER — ONDANSETRON 8 MG/1
8 TABLET, ORALLY DISINTEGRATING ORAL PRN
Status: CANCELLED | OUTPATIENT
Start: 2025-02-08

## 2025-02-05 RX ORDER — ONDANSETRON 2 MG/ML
4 INJECTION INTRAMUSCULAR; INTRAVENOUS PRN
Status: CANCELLED | OUTPATIENT
Start: 2025-02-08

## 2025-02-05 RX ORDER — 0.9 % SODIUM CHLORIDE 0.9 %
VIAL (ML) INJECTION PRN
Status: CANCELLED | OUTPATIENT
Start: 2025-02-08

## 2025-02-05 RX ORDER — METHYLPREDNISOLONE SODIUM SUCCINATE 125 MG/2ML
125 INJECTION, POWDER, LYOPHILIZED, FOR SOLUTION INTRAMUSCULAR; INTRAVENOUS PRN
Status: CANCELLED | OUTPATIENT
Start: 2025-02-08

## 2025-02-05 RX ORDER — 0.9 % SODIUM CHLORIDE 0.9 %
3 VIAL (ML) INJECTION PRN
Status: CANCELLED | OUTPATIENT
Start: 2025-02-08

## 2025-02-05 RX ORDER — 0.9 % SODIUM CHLORIDE 0.9 %
3 VIAL (ML) INJECTION PRN
Status: CANCELLED | OUTPATIENT
Start: 2025-02-07

## 2025-02-08 ENCOUNTER — OUTPATIENT INFUSION SERVICES (OUTPATIENT)
Dept: ONCOLOGY | Facility: MEDICAL CENTER | Age: 55
End: 2025-02-08
Attending: INTERNAL MEDICINE
Payer: COMMERCIAL

## 2025-02-08 VITALS
OXYGEN SATURATION: 96 % | SYSTOLIC BLOOD PRESSURE: 118 MMHG | BODY MASS INDEX: 23.28 KG/M2 | HEIGHT: 66 IN | TEMPERATURE: 98 F | DIASTOLIC BLOOD PRESSURE: 81 MMHG | HEART RATE: 68 BPM | WEIGHT: 144.84 LBS | RESPIRATION RATE: 16 BRPM

## 2025-02-08 DIAGNOSIS — C43.8 MALIGNANT MELANOMA OF OVERLAPPING SITES (HCC): ICD-10-CM

## 2025-02-08 LAB
ALBUMIN SERPL BCP-MCNC: 4.3 G/DL (ref 3.2–4.9)
ALBUMIN/GLOB SERPL: 1.6 G/DL
ALP SERPL-CCNC: 77 U/L (ref 30–99)
ALT SERPL-CCNC: 16 U/L (ref 2–50)
ANION GAP SERPL CALC-SCNC: 11 MMOL/L (ref 7–16)
AST SERPL-CCNC: 22 U/L (ref 12–45)
BASOPHILS # BLD AUTO: 0.4 % (ref 0–1.8)
BASOPHILS # BLD: 0.02 K/UL (ref 0–0.12)
BILIRUB SERPL-MCNC: 0.5 MG/DL (ref 0.1–1.5)
BUN SERPL-MCNC: 10 MG/DL (ref 8–22)
CALCIUM ALBUM COR SERPL-MCNC: 8.8 MG/DL (ref 8.5–10.5)
CALCIUM SERPL-MCNC: 9 MG/DL (ref 8.5–10.5)
CHLORIDE SERPL-SCNC: 101 MMOL/L (ref 96–112)
CO2 SERPL-SCNC: 22 MMOL/L (ref 20–33)
CREAT SERPL-MCNC: 1.13 MG/DL (ref 0.5–1.4)
EOSINOPHIL # BLD AUTO: 0.08 K/UL (ref 0–0.51)
EOSINOPHIL NFR BLD: 1.6 % (ref 0–6.9)
ERYTHROCYTE [DISTWIDTH] IN BLOOD BY AUTOMATED COUNT: 42.4 FL (ref 35.9–50)
GFR SERPLBLD CREATININE-BSD FMLA CKD-EPI: 78 ML/MIN/1.73 M 2
GLOBULIN SER CALC-MCNC: 2.7 G/DL (ref 1.9–3.5)
GLUCOSE SERPL-MCNC: 125 MG/DL (ref 65–99)
HCT VFR BLD AUTO: 46 % (ref 42–52)
HGB BLD-MCNC: 15.8 G/DL (ref 14–18)
IMM GRANULOCYTES # BLD AUTO: 0.02 K/UL (ref 0–0.11)
IMM GRANULOCYTES NFR BLD AUTO: 0.4 % (ref 0–0.9)
LYMPHOCYTES # BLD AUTO: 0.91 K/UL (ref 1–4.8)
LYMPHOCYTES NFR BLD: 17.8 % (ref 22–41)
MCH RBC QN AUTO: 30.6 PG (ref 27–33)
MCHC RBC AUTO-ENTMCNC: 34.3 G/DL (ref 32.3–36.5)
MCV RBC AUTO: 89 FL (ref 81.4–97.8)
MONOCYTES # BLD AUTO: 0.88 K/UL (ref 0–0.85)
MONOCYTES NFR BLD AUTO: 17.2 % (ref 0–13.4)
NEUTROPHILS # BLD AUTO: 3.2 K/UL (ref 1.82–7.42)
NEUTROPHILS NFR BLD: 62.6 % (ref 44–72)
NRBC # BLD AUTO: 0 K/UL
NRBC BLD-RTO: 0 /100 WBC (ref 0–0.2)
OUTPT INFUS CBC COMMENT OICOM: ABNORMAL
PLATELET # BLD AUTO: 225 K/UL (ref 164–446)
PMV BLD AUTO: 8.7 FL (ref 9–12.9)
POTASSIUM SERPL-SCNC: 4 MMOL/L (ref 3.6–5.5)
PROT SERPL-MCNC: 7 G/DL (ref 6–8.2)
RBC # BLD AUTO: 5.17 M/UL (ref 4.7–6.1)
SODIUM SERPL-SCNC: 134 MMOL/L (ref 135–145)
T4 FREE SERPL-MCNC: 1.03 NG/DL (ref 0.93–1.7)
TSH SERPL-ACNC: 1.46 UIU/ML (ref 0.35–5.5)
WBC # BLD AUTO: 5.1 K/UL (ref 4.8–10.8)

## 2025-02-08 PROCEDURE — 84443 ASSAY THYROID STIM HORMONE: CPT

## 2025-02-08 PROCEDURE — 85025 COMPLETE CBC W/AUTO DIFF WBC: CPT

## 2025-02-08 PROCEDURE — 84439 ASSAY OF FREE THYROXINE: CPT

## 2025-02-08 PROCEDURE — 80053 COMPREHEN METABOLIC PANEL: CPT

## 2025-02-08 PROCEDURE — 96413 CHEMO IV INFUSION 1 HR: CPT

## 2025-02-08 PROCEDURE — 700111 HCHG RX REV CODE 636 W/ 250 OVERRIDE (IP): Mod: JZ | Performed by: INTERNAL MEDICINE

## 2025-02-08 PROCEDURE — 700105 HCHG RX REV CODE 258: Performed by: INTERNAL MEDICINE

## 2025-02-08 RX ADMIN — SODIUM CHLORIDE 200 MG: 9 INJECTION, SOLUTION INTRAVENOUS at 08:17

## 2025-02-08 ASSESSMENT — FIBROSIS 4 INDEX: FIB4 SCORE: 1.01

## 2025-02-08 NOTE — PROGRESS NOTES
Chemotherapy Verification - PRIMARY RN      Height = 1.68m  Weight = 65.7kg  BSA = 1.75m2       Medication: pembrolizumab (Keytruda)  Dose: 200mg set dose  Calculated Dose: 200mg set dose                             (In mg/m2, AUC, mg/kg)           I confirm this process was performed independently with the BSA and all final chemotherapy dosing calculations congruent.  Any discrepancies of 10% or greater have been addressed with the chemotherapy pharmacist. The resolution of the discrepancy has been documented in the EPIC progress notes.

## 2025-02-08 NOTE — PROGRESS NOTES
Patient came into infusion independently. Orders and vitals reviewed, assessment done. PIV established with blood return noted. Labs collected and reviewed, patient meets parameters to proceed with treatment today. Cycle 8 of keytruda treatment given as ordered through 0.2 micron filter with no adverse events. PIV flushed and removed with tip intact. Patient left in stable condition with next appointment confirmed.

## 2025-02-08 NOTE — PROGRESS NOTES
"Pharmacy Chemotherapy Calculation:    Dx: Melenoma     Protocol: Pembrolizumab     *Dosing Reference*  Pembrolizumab 200 mg IV over 30 minutes on Day 1  21-day cycle for 12 months (adjuvant treatment)   NCCNGuidelines® for Melanoma: Cutaneous V.2.2024.   Shawna SINCLAIR et al. Eur J Cancer. 2020;131:68-75.b   Huang RAMSEY et al. N Engl J Med. 2015;372(26):2521-32.a   Mehrdad O  et al. Reba Oncol. 2019;30(4):582- 588.a   Marcus CASTRO et al. N Engl J Med. 2018;378(19):4512-3642.a   Kris FERNANDEZ , et al. Future Oncol. 2020;16(3):7636-7186.b    Allergies:  Bactrim [sulfamethoxazole-trimethoprim]     /81   Pulse 68   Temp 36.7 °C (98 °F) (Temporal)   Resp 16   Ht 1.68 m (5' 6.14\")   Wt 65.7 kg (144 lb 13.5 oz)   SpO2 96%   BMI 23.28 kg/m²  Body surface area is 1.75 meters squared.    Labs 2/8/25  ANC~ 3200 Plt = 225k   Hgb = 15.8     SCr = 1.13 mg/dL CrCl ~ 70.3 mL/min   LFT's = WNLs  TBili = 0.5   TSH = 1.46 Free T4 = 1.03    Drug Order   (Drug name, dose, route, IV Fluid & volume, frequency, number of doses) Cycle 8  Previous treatment: C7 on 1/18/25   Medication = Pembrolizumab   Base Dose = 200 mg   Fixed dose, no calculation needed   Final Dose = 200 mg  Route = IV  Fluid & Volume = NS 50 mL  Admin Duration = Over 30 min           Fixed dose okay to treat with final dose   By my signature below, I confirm this process was performed independently with the BSA and all final chemotherapy dosing calculations congruent. I have reviewed the above chemotherapy order and that my calculation of the final dose and BSA (when applicable) corroborate those calculations of the  pharmacist. Discrepancies of 10% or greater in the written dose have been addressed and documented within the [facility] Progress notes.    Artem Cantrell, PharmD  "

## 2025-02-08 NOTE — PROGRESS NOTES
Chemotherapy Verification - SECONDARY RN       Height = 1.68 m  Weight = 65.7 kg  BSA = 1.75 m2       Medication: pembrolizumab (Keytruda)  Dose: 200 mg set dose  Calculated Dose: 200 mg                             (In mg/m2, AUC, mg/kg)     I confirm that this process was performed independently.

## 2025-02-08 NOTE — PROGRESS NOTES
"Pharmacy Chemotherapy Calculation:    Dx: Melanoma          Cycle 8  Previous treatment: C7 on 1/18/25    Protocol: Pembrolizumab     *Dosing Reference*  Pembrolizumab 200 mg IV over 30 minutes on Day 1  21-day cycle for 12 months (adjuvant treatment)   NCCN Guidelines® for Melanoma: Cutaneous V.2.2024.   Shawna M et al. Eur J Cancer. 2020;131:68-75.b   Huang RAMSEY, et al. N Engl J Med. 2015;372(26):2521-32.a   Mehrdad O, et al. Reba Oncol. 2019;30(4):582- 588.a   Marcus CASTRO et al. N Engl J Med. 2018;378(19):7921-2540.a   Kris ALEJANDRAJ, et al. Future Oncol. 2020;16(3):4443-4903.b    Allergies:  Bactrim [sulfamethoxazole-trimethoprim]     /81   Pulse 68   Temp 36.7 °C (98 °F) (Temporal)   Resp 16   Ht 1.68 m (5' 6.14\")   Wt 65.7 kg (144 lb 13.5 oz)   SpO2 96%   BMI 23.28 kg/m²  Body surface area is 1.75 meters squared.    All labs (2/8/25) and thyroid panel within treatment plan parameters.      Pembrolizumab 200 mg fixed dose   No calc required, OK to treat with final dose =  200 mg IV      Serenity Rob, PharmD  "

## 2025-02-25 ENCOUNTER — OFFICE VISIT (OUTPATIENT)
Dept: URGENT CARE | Facility: PHYSICIAN GROUP | Age: 55
End: 2025-02-25
Payer: COMMERCIAL

## 2025-02-25 VITALS
DIASTOLIC BLOOD PRESSURE: 72 MMHG | OXYGEN SATURATION: 97 % | RESPIRATION RATE: 18 BRPM | BODY MASS INDEX: 21.97 KG/M2 | WEIGHT: 136.69 LBS | SYSTOLIC BLOOD PRESSURE: 136 MMHG | HEART RATE: 91 BPM | TEMPERATURE: 99.3 F | HEIGHT: 66 IN

## 2025-02-25 DIAGNOSIS — B96.89 ACUTE BACTERIAL SINUSITIS: ICD-10-CM

## 2025-02-25 DIAGNOSIS — J01.90 ACUTE BACTERIAL SINUSITIS: ICD-10-CM

## 2025-02-25 PROCEDURE — 99214 OFFICE O/P EST MOD 30 MIN: CPT | Performed by: PHYSICIAN ASSISTANT

## 2025-02-25 PROCEDURE — 3078F DIAST BP <80 MM HG: CPT | Performed by: PHYSICIAN ASSISTANT

## 2025-02-25 PROCEDURE — 3075F SYST BP GE 130 - 139MM HG: CPT | Performed by: PHYSICIAN ASSISTANT

## 2025-02-25 ASSESSMENT — ENCOUNTER SYMPTOMS
MYALGIAS: 1
SINUS PAIN: 1
COUGH: 1
CHILLS: 0
FEVER: 0

## 2025-02-25 ASSESSMENT — FIBROSIS 4 INDEX: FIB4 SCORE: 1.27

## 2025-02-25 NOTE — PROGRESS NOTES
"  Subjective:   Yonis Ward is a 54 y.o. male who presents today with   Chief Complaint   Patient presents with    Sinus Problem     Sinus pressure x 6 days, ears clogged, thick mucus, wet cough. Negative for chest congestion/pressure.       Sinus Problem  This is a new problem. The current episode started in the past 7 days. The problem has been gradually worsening since onset. There has been no fever. Associated symptoms include congestion and coughing. Pertinent negatives include no chills. Treatments tried: otc sinus. The treatment provided mild relief.       PMH:  has no past medical history on file.  MEDS:   Current Outpatient Medications:     amoxicillin-clavulanate (AUGMENTIN) 875-125 MG Tab, Take 1 Tablet by mouth 2 times a day for 7 days., Disp: 14 Tablet, Rfl: 0    acetaminophen (TYLENOL) 500 MG Tab, Take 2 Tablets by mouth every 6 hours as needed for Moderate Pain., Disp: , Rfl:   ALLERGIES:   Allergies   Allergen Reactions    Bactrim [Sulfamethoxazole-Trimethoprim] Rash and Swelling     Rash with itching, skin peeling, eye and lip swelling     SURGHX: No past surgical history on file.  SOCHX:  reports that he quit smoking about 6 years ago. His smoking use included cigarettes. He has never used smokeless tobacco. He reports that he does not currently use alcohol. He reports that he does not use drugs.  FH: Reviewed with patient, not pertinent to this visit.       Review of Systems   Constitutional:  Negative for chills and fever.   HENT:  Positive for congestion and sinus pain.    Respiratory:  Positive for cough.    Musculoskeletal:  Positive for myalgias.        Objective:   /72 (BP Location: Left arm, Patient Position: Sitting, BP Cuff Size: Adult long)   Pulse 91   Temp 37.4 °C (99.3 °F) (Temporal)   Resp 18   Ht 1.676 m (5' 6\")   Wt 62 kg (136 lb 11 oz)   SpO2 97%   BMI 22.06 kg/m²   Physical Exam  Vitals and nursing note reviewed.   Constitutional:       General: He is not " in acute distress.     Appearance: Normal appearance. He is well-developed. He is not ill-appearing or toxic-appearing.   HENT:      Head: Normocephalic and atraumatic.      Right Ear: Hearing normal.      Left Ear: Hearing normal.      Nose: Mucosal edema and congestion present.      Right Sinus: Maxillary sinus tenderness and frontal sinus tenderness present.      Left Sinus: Maxillary sinus tenderness and frontal sinus tenderness present.   Cardiovascular:      Rate and Rhythm: Normal rate and regular rhythm.      Heart sounds: Normal heart sounds.   Pulmonary:      Effort: Pulmonary effort is normal.   Musculoskeletal:      Comments: Normal movement in all 4 extremities   Skin:     General: Skin is warm and dry.   Neurological:      Mental Status: He is alert.      Coordination: Coordination normal.   Psychiatric:         Mood and Affect: Mood normal.           Assessment/Plan:   Assessment    1. Acute bacterial sinusitis  - amoxicillin-clavulanate (AUGMENTIN) 875-125 MG Tab; Take 1 Tablet by mouth 2 times a day for 7 days.  Dispense: 14 Tablet; Refill: 0    Symptoms and presentation appear consistent with sinus infection we will treat accordingly with antibiotics given duration of symptoms.  Would also recommend use of over-the-counter sinus rinse such as Dovray pot per  instructions.    Differential diagnosis, natural history, supportive care, and indications for immediate follow-up discussed.   Patient given instructions and understanding of medications and treatment.    If not improving in 3-5 days, F/U with PCP or return to  if symptoms worsen.    Patient agreeable to plan.    Please note that this dictation was created using voice recognition software. I have made every reasonable attempt to correct obvious errors, but I expect that there are errors of grammar and possibly content that I did not discover before finalizing the note.    Aurelio Anthony PA-C

## 2025-03-01 ENCOUNTER — OUTPATIENT INFUSION SERVICES (OUTPATIENT)
Dept: ONCOLOGY | Facility: MEDICAL CENTER | Age: 55
End: 2025-03-01
Attending: INTERNAL MEDICINE
Payer: COMMERCIAL

## 2025-03-01 VITALS
WEIGHT: 141.98 LBS | TEMPERATURE: 97.4 F | BODY MASS INDEX: 22.82 KG/M2 | RESPIRATION RATE: 17 BRPM | SYSTOLIC BLOOD PRESSURE: 140 MMHG | HEART RATE: 61 BPM | HEIGHT: 66 IN | DIASTOLIC BLOOD PRESSURE: 89 MMHG | OXYGEN SATURATION: 98 %

## 2025-03-01 ASSESSMENT — FIBROSIS 4 INDEX: FIB4 SCORE: 1.27

## 2025-03-01 NOTE — PROGRESS NOTES
Pt arrived to [facility], ambulatory, for Keytruda. Upon assessment and review of recent notes, it was discovered that patient is currently on day 4 of a 7 day course of abx for respiratory illness. MD notified, orders received to defer treatment by 1 week. Pt updated on plan of care. Pt left [facility] with no s/sx of distress. Follow up appointment confirmed.

## 2025-03-06 RX ORDER — 0.9 % SODIUM CHLORIDE 0.9 %
3 VIAL (ML) INJECTION PRN
Status: CANCELLED | OUTPATIENT
Start: 2025-03-07

## 2025-03-06 RX ORDER — 0.9 % SODIUM CHLORIDE 0.9 %
VIAL (ML) INJECTION PRN
Status: CANCELLED | OUTPATIENT
Start: 2025-03-07

## 2025-03-06 RX ORDER — 0.9 % SODIUM CHLORIDE 0.9 %
10 VIAL (ML) INJECTION PRN
Status: CANCELLED | OUTPATIENT
Start: 2025-03-08

## 2025-03-06 RX ORDER — ONDANSETRON 8 MG/1
8 TABLET, ORALLY DISINTEGRATING ORAL PRN
Status: CANCELLED | OUTPATIENT
Start: 2025-03-08

## 2025-03-06 RX ORDER — METHYLPREDNISOLONE SODIUM SUCCINATE 125 MG/2ML
125 INJECTION, POWDER, LYOPHILIZED, FOR SOLUTION INTRAMUSCULAR; INTRAVENOUS PRN
Status: CANCELLED | OUTPATIENT
Start: 2025-03-08

## 2025-03-06 RX ORDER — 0.9 % SODIUM CHLORIDE 0.9 %
10 VIAL (ML) INJECTION PRN
Status: CANCELLED | OUTPATIENT
Start: 2025-03-07

## 2025-03-06 RX ORDER — ONDANSETRON 2 MG/ML
4 INJECTION INTRAMUSCULAR; INTRAVENOUS PRN
Status: CANCELLED | OUTPATIENT
Start: 2025-03-08

## 2025-03-06 RX ORDER — PROCHLORPERAZINE MALEATE 10 MG
10 TABLET ORAL EVERY 6 HOURS PRN
Status: CANCELLED | OUTPATIENT
Start: 2025-03-08

## 2025-03-06 RX ORDER — SODIUM CHLORIDE 9 MG/ML
INJECTION, SOLUTION INTRAVENOUS CONTINUOUS
Status: CANCELLED | OUTPATIENT
Start: 2025-03-08

## 2025-03-06 RX ORDER — EPINEPHRINE 1 MG/ML(1)
0.5 AMPUL (ML) INJECTION PRN
Status: CANCELLED | OUTPATIENT
Start: 2025-03-08

## 2025-03-06 RX ORDER — DIPHENHYDRAMINE HYDROCHLORIDE 50 MG/ML
50 INJECTION, SOLUTION INTRAMUSCULAR; INTRAVENOUS PRN
Status: CANCELLED | OUTPATIENT
Start: 2025-03-08

## 2025-03-06 RX ORDER — 0.9 % SODIUM CHLORIDE 0.9 %
VIAL (ML) INJECTION PRN
Status: CANCELLED | OUTPATIENT
Start: 2025-03-08

## 2025-03-06 RX ORDER — 0.9 % SODIUM CHLORIDE 0.9 %
3 VIAL (ML) INJECTION PRN
Status: CANCELLED | OUTPATIENT
Start: 2025-03-08

## 2025-03-07 NOTE — PROGRESS NOTES
"Pharmacy Chemotherapy Calculation:    Dx: Melenoma     Protocol: Pembrolizumab     *Dosing Reference*  Pembrolizumab 200 mg IV over 30 minutes on Day 1  21-day cycle for 12 months (adjuvant treatment)   NCCNGuidelines® for Melanoma: Cutaneous V.2.2024.   Shawna SINCLAIR et al. Eur J Cancer. 2020;131:68-75.b   Huang RAMSEY et al. N Engl J Med. 2015;372(26):2521-32.a   Mehrdad O , et al. Reba Oncol. 2019;30(4):582- 588.a   Marcus CASTRO et al. N Engl J Med. 2018;378(19):8880-5245.a   Kris ALEJANDRAJ , et al. Future Oncol. 2020;16(3):6377-9691.b    Allergies:  Bactrim [sulfamethoxazole-trimethoprim]     /81   Pulse 75   Temp 36.5 °C (97.7 °F) (Temporal)   Resp 17   Ht 1.68 m (5' 6.14\")   Wt 63.9 kg (140 lb 14 oz)   SpO2 96%   BMI 22.64 kg/m²  Body surface area is 1.73 meters squared.    All labs (3/8/25) and thyroid panel within treatment plan parameters.      Drug Order   (Drug name, dose, route, IV Fluid & volume, frequency, number of doses) Cycle 9  Previous treatment: C8 on 2/8/25   Medication = Pembrolizumab   Base Dose = 200 mg   Fixed dose, no calculation needed   Final Dose = 200 mg  Route = IV  Fluid & Volume = NS 50 mL  Admin Duration = Over 30 min           Fixed dose okay to treat with final dose   By my signature below, I confirm this process was performed independently with the BSA and all final chemotherapy dosing calculations congruent. I have reviewed the above chemotherapy order and that my calculation of the final dose and BSA (when applicable) corroborate those calculations of the  pharmacist. Discrepancies of 10% or greater in the written dose have been addressed and documented within the [facility] Progress notes.    Serenity Rob, PharmD  "

## 2025-03-08 ENCOUNTER — OUTPATIENT INFUSION SERVICES (OUTPATIENT)
Dept: ONCOLOGY | Facility: MEDICAL CENTER | Age: 55
End: 2025-03-08
Attending: INTERNAL MEDICINE
Payer: COMMERCIAL

## 2025-03-08 VITALS
RESPIRATION RATE: 17 BRPM | WEIGHT: 140.87 LBS | HEART RATE: 75 BPM | SYSTOLIC BLOOD PRESSURE: 134 MMHG | HEIGHT: 66 IN | TEMPERATURE: 97.7 F | OXYGEN SATURATION: 96 % | BODY MASS INDEX: 22.64 KG/M2 | DIASTOLIC BLOOD PRESSURE: 81 MMHG

## 2025-03-08 DIAGNOSIS — C43.8 MALIGNANT MELANOMA OF OVERLAPPING SITES (HCC): ICD-10-CM

## 2025-03-08 LAB
ALBUMIN SERPL BCP-MCNC: 4.1 G/DL (ref 3.2–4.9)
ALBUMIN/GLOB SERPL: 1.4 G/DL
ALP SERPL-CCNC: 74 U/L (ref 30–99)
ALT SERPL-CCNC: 16 U/L (ref 2–50)
ANION GAP SERPL CALC-SCNC: 11 MMOL/L (ref 7–16)
AST SERPL-CCNC: 23 U/L (ref 12–45)
BASOPHILS # BLD AUTO: 1 % (ref 0–1.8)
BASOPHILS # BLD: 0.05 K/UL (ref 0–0.12)
BILIRUB SERPL-MCNC: 0.4 MG/DL (ref 0.1–1.5)
BUN SERPL-MCNC: 15 MG/DL (ref 8–22)
CALCIUM ALBUM COR SERPL-MCNC: 8.7 MG/DL (ref 8.5–10.5)
CALCIUM SERPL-MCNC: 8.8 MG/DL (ref 8.5–10.5)
CHLORIDE SERPL-SCNC: 103 MMOL/L (ref 96–112)
CO2 SERPL-SCNC: 22 MMOL/L (ref 20–33)
CREAT SERPL-MCNC: 0.95 MG/DL (ref 0.5–1.4)
EOSINOPHIL # BLD AUTO: 0.07 K/UL (ref 0–0.51)
EOSINOPHIL NFR BLD: 1.4 % (ref 0–6.9)
ERYTHROCYTE [DISTWIDTH] IN BLOOD BY AUTOMATED COUNT: 42.3 FL (ref 35.9–50)
GFR SERPLBLD CREATININE-BSD FMLA CKD-EPI: 96 ML/MIN/1.73 M 2
GLOBULIN SER CALC-MCNC: 2.9 G/DL (ref 1.9–3.5)
GLUCOSE SERPL-MCNC: 123 MG/DL (ref 65–99)
HCT VFR BLD AUTO: 44.1 % (ref 42–52)
HGB BLD-MCNC: 15 G/DL (ref 14–18)
IMM GRANULOCYTES # BLD AUTO: 0.02 K/UL (ref 0–0.11)
IMM GRANULOCYTES NFR BLD AUTO: 0.4 % (ref 0–0.9)
LYMPHOCYTES # BLD AUTO: 1.16 K/UL (ref 1–4.8)
LYMPHOCYTES NFR BLD: 23.7 % (ref 22–41)
MCH RBC QN AUTO: 30.4 PG (ref 27–33)
MCHC RBC AUTO-ENTMCNC: 34 G/DL (ref 32.3–36.5)
MCV RBC AUTO: 89.3 FL (ref 81.4–97.8)
MONOCYTES # BLD AUTO: 0.45 K/UL (ref 0–0.85)
MONOCYTES NFR BLD AUTO: 9.2 % (ref 0–13.4)
NEUTROPHILS # BLD AUTO: 3.14 K/UL (ref 1.82–7.42)
NEUTROPHILS NFR BLD: 64.3 % (ref 44–72)
NRBC # BLD AUTO: 0 K/UL
NRBC BLD-RTO: 0 /100 WBC (ref 0–0.2)
OUTPT INFUS CBC COMMENT OICOM: ABNORMAL
PLATELET # BLD AUTO: 348 K/UL (ref 164–446)
PMV BLD AUTO: 8.2 FL (ref 9–12.9)
POTASSIUM SERPL-SCNC: 4.2 MMOL/L (ref 3.6–5.5)
PROT SERPL-MCNC: 7 G/DL (ref 6–8.2)
RBC # BLD AUTO: 4.94 M/UL (ref 4.7–6.1)
SODIUM SERPL-SCNC: 136 MMOL/L (ref 135–145)
T4 FREE SERPL-MCNC: 1.25 NG/DL (ref 0.93–1.7)
TSH SERPL-ACNC: 0.6 UIU/ML (ref 0.35–5.5)
WBC # BLD AUTO: 4.9 K/UL (ref 4.8–10.8)

## 2025-03-08 PROCEDURE — 96413 CHEMO IV INFUSION 1 HR: CPT

## 2025-03-08 PROCEDURE — 700111 HCHG RX REV CODE 636 W/ 250 OVERRIDE (IP): Mod: JZ | Performed by: INTERNAL MEDICINE

## 2025-03-08 PROCEDURE — 80053 COMPREHEN METABOLIC PANEL: CPT

## 2025-03-08 PROCEDURE — 84439 ASSAY OF FREE THYROXINE: CPT

## 2025-03-08 PROCEDURE — 700105 HCHG RX REV CODE 258: Performed by: INTERNAL MEDICINE

## 2025-03-08 PROCEDURE — 84443 ASSAY THYROID STIM HORMONE: CPT

## 2025-03-08 PROCEDURE — 85025 COMPLETE CBC W/AUTO DIFF WBC: CPT

## 2025-03-08 RX ADMIN — SODIUM CHLORIDE 200 MG: 9 INJECTION, SOLUTION INTRAVENOUS at 11:34

## 2025-03-08 ASSESSMENT — FIBROSIS 4 INDEX: FIB4 SCORE: 1.27

## 2025-03-08 NOTE — PROGRESS NOTES
"Pharmacy Chemotherapy Calculation:    Dx: Melanoma          Cycle 9 (delayed d/t pt illness)  Previous treatment: C8 on 2/8/25    Protocol: Pembrolizumab     *Dosing Reference*  Pembrolizumab 200 mg IV over 30 minutes on Day 1  21-day cycle for 12 months (adjuvant treatment)   NCCN Guidelines® for Melanoma: Cutaneous V.2.2024.   Shawna M, et al. Eur J Cancer. 2020;131:68-75.b   Huang RAMSEY, et al. N Engl J Med. 2015;372(26):2521-32.a   Mehrdad O, et al. Reba Oncol. 2019;30(4):582- 588.a   Marcus CASTRO, et al. N Engl J Med. 2018;378(19):5773-3727.a   Kris FERNANDEZ, et al. Future Oncol. 2020;16(3):0717-6151.b    Allergies:  Bactrim [sulfamethoxazole-trimethoprim]     /81   Pulse 75   Temp 36.5 °C (97.7 °F) (Temporal)   Resp 17   Ht 1.68 m (5' 6.14\")   Wt 63.9 kg (140 lb 14 oz)   SpO2 96%   BMI 22.64 kg/m²  Body surface area is 1.73 meters squared.    Labs 3/8/25:  ANC~ 3140 Plt = 348k   Hgb = 15     SCr = 0.95mg/dL CrCl ~ 81mL/min   AST/ALT/AP = WNL TBili = 0.4   TSH = 0.597   Free T4 = 1.25          Pembrolizumab 200 mg fixed dose   No calc required, OK to treat with final dose =  200 mg IV      Genevieve Hoang, PharmD  "

## 2025-03-08 NOTE — PROGRESS NOTES
Chemotherapy Verification - SECONDARY RN       Height = 1.68m  Weight = 63.9kg  BSA = 1.73m2       Medication: pembrolizumab  Dose: flat dose  Calculated Dose: 200mg                             (In mg/m2, AUC, mg/kg)       I confirm that this process was performed independently.

## 2025-03-08 NOTE — PROGRESS NOTES
Chemotherapy Verification - PRIMARY RN      Height = 168 cm  Weight = 63.9 kg  BSA = 1.73 m2       Medication: Keytruda  Dose: 200 mg set dose  Calculated Dose: 200 mg set dose                             (In mg/m2, AUC, mg/kg)         I confirm this process was performed independently with the BSA and all final chemotherapy dosing calculations congruent.  Any discrepancies of 10% or greater have been addressed with the chemotherapy pharmacist. The resolution of the discrepancy has been documented in the EPIC progress notes.

## 2025-03-08 NOTE — PROGRESS NOTES
Patient presents for Keytruda infusion. Reviewed plan of care patient verbalizes understanding. PIV established flushes well with brisk blood return. Lab results within parameters to proceed with treatment. Keytruda infused as ordered in line filter in place. PIV removed tip intact compression dressing to site. Patient scheduled for his next appointment and released in no acute distress.

## 2025-03-19 RX ORDER — 0.9 % SODIUM CHLORIDE 0.9 %
10 VIAL (ML) INJECTION PRN
Status: CANCELLED | OUTPATIENT
Start: 2025-03-29

## 2025-03-19 RX ORDER — EPINEPHRINE 1 MG/ML(1)
0.5 AMPUL (ML) INJECTION PRN
Status: CANCELLED | OUTPATIENT
Start: 2025-03-29

## 2025-03-19 RX ORDER — ONDANSETRON 2 MG/ML
4 INJECTION INTRAMUSCULAR; INTRAVENOUS PRN
Status: CANCELLED | OUTPATIENT
Start: 2025-03-29

## 2025-03-19 RX ORDER — METHYLPREDNISOLONE SODIUM SUCCINATE 125 MG/2ML
125 INJECTION, POWDER, LYOPHILIZED, FOR SOLUTION INTRAMUSCULAR; INTRAVENOUS PRN
Status: CANCELLED | OUTPATIENT
Start: 2025-03-29

## 2025-03-19 RX ORDER — 0.9 % SODIUM CHLORIDE 0.9 %
3 VIAL (ML) INJECTION PRN
Status: CANCELLED | OUTPATIENT
Start: 2025-03-28

## 2025-03-19 RX ORDER — ONDANSETRON 8 MG/1
8 TABLET, ORALLY DISINTEGRATING ORAL PRN
Status: CANCELLED | OUTPATIENT
Start: 2025-03-29

## 2025-03-19 RX ORDER — 0.9 % SODIUM CHLORIDE 0.9 %
10 VIAL (ML) INJECTION PRN
Status: CANCELLED | OUTPATIENT
Start: 2025-03-28

## 2025-03-19 RX ORDER — 0.9 % SODIUM CHLORIDE 0.9 %
VIAL (ML) INJECTION PRN
Status: CANCELLED | OUTPATIENT
Start: 2025-03-29

## 2025-03-19 RX ORDER — 0.9 % SODIUM CHLORIDE 0.9 %
3 VIAL (ML) INJECTION PRN
Status: CANCELLED | OUTPATIENT
Start: 2025-03-29

## 2025-03-19 RX ORDER — SODIUM CHLORIDE 9 MG/ML
INJECTION, SOLUTION INTRAVENOUS CONTINUOUS
Status: CANCELLED | OUTPATIENT
Start: 2025-03-29

## 2025-03-19 RX ORDER — 0.9 % SODIUM CHLORIDE 0.9 %
VIAL (ML) INJECTION PRN
Status: CANCELLED | OUTPATIENT
Start: 2025-03-28

## 2025-03-19 RX ORDER — PROCHLORPERAZINE MALEATE 10 MG
10 TABLET ORAL EVERY 6 HOURS PRN
Status: CANCELLED | OUTPATIENT
Start: 2025-03-29

## 2025-03-19 RX ORDER — DIPHENHYDRAMINE HYDROCHLORIDE 50 MG/ML
50 INJECTION, SOLUTION INTRAMUSCULAR; INTRAVENOUS PRN
Status: CANCELLED | OUTPATIENT
Start: 2025-03-29

## 2025-03-28 NOTE — PROGRESS NOTES
"Pharmacy Chemotherapy Calculation:    Dx: Melanoma        Cycle 10  Previous treatment: C9 3/8/25; started pembrolizuma 5/2024 @ [facility]    Protocol: Pembrolizumab     *Dosing Reference*  Pembrolizumab 200 mg IV over 30 minutes on Day 1  21-day cycle for 12 months (adjuvant treatment)   NCCN Guidelines® for Melanoma: Cutaneous V.2.2024.   Shawna M, et al. Eur J Cancer. 2020;131:68-75.b   Huang RAMSEY, et al. N Engl J Med. 2015;372(26):2521-32.a   Mehrdad O et al. Reba Oncol. 2019;30(4):582- 588.a   Marcus CASTRO et al. N Engl J Med. 2018;378(19):7377-9699.a   Kris FERNANDEZ, et al. Future Oncol. 2020;16(3):0574-7249.b    Allergies:  Bactrim [sulfamethoxazole-trimethoprim]     /80   Pulse 64   Temp 36.2 °C (97.2 °F) (Temporal)   Resp 17   Ht 1.68 m (5' 6.14\")   Wt 64 kg (141 lb 1.5 oz)   SpO2 98%   BMI 22.68 kg/m²  Body surface area is 1.73 meters squared.    Labs 3/28/25:  ANC~ 3060 Plt = 255k   Hgb = 15.6     SCr = 1.01 mg/dL CrCl ~ 76.5 mL/min   AST/ALT/AP = 17/13/81 TBili = 0.5   TSH = 1.34   Free T4 = 1.05    Pembrolizumab 200 mg fixed dose = 200 mg   No calc required, OK to treat with final dose =  200 mg IV  "

## 2025-03-28 NOTE — PROGRESS NOTES
"Pharmacy Chemotherapy Calculation:    Dx: Melenoma     Protocol: Pembrolizumab     *Dosing Reference*  Pembrolizumab 200 mg IV over 30 minutes on Day 1  21-day cycle for 12 months (adjuvant treatment)   NCCNGuidelines® for Melanoma: Cutaneous V.2.2024.   Shawna SINCLAIR et al. Eur J Cancer. 2020;131:68-75.b   Huang RAMSEY et al. N Engl J Med. 2015;372(26):2521-32.a   Mehrdad O  et al. Reba Oncol. 2019;30(4):582- 588.a   Marcus CASTRO et al. N Engl J Med. 2018;378(19):3521-2842.a   Krsi FERNANDEZ , et al. Future Oncol. 2020;16(3):6632-9956.b    Allergies:  Bactrim [sulfamethoxazole-trimethoprim]     /80   Pulse 64   Temp 36.2 °C (97.2 °F) (Temporal)   Resp 17   Ht 1.68 m (5' 6.14\")   Wt 64 kg (141 lb 1.5 oz)   SpO2 98%   BMI 22.68 kg/m²  Body surface area is 1.73 meters squared.    Labs 3/29/25:  ANC~ 3060 Plt = 255k   Hgb = 15.6     SCr = 1.01 mg/dL CrCl ~ 76.5 mL/min   AST/ALT/AP = WNL TBili = 0.5  TSH = 1.34 Free T4 = 1.05      Drug Order   (Drug name, dose, route, IV Fluid & volume, frequency, number of doses) Cycle 10  Previous treatment: C9 on 3/8/25   Medication = Pembrolizumab   Base Dose = 200 mg   Fixed dose, no calculation needed   Final Dose = 200 mg  Route = IV  Fluid & Volume = NS 50 mL  Admin Duration = Over 30 min           Fixed dose okay to treat with final dose   By my signature below, I confirm this process was performed independently with the BSA and all final chemotherapy dosing calculations congruent. I have reviewed the above chemotherapy order and that my calculation of the final dose and BSA (when applicable) corroborate those calculations of the  pharmacist. Discrepancies of 10% or greater in the written dose have been addressed and documented within the [facility] Progress notes.    Ilene Pineda, PharmD  "

## 2025-03-29 ENCOUNTER — OUTPATIENT INFUSION SERVICES (OUTPATIENT)
Dept: ONCOLOGY | Facility: MEDICAL CENTER | Age: 55
End: 2025-03-29
Attending: INTERNAL MEDICINE
Payer: COMMERCIAL

## 2025-03-29 VITALS
RESPIRATION RATE: 17 BRPM | HEART RATE: 64 BPM | SYSTOLIC BLOOD PRESSURE: 136 MMHG | BODY MASS INDEX: 22.68 KG/M2 | WEIGHT: 141.09 LBS | HEIGHT: 66 IN | OXYGEN SATURATION: 98 % | DIASTOLIC BLOOD PRESSURE: 80 MMHG | TEMPERATURE: 97.2 F

## 2025-03-29 DIAGNOSIS — C43.8 MALIGNANT MELANOMA OF OVERLAPPING SITES (HCC): ICD-10-CM

## 2025-03-29 LAB
ALBUMIN SERPL BCP-MCNC: 4.4 G/DL (ref 3.2–4.9)
ALBUMIN/GLOB SERPL: 1.6 G/DL
ALP SERPL-CCNC: 81 U/L (ref 30–99)
ALT SERPL-CCNC: 13 U/L (ref 2–50)
ANION GAP SERPL CALC-SCNC: 10 MMOL/L (ref 7–16)
AST SERPL-CCNC: 17 U/L (ref 12–45)
BASOPHILS # BLD AUTO: 0.6 % (ref 0–1.8)
BASOPHILS # BLD: 0.03 K/UL (ref 0–0.12)
BILIRUB SERPL-MCNC: 0.5 MG/DL (ref 0.1–1.5)
BUN SERPL-MCNC: 21 MG/DL (ref 8–22)
CALCIUM ALBUM COR SERPL-MCNC: 8.8 MG/DL (ref 8.5–10.5)
CALCIUM SERPL-MCNC: 9.1 MG/DL (ref 8.5–10.5)
CHLORIDE SERPL-SCNC: 102 MMOL/L (ref 96–112)
CO2 SERPL-SCNC: 24 MMOL/L (ref 20–33)
CREAT SERPL-MCNC: 1.01 MG/DL (ref 0.5–1.4)
EOSINOPHIL # BLD AUTO: 0.09 K/UL (ref 0–0.51)
EOSINOPHIL NFR BLD: 1.9 % (ref 0–6.9)
ERYTHROCYTE [DISTWIDTH] IN BLOOD BY AUTOMATED COUNT: 43.1 FL (ref 35.9–50)
GFR SERPLBLD CREATININE-BSD FMLA CKD-EPI: 89 ML/MIN/1.73 M 2
GLOBULIN SER CALC-MCNC: 2.8 G/DL (ref 1.9–3.5)
GLUCOSE SERPL-MCNC: 87 MG/DL (ref 65–99)
HCT VFR BLD AUTO: 46.9 % (ref 42–52)
HGB BLD-MCNC: 15.6 G/DL (ref 14–18)
IMM GRANULOCYTES # BLD AUTO: 0.02 K/UL (ref 0–0.11)
IMM GRANULOCYTES NFR BLD AUTO: 0.4 % (ref 0–0.9)
LYMPHOCYTES # BLD AUTO: 1.15 K/UL (ref 1–4.8)
LYMPHOCYTES NFR BLD: 24.4 % (ref 22–41)
MCH RBC QN AUTO: 30.2 PG (ref 27–33)
MCHC RBC AUTO-ENTMCNC: 33.3 G/DL (ref 32.3–36.5)
MCV RBC AUTO: 90.7 FL (ref 81.4–97.8)
MONOCYTES # BLD AUTO: 0.37 K/UL (ref 0–0.85)
MONOCYTES NFR BLD AUTO: 7.8 % (ref 0–13.4)
NEUTROPHILS # BLD AUTO: 3.06 K/UL (ref 1.82–7.42)
NEUTROPHILS NFR BLD: 64.9 % (ref 44–72)
NRBC # BLD AUTO: 0 K/UL
NRBC BLD-RTO: 0 /100 WBC (ref 0–0.2)
OUTPT INFUS CBC COMMENT OICOM: ABNORMAL
PLATELET # BLD AUTO: 255 K/UL (ref 164–446)
PMV BLD AUTO: 8.8 FL (ref 9–12.9)
POTASSIUM SERPL-SCNC: 4.3 MMOL/L (ref 3.6–5.5)
PROT SERPL-MCNC: 7.2 G/DL (ref 6–8.2)
RBC # BLD AUTO: 5.17 M/UL (ref 4.7–6.1)
SODIUM SERPL-SCNC: 136 MMOL/L (ref 135–145)
T4 FREE SERPL-MCNC: 1.05 NG/DL (ref 0.93–1.7)
TSH SERPL-ACNC: 1.34 UIU/ML (ref 0.35–5.5)
WBC # BLD AUTO: 4.7 K/UL (ref 4.8–10.8)

## 2025-03-29 PROCEDURE — 700111 HCHG RX REV CODE 636 W/ 250 OVERRIDE (IP): Mod: JZ | Performed by: INTERNAL MEDICINE

## 2025-03-29 PROCEDURE — 84443 ASSAY THYROID STIM HORMONE: CPT

## 2025-03-29 PROCEDURE — 85025 COMPLETE CBC W/AUTO DIFF WBC: CPT

## 2025-03-29 PROCEDURE — 700105 HCHG RX REV CODE 258: Performed by: INTERNAL MEDICINE

## 2025-03-29 PROCEDURE — 84439 ASSAY OF FREE THYROXINE: CPT

## 2025-03-29 PROCEDURE — 96413 CHEMO IV INFUSION 1 HR: CPT

## 2025-03-29 PROCEDURE — 80053 COMPREHEN METABOLIC PANEL: CPT

## 2025-03-29 RX ADMIN — SODIUM CHLORIDE 200 MG: 9 INJECTION, SOLUTION INTRAVENOUS at 09:10

## 2025-03-29 ASSESSMENT — FIBROSIS 4 INDEX: FIB4 SCORE: 0.88

## 2025-03-29 NOTE — PROGRESS NOTES
Patient presents for day one cycle ten Keytruda. Reviewed plan of care patient verbalizes understanding. PIV established flushes well with brisk blood return. Labs within parameters to proceed with treatment. Keytruda infused as ordered in line filter in place. Infusion completed with no new patient complaints, line flushed clear. PIV removed catheter tip intact compression dressing to site. Patient scheduled for his next appointment and released in no acute distress.

## 2025-03-29 NOTE — PROGRESS NOTES
Chemotherapy Verification - PRIMARY RN      Height = 168 cm  Weight = 64 kg  BSA = 1.73 m2       Medication: Keytruda  Dose: 200 mg set dose  Calculated Dose: 200 mg set dose                             (In mg/m2, AUC, mg/kg)       I confirm this process was performed independently with the BSA and all final chemotherapy dosing calculations congruent.  Any discrepancies of 10% or greater have been addressed with the chemotherapy pharmacist. The resolution of the discrepancy has been documented in the EPIC progress notes.

## 2025-04-11 NOTE — PROGRESS NOTES
"Pharmacy Chemotherapy Calculation:    Dx: Melanoma          Cycle 10  Previous treatment: C9 on 3/29/25    Protocol: Pembrolizumab     *Dosing Reference*  Pembrolizumab 200 mg IV over 30 minutes on Day 1  21-day cycle for 12 months (adjuvant treatment)     NCCN Guidelines® for Melanoma: Cutaneous V.2.2024.   Shawna M et al. Eur J Cancer. 2020;131:68-75.b   Huang RAMSEY, et al. N Engl J Med. 2015;372(26):2521-32.a   Mehrdad O et al. Reba Oncol. 2019;30(4):582- 588.a   Marcus CASTRO et al. N Engl J Med. 2018;378(19):4076-1062.a   Kris FERNANDEZ, et al. Future Oncol. 2020;16(3):5818-7812.b    Allergies:  Bactrim [sulfamethoxazole-trimethoprim]     /74   Pulse 74   Temp 36.3 °C (97.4 °F) (Temporal)   Resp 17   Ht 1.675 m (5' 5.95\")   Wt 65.3 kg (143 lb 15.4 oz)   SpO2 97%   BMI 23.27 kg/m²  Body surface area is 1.74 meters squared.    Labs 4/19/25:  ANC~ 3790 Plt = 268k   Hgb = 16.6     SCr = 0.93 mg/dL CrCl ~ 85mL/min   AST/ALT/AP = WNL TBili = 0.5   TSH = 1.35   Free T4 = 1.13        Pembrolizumab 200 mg fixed dose   No calc required, OK to treat with final dose =  200 mg IV      Genevieve Hoang, PharmD  "

## 2025-04-16 RX ORDER — 0.9 % SODIUM CHLORIDE 0.9 %
3 VIAL (ML) INJECTION PRN
Status: CANCELLED | OUTPATIENT
Start: 2025-04-19

## 2025-04-16 RX ORDER — 0.9 % SODIUM CHLORIDE 0.9 %
VIAL (ML) INJECTION PRN
Status: CANCELLED | OUTPATIENT
Start: 2025-04-19

## 2025-04-16 RX ORDER — 0.9 % SODIUM CHLORIDE 0.9 %
10 VIAL (ML) INJECTION PRN
Status: CANCELLED | OUTPATIENT
Start: 2025-04-18

## 2025-04-16 RX ORDER — PROCHLORPERAZINE MALEATE 10 MG
10 TABLET ORAL EVERY 6 HOURS PRN
Status: CANCELLED | OUTPATIENT
Start: 2025-04-19

## 2025-04-16 RX ORDER — 0.9 % SODIUM CHLORIDE 0.9 %
10 VIAL (ML) INJECTION PRN
Status: CANCELLED | OUTPATIENT
Start: 2025-04-19

## 2025-04-16 RX ORDER — ONDANSETRON 2 MG/ML
4 INJECTION INTRAMUSCULAR; INTRAVENOUS PRN
Status: CANCELLED | OUTPATIENT
Start: 2025-04-19

## 2025-04-16 RX ORDER — 0.9 % SODIUM CHLORIDE 0.9 %
3 VIAL (ML) INJECTION PRN
Status: CANCELLED | OUTPATIENT
Start: 2025-04-18

## 2025-04-16 RX ORDER — ONDANSETRON 8 MG/1
8 TABLET, ORALLY DISINTEGRATING ORAL PRN
Status: CANCELLED | OUTPATIENT
Start: 2025-04-19

## 2025-04-16 RX ORDER — 0.9 % SODIUM CHLORIDE 0.9 %
VIAL (ML) INJECTION PRN
Status: CANCELLED | OUTPATIENT
Start: 2025-04-18

## 2025-04-16 RX ORDER — METHYLPREDNISOLONE SODIUM SUCCINATE 125 MG/2ML
125 INJECTION, POWDER, LYOPHILIZED, FOR SOLUTION INTRAMUSCULAR; INTRAVENOUS PRN
Status: CANCELLED | OUTPATIENT
Start: 2025-04-19

## 2025-04-16 RX ORDER — EPINEPHRINE 1 MG/ML(1)
0.5 AMPUL (ML) INJECTION PRN
Status: CANCELLED | OUTPATIENT
Start: 2025-04-19

## 2025-04-16 RX ORDER — SODIUM CHLORIDE 9 MG/ML
INJECTION, SOLUTION INTRAVENOUS CONTINUOUS
Status: CANCELLED | OUTPATIENT
Start: 2025-04-19

## 2025-04-16 RX ORDER — DIPHENHYDRAMINE HYDROCHLORIDE 50 MG/ML
50 INJECTION, SOLUTION INTRAMUSCULAR; INTRAVENOUS PRN
Status: CANCELLED | OUTPATIENT
Start: 2025-04-19

## 2025-04-19 ENCOUNTER — OUTPATIENT INFUSION SERVICES (OUTPATIENT)
Dept: ONCOLOGY | Facility: MEDICAL CENTER | Age: 55
End: 2025-04-19
Attending: INTERNAL MEDICINE
Payer: COMMERCIAL

## 2025-04-19 VITALS
TEMPERATURE: 97.4 F | RESPIRATION RATE: 17 BRPM | DIASTOLIC BLOOD PRESSURE: 74 MMHG | HEIGHT: 66 IN | WEIGHT: 143.96 LBS | SYSTOLIC BLOOD PRESSURE: 132 MMHG | BODY MASS INDEX: 23.14 KG/M2 | HEART RATE: 74 BPM | OXYGEN SATURATION: 97 %

## 2025-04-19 DIAGNOSIS — C43.8 MALIGNANT MELANOMA OF OVERLAPPING SITES (HCC): ICD-10-CM

## 2025-04-19 LAB
ALBUMIN SERPL BCP-MCNC: 4.2 G/DL (ref 3.2–4.9)
ALBUMIN/GLOB SERPL: 1.4 G/DL
ALP SERPL-CCNC: 87 U/L (ref 30–99)
ALT SERPL-CCNC: 15 U/L (ref 2–50)
ANION GAP SERPL CALC-SCNC: 10 MMOL/L (ref 7–16)
AST SERPL-CCNC: 24 U/L (ref 12–45)
BASOPHILS # BLD AUTO: 0.4 % (ref 0–1.8)
BASOPHILS # BLD: 0.02 K/UL (ref 0–0.12)
BILIRUB SERPL-MCNC: 0.5 MG/DL (ref 0.1–1.5)
BUN SERPL-MCNC: 19 MG/DL (ref 8–22)
CALCIUM ALBUM COR SERPL-MCNC: 8.5 MG/DL (ref 8.5–10.5)
CALCIUM SERPL-MCNC: 8.7 MG/DL (ref 8.5–10.5)
CHLORIDE SERPL-SCNC: 103 MMOL/L (ref 96–112)
CO2 SERPL-SCNC: 24 MMOL/L (ref 20–33)
CREAT SERPL-MCNC: 0.93 MG/DL (ref 0.5–1.4)
EOSINOPHIL # BLD AUTO: 0.1 K/UL (ref 0–0.51)
EOSINOPHIL NFR BLD: 1.8 % (ref 0–6.9)
ERYTHROCYTE [DISTWIDTH] IN BLOOD BY AUTOMATED COUNT: 43 FL (ref 35.9–50)
GFR SERPLBLD CREATININE-BSD FMLA CKD-EPI: 98 ML/MIN/1.73 M 2
GLOBULIN SER CALC-MCNC: 3.1 G/DL (ref 1.9–3.5)
GLUCOSE SERPL-MCNC: 102 MG/DL (ref 65–99)
HCT VFR BLD AUTO: 49.9 % (ref 42–52)
HGB BLD-MCNC: 16.6 G/DL (ref 14–18)
IMM GRANULOCYTES # BLD AUTO: 0.01 K/UL (ref 0–0.11)
IMM GRANULOCYTES NFR BLD AUTO: 0.2 % (ref 0–0.9)
LYMPHOCYTES # BLD AUTO: 1.07 K/UL (ref 1–4.8)
LYMPHOCYTES NFR BLD: 19.5 % (ref 22–41)
MCH RBC QN AUTO: 30.3 PG (ref 27–33)
MCHC RBC AUTO-ENTMCNC: 33.3 G/DL (ref 32.3–36.5)
MCV RBC AUTO: 91.1 FL (ref 81.4–97.8)
MONOCYTES # BLD AUTO: 0.49 K/UL (ref 0–0.85)
MONOCYTES NFR BLD AUTO: 8.9 % (ref 0–13.4)
NEUTROPHILS # BLD AUTO: 3.79 K/UL (ref 1.82–7.42)
NEUTROPHILS NFR BLD: 69.2 % (ref 44–72)
NRBC # BLD AUTO: 0 K/UL
NRBC BLD-RTO: 0 /100 WBC (ref 0–0.2)
OUTPT INFUS CBC COMMENT OICOM: ABNORMAL
PLATELET # BLD AUTO: 268 K/UL (ref 164–446)
PMV BLD AUTO: 8.7 FL (ref 9–12.9)
POTASSIUM SERPL-SCNC: 4.2 MMOL/L (ref 3.6–5.5)
PROT SERPL-MCNC: 7.3 G/DL (ref 6–8.2)
RBC # BLD AUTO: 5.48 M/UL (ref 4.7–6.1)
SODIUM SERPL-SCNC: 137 MMOL/L (ref 135–145)
T4 FREE SERPL-MCNC: 1.13 NG/DL (ref 0.93–1.7)
TSH SERPL-ACNC: 1.35 UIU/ML (ref 0.38–5.33)
WBC # BLD AUTO: 5.5 K/UL (ref 4.8–10.8)

## 2025-04-19 PROCEDURE — 700105 HCHG RX REV CODE 258: Performed by: INTERNAL MEDICINE

## 2025-04-19 PROCEDURE — 700111 HCHG RX REV CODE 636 W/ 250 OVERRIDE (IP): Mod: JZ | Performed by: INTERNAL MEDICINE

## 2025-04-19 PROCEDURE — 85025 COMPLETE CBC W/AUTO DIFF WBC: CPT

## 2025-04-19 PROCEDURE — 80053 COMPREHEN METABOLIC PANEL: CPT

## 2025-04-19 PROCEDURE — 84443 ASSAY THYROID STIM HORMONE: CPT

## 2025-04-19 PROCEDURE — 84439 ASSAY OF FREE THYROXINE: CPT

## 2025-04-19 PROCEDURE — 96413 CHEMO IV INFUSION 1 HR: CPT

## 2025-04-19 RX ADMIN — SODIUM CHLORIDE 200 MG: 9 INJECTION, SOLUTION INTRAVENOUS at 09:33

## 2025-04-19 ASSESSMENT — PAIN DESCRIPTION - PAIN TYPE: TYPE: ACUTE PAIN

## 2025-04-19 ASSESSMENT — FIBROSIS 4 INDEX: FIB4 SCORE: 0.98

## 2025-04-19 NOTE — PROGRESS NOTES
Chemotherapy Verification - PRIMARY RN      Height = 167.5cm  Weight = 65.3kg  BSA = 1.74m2       Medication: pembrolizumab (Keytruda)  Dose: 200mg (set dose)  Calculated Dose: 200mg (set dose)                             (In mg/m2, AUC, mg/kg)       I confirm this process was performed independently with the BSA and all final chemotherapy dosing calculations congruent.  Any discrepancies of 10% or greater have been addressed with the chemotherapy pharmacist. The resolution of the discrepancy has been documented in the EPIC progress notes.

## 2025-04-19 NOTE — PROGRESS NOTES
Chemotherapy Verification - SECONDARY RN       Height = 167.5 cm  Weight = 65.3 kg  BSA = 1.74 m2       Medication: Keytruda  Dose: 200 mg (set dose)  Calculated Dose: 200 mg                             (In mg/m2, AUC, mg/kg)       I confirm that this process was performed independently.

## 2025-04-19 NOTE — PROGRESS NOTES
"Pharmacy Chemotherapy Calculation:    Dx: Melenoma     Protocol: Pembrolizumab     *Dosing Reference*  Pembrolizumab 200 mg IV over 30 minutes on Day 1  21-day cycle for 12 months (adjuvant treatment)   NCCNGuidelines® for Melanoma: Cutaneous V.2.2024.   Shawna SINCLAIR et al. Eur J Cancer. 2020;131:68-75.b   Huang RAMSEY et al. N Engl J Med. 2015;372(26):2521-32.a   Mehrdad O  et al. Reba Oncol. 2019;30(4):582- 588.a   Mracus CASTRO et al. N Engl J Med. 2018;378(19):0659-1689.a   Kris FERNANDEZ , et al. Future Oncol. 2020;16(3):3002-1132.b    Allergies:  Bactrim [sulfamethoxazole-trimethoprim]     /74   Pulse 74   Temp 36.3 °C (97.4 °F) (Temporal)   Resp 17   Ht 1.675 m (5' 5.95\")   Wt 65.3 kg (143 lb 15.4 oz)   SpO2 97%   BMI 23.27 kg/m²  Body surface area is 1.74 meters squared.    Labs 4/19/25  ANC~ 3790 Plt = 268k   Hgb = 16.6     SCr = 0.93 mg/dL CrCl ~ 84.7 mL/min   LFT's = WNLs  TBili = 0.5   TSH = 1.35 Free T4 = 1.13    Drug Order   (Drug name, dose, route, IV Fluid & volume, frequency, number of doses) Cycle 11  Previous treatment: C10 on 3/29/25   Medication = Pembrolizumab   Base Dose = 200 mg   Fixed dose, no calculation needed   Final Dose = 200 mg  Route = IV  Fluid & Volume = NS 50 mL  Admin Duration = Over 30 min           Fixed dose okay to treat with final dose   By my signature below, I confirm this process was performed independently with the BSA and all final chemotherapy dosing calculations congruent. I have reviewed the above chemotherapy order and that my calculation of the final dose and BSA (when applicable) corroborate those calculations of the  pharmacist. Discrepancies of 10% or greater in the written dose have been addressed and documented within the [facility] Progress notes.    Artem Cantrell, PharmD  "

## 2025-04-19 NOTE — PROGRESS NOTES
Efrain arrived ambulatory to [facility] for Day 1, Cycle 11 of Keytruda, patient reports that this is his second to last treatment. Plan of care reviewed, assessment completed. Patient reports tolerating past treatments well. Patient denies any signs or symptoms of infection, illness or fevers.  PIV started x1 attempt to the right upper FA, flushed briskly, brisk blood return noted. Labs drawn as ordered off of IV start.   Labs reviewed, patient is within set parameters for treatment today.   Keytruda administered per the MAR with a 0.2 micron in-line filter in place. Patient tolerated todays treatment well.   PIV flushed post infusion, positive blood return noted. PIV removed with tip intact, site dressed with gauze and coban.   Next appointment was confirmed with the patient and he was discharged home in stable condition.

## 2025-05-09 RX ORDER — 0.9 % SODIUM CHLORIDE 0.9 %
10 VIAL (ML) INJECTION PRN
Status: CANCELLED | OUTPATIENT
Start: 2025-05-10

## 2025-05-09 RX ORDER — SODIUM CHLORIDE 9 MG/ML
INJECTION, SOLUTION INTRAVENOUS CONTINUOUS
Status: CANCELLED | OUTPATIENT
Start: 2025-05-10

## 2025-05-09 RX ORDER — 0.9 % SODIUM CHLORIDE 0.9 %
10 VIAL (ML) INJECTION PRN
Status: CANCELLED | OUTPATIENT
Start: 2025-05-09

## 2025-05-09 RX ORDER — ONDANSETRON 8 MG/1
8 TABLET, ORALLY DISINTEGRATING ORAL PRN
Status: CANCELLED | OUTPATIENT
Start: 2025-05-10

## 2025-05-09 RX ORDER — EPINEPHRINE 1 MG/ML(1)
0.5 AMPUL (ML) INJECTION PRN
Status: CANCELLED | OUTPATIENT
Start: 2025-05-10

## 2025-05-09 RX ORDER — 0.9 % SODIUM CHLORIDE 0.9 %
VIAL (ML) INJECTION PRN
Status: CANCELLED | OUTPATIENT
Start: 2025-05-10

## 2025-05-09 RX ORDER — METHYLPREDNISOLONE SODIUM SUCCINATE 125 MG/2ML
125 INJECTION, POWDER, LYOPHILIZED, FOR SOLUTION INTRAMUSCULAR; INTRAVENOUS PRN
Status: CANCELLED | OUTPATIENT
Start: 2025-05-10

## 2025-05-09 RX ORDER — 0.9 % SODIUM CHLORIDE 0.9 %
VIAL (ML) INJECTION PRN
Status: CANCELLED | OUTPATIENT
Start: 2025-05-09

## 2025-05-09 RX ORDER — ONDANSETRON 2 MG/ML
4 INJECTION INTRAMUSCULAR; INTRAVENOUS PRN
Status: CANCELLED | OUTPATIENT
Start: 2025-05-10

## 2025-05-09 RX ORDER — DIPHENHYDRAMINE HYDROCHLORIDE 50 MG/ML
50 INJECTION, SOLUTION INTRAMUSCULAR; INTRAVENOUS PRN
Status: CANCELLED | OUTPATIENT
Start: 2025-05-10

## 2025-05-09 RX ORDER — 0.9 % SODIUM CHLORIDE 0.9 %
3 VIAL (ML) INJECTION PRN
Status: CANCELLED | OUTPATIENT
Start: 2025-05-10

## 2025-05-09 RX ORDER — PROCHLORPERAZINE MALEATE 10 MG
10 TABLET ORAL EVERY 6 HOURS PRN
Status: CANCELLED | OUTPATIENT
Start: 2025-05-10

## 2025-05-09 RX ORDER — 0.9 % SODIUM CHLORIDE 0.9 %
3 VIAL (ML) INJECTION PRN
Status: CANCELLED | OUTPATIENT
Start: 2025-05-09

## 2025-05-10 ENCOUNTER — OUTPATIENT INFUSION SERVICES (OUTPATIENT)
Dept: ONCOLOGY | Facility: MEDICAL CENTER | Age: 55
End: 2025-05-10
Attending: INTERNAL MEDICINE
Payer: COMMERCIAL

## 2025-05-10 VITALS
DIASTOLIC BLOOD PRESSURE: 84 MMHG | RESPIRATION RATE: 18 BRPM | OXYGEN SATURATION: 96 % | TEMPERATURE: 98.3 F | SYSTOLIC BLOOD PRESSURE: 141 MMHG | HEART RATE: 71 BPM | HEIGHT: 66 IN | WEIGHT: 141.98 LBS | BODY MASS INDEX: 22.82 KG/M2

## 2025-05-10 DIAGNOSIS — C43.8 MALIGNANT MELANOMA OF OVERLAPPING SITES (HCC): ICD-10-CM

## 2025-05-10 LAB
ALBUMIN SERPL BCP-MCNC: 4.2 G/DL (ref 3.2–4.9)
ALBUMIN/GLOB SERPL: 1.4 G/DL
ALP SERPL-CCNC: 84 U/L (ref 30–99)
ALT SERPL-CCNC: 14 U/L (ref 2–50)
ANION GAP SERPL CALC-SCNC: 10 MMOL/L (ref 7–16)
AST SERPL-CCNC: 19 U/L (ref 12–45)
BASOPHILS # BLD AUTO: 0.5 % (ref 0–1.8)
BASOPHILS # BLD: 0.03 K/UL (ref 0–0.12)
BILIRUB SERPL-MCNC: 0.6 MG/DL (ref 0.1–1.5)
BUN SERPL-MCNC: 17 MG/DL (ref 8–22)
CALCIUM ALBUM COR SERPL-MCNC: 8.6 MG/DL (ref 8.5–10.5)
CALCIUM SERPL-MCNC: 8.8 MG/DL (ref 8.5–10.5)
CHLORIDE SERPL-SCNC: 105 MMOL/L (ref 96–112)
CO2 SERPL-SCNC: 21 MMOL/L (ref 20–33)
CREAT SERPL-MCNC: 0.86 MG/DL (ref 0.5–1.4)
EOSINOPHIL # BLD AUTO: 0.09 K/UL (ref 0–0.51)
EOSINOPHIL NFR BLD: 1.6 % (ref 0–6.9)
ERYTHROCYTE [DISTWIDTH] IN BLOOD BY AUTOMATED COUNT: 41.8 FL (ref 35.9–50)
GFR SERPLBLD CREATININE-BSD FMLA CKD-EPI: 103 ML/MIN/1.73 M 2
GLOBULIN SER CALC-MCNC: 2.9 G/DL (ref 1.9–3.5)
GLUCOSE SERPL-MCNC: 120 MG/DL (ref 65–99)
HCT VFR BLD AUTO: 46.3 % (ref 42–52)
HGB BLD-MCNC: 16.1 G/DL (ref 14–18)
IMM GRANULOCYTES # BLD AUTO: 0.03 K/UL (ref 0–0.11)
IMM GRANULOCYTES NFR BLD AUTO: 0.5 % (ref 0–0.9)
LYMPHOCYTES # BLD AUTO: 1.04 K/UL (ref 1–4.8)
LYMPHOCYTES NFR BLD: 18.4 % (ref 22–41)
MCH RBC QN AUTO: 31 PG (ref 27–33)
MCHC RBC AUTO-ENTMCNC: 34.8 G/DL (ref 32.3–36.5)
MCV RBC AUTO: 89 FL (ref 81.4–97.8)
MONOCYTES # BLD AUTO: 0.58 K/UL (ref 0–0.85)
MONOCYTES NFR BLD AUTO: 10.2 % (ref 0–13.4)
NEUTROPHILS # BLD AUTO: 3.89 K/UL (ref 1.82–7.42)
NEUTROPHILS NFR BLD: 68.8 % (ref 44–72)
NRBC # BLD AUTO: 0 K/UL
NRBC BLD-RTO: 0 /100 WBC (ref 0–0.2)
OUTPT INFUS CBC COMMENT OICOM: ABNORMAL
PLATELET # BLD AUTO: 277 K/UL (ref 164–446)
PMV BLD AUTO: 8.6 FL (ref 9–12.9)
POTASSIUM SERPL-SCNC: 4.1 MMOL/L (ref 3.6–5.5)
PROT SERPL-MCNC: 7.1 G/DL (ref 6–8.2)
RBC # BLD AUTO: 5.2 M/UL (ref 4.7–6.1)
SODIUM SERPL-SCNC: 136 MMOL/L (ref 135–145)
T4 FREE SERPL-MCNC: 1.09 NG/DL (ref 0.93–1.7)
TSH SERPL-ACNC: 0.97 UIU/ML (ref 0.38–5.33)
WBC # BLD AUTO: 5.7 K/UL (ref 4.8–10.8)

## 2025-05-10 PROCEDURE — 80053 COMPREHEN METABOLIC PANEL: CPT

## 2025-05-10 PROCEDURE — 85025 COMPLETE CBC W/AUTO DIFF WBC: CPT

## 2025-05-10 PROCEDURE — 84443 ASSAY THYROID STIM HORMONE: CPT

## 2025-05-10 PROCEDURE — 96413 CHEMO IV INFUSION 1 HR: CPT

## 2025-05-10 PROCEDURE — 84439 ASSAY OF FREE THYROXINE: CPT

## 2025-05-10 PROCEDURE — 700111 HCHG RX REV CODE 636 W/ 250 OVERRIDE (IP): Mod: JZ | Performed by: INTERNAL MEDICINE

## 2025-05-10 PROCEDURE — 700105 HCHG RX REV CODE 258: Performed by: INTERNAL MEDICINE

## 2025-05-10 RX ADMIN — SODIUM CHLORIDE 200 MG: 9 INJECTION, SOLUTION INTRAVENOUS at 09:23

## 2025-05-10 ASSESSMENT — FIBROSIS 4 INDEX: FIB4 SCORE: 1.23

## 2025-05-10 NOTE — PROGRESS NOTES
Chemotherapy Verification - SECONDARY RN       Height = 1.67m  Weight = 64.4kg  BSA = 1.73m2       Medication: pembrolizumab  Dose: flat dose  Calculated Dose: 200mg                             (In mg/m2, AUC, mg/kg)         I confirm that this process was performed independently.

## 2025-05-10 NOTE — PROGRESS NOTES
Efrain presents to infusion for cycle 12 of Keytruda for Melanoma. He denies having any new or acute complaints today, reports tolerating past treatments well but does report fatigue. PIV started with positive return and labs drawn off of IV start.     Labs reviewed by RN, within parameters for treatment today.     Keytruda given over 30 minutes with an inline filter.  Patient tolerated well with no adverse effects.     PIV flushed post infusion with positive blood return, removed with tip intact. Patient left in stable condition knows to follow up with ordering provider with this being his last appointment for treatment.

## 2025-05-10 NOTE — PROGRESS NOTES
"Pharmacy Chemotherapy Calculation:    Dx: Melanoma          Cycle 11  Previous treatment: C10 on 4/19/25    Protocol: Pembrolizumab     *Dosing Reference*  Pembrolizumab 200 mg IV over 30 minutes on Day 1  21-day cycle for 12 months (adjuvant treatment)     NCCN Guidelines® for Melanoma: Cutaneous V.2.2024.   Shawna M et al. Eur J Cancer. 2020;131:68-75.b   Huang RAMSEY, et al. N Engl J Med. 2015;372(26):2521-32.a   Mehrdad O et al. Reba Oncol. 2019;30(4):582- 588.a   Marcus CASTRO et al. N Engl J Med. 2018;378(19):6505-8685.a   Kris FERNANDEZ, et al. Future Oncol. 2020;16(3):5733-4415.b    Allergies:  Bactrim [sulfamethoxazole-trimethoprim]     BP (!) 141/84   Pulse 71   Temp 36.8 °C (98.3 °F) (Temporal)   Resp 18   Ht 1.67 m (5' 5.75\")   Wt 64.4 kg (141 lb 15.6 oz)   SpO2 96%   BMI 23.09 kg/m²  Body surface area is 1.73 meters squared.    Labs 5/10/25  ANC~ 3890 Plt = 277k   Hgb = 16.1     SCr = 0.86 mg/dL CrCl ~ 90.3 mL/min   LFT's = WNLs  TBili = 0.6   TSH = 0.968 Free T4 = 1.09     Pembrolizumab 200 mg fixed dose   No calc required, OK to treat with final dose =  200 mg IV    Artem Cantrell, PharmD  "

## 2025-05-10 NOTE — PROGRESS NOTES
Chemotherapy Verification - PRIMARY RN      Height = 1.67 m  Weight = 64.4 kg  BSA = 1.73 m2       Medication: pembrolizumab (Keytruda)  Dose: 200 mg set dose  Calculated Dose: 200 mg                             (In mg/m2, AUC, mg/kg)       I confirm this process was performed independently with the BSA and all final chemotherapy dosing calculations congruent.  Any discrepancies of 10% or greater have been addressed with the chemotherapy pharmacist. The resolution of the discrepancy has been documented in the EPIC progress notes.

## 2025-05-10 NOTE — PROGRESS NOTES
"Pharmacy Chemotherapy Calculation:    Dx: Melanoma          Protocol: Pembrolizumab     *Dosing Reference*  Pembrolizumab 200 mg IV over 30 minutes on Day 1  21-day cycle for 12 months (adjuvant treatment)     NCCN Guidelines® for Melanoma: Cutaneous V.2.2024.   Shawna M et al. Eur J Cancer. 2020;131:68-75.b   Huang RAMSEY et al. N Engl J Med. 2015;372(26):2521-32.a   Mehrdad O, et al. Reba Oncol. 2019;30(4):582- 588.a   Marcus CASTRO et al. N Engl J Med. 2018;378(19):3564-3893.a   Kris ALEJANDRAJ, et al. Future Oncol. 2020;16(3):5775-8260.b    Allergies:  Bactrim [sulfamethoxazole-trimethoprim]     BP (!) 141/84   Pulse 71   Temp 36.8 °C (98.3 °F) (Temporal)   Resp 18   Ht 1.67 m (5' 5.75\")   Wt 64.4 kg (141 lb 15.6 oz)   SpO2 96%   BMI 23.09 kg/m²  Body surface area is 1.73 meters squared.    Labs 5/10/25:  ANC~ 3890 Plt = 277k   Hgb = 16.1     SCr = 0.86 mg/dL CrCl ~ 90.3 mL/min   AST/ALT/AP = 19/14/84 TBili = 0.6     TSH = 0.968   Free T4 = 1.09        Drug Order   (Drug name, dose, route, IV Fluid & volume, frequency, number of doses) Cycle 12  Previous treatment: C11 4/19/25   Medication = Pembrolizumab   Base Dose = 200 mg   Fixed dose; no calc required   Final Dose = 200 mg  Route = IV  Fluid & Volume = NS 50 mL  Admin Duration = Over 30 minutes           Fixed dose, okay to treat with final dose     By my signature below, I confirm this process was performed independently with the BSA and all final chemotherapy dosing calculations congruent. I have reviewed the above chemotherapy order and that my calculation of the final dose and BSA (when applicable) corroborate those calculations of the  pharmacist. Discrepancies of 10% or greater in the written dose have been addressed and documented within the [facility] Progress notes.    Oscar AparicioD  "

## 2025-07-09 ENCOUNTER — APPOINTMENT (OUTPATIENT)
Dept: MEDICAL GROUP | Facility: PHYSICIAN GROUP | Age: 55
End: 2025-07-09
Payer: COMMERCIAL

## 2025-07-25 ENCOUNTER — APPOINTMENT (OUTPATIENT)
Dept: MEDICAL GROUP | Facility: PHYSICIAN GROUP | Age: 55
End: 2025-07-25
Payer: COMMERCIAL

## 2025-08-05 ENCOUNTER — NON-PROVIDER VISIT (OUTPATIENT)
Dept: URGENT CARE | Facility: PHYSICIAN GROUP | Age: 55
End: 2025-08-05
Payer: COMMERCIAL

## 2025-08-05 ENCOUNTER — OCCUPATIONAL MEDICINE (OUTPATIENT)
Dept: URGENT CARE | Facility: PHYSICIAN GROUP | Age: 55
End: 2025-08-05
Payer: COMMERCIAL

## 2025-08-05 ENCOUNTER — HOSPITAL ENCOUNTER (OUTPATIENT)
Dept: RADIOLOGY | Facility: MEDICAL CENTER | Age: 55
End: 2025-08-05
Attending: PHYSICIAN ASSISTANT
Payer: COMMERCIAL

## 2025-08-05 VITALS
TEMPERATURE: 97.9 F | HEART RATE: 67 BPM | OXYGEN SATURATION: 97 % | HEIGHT: 66 IN | BODY MASS INDEX: 22.92 KG/M2 | DIASTOLIC BLOOD PRESSURE: 84 MMHG | RESPIRATION RATE: 16 BRPM | SYSTOLIC BLOOD PRESSURE: 130 MMHG | WEIGHT: 142.64 LBS
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
## 2025-08-05 DIAGNOSIS — S93.402A SPRAIN OF LEFT ANKLE, UNSPECIFIED LIGAMENT, INITIAL ENCOUNTER: Primary | ICD-10-CM
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
## 2025-08-05 LAB
AMP AMPHETAMINE: NORMAL
BREATH ALCOHOL COMMENT: NORMAL
COC COCAINE: NORMAL
INT CON NEG: NORMAL
INT CON POS: NORMAL
MET METHAMPHETAMINES: NORMAL
OPI OPIATES: NORMAL
PCP PHENCYCLIDINE: NORMAL
POC BREATHALIZER: 0 PERCENT (ref 0–0.01)
POC DRUG COMMENT 753798-OCCUPATIONAL HEALTH: NEGATIVE
THC: NORMAL

## 2025-08-05 PROCEDURE — 3075F SYST BP GE 130 - 139MM HG: CPT | Performed by: PHYSICIAN ASSISTANT

## 2025-08-05 PROCEDURE — 3079F DIAST BP 80-89 MM HG: CPT | Performed by: PHYSICIAN ASSISTANT

## 2025-08-05 PROCEDURE — 73610 X-RAY EXAM OF ANKLE: CPT | Mod: LT

## 2025-08-05 PROCEDURE — 82075 ASSAY OF BREATH ETHANOL: CPT | Performed by: PHYSICIAN ASSISTANT

## 2025-08-05 PROCEDURE — 80305 DRUG TEST PRSMV DIR OPT OBS: CPT | Performed by: PHYSICIAN ASSISTANT

## 2025-08-05 PROCEDURE — 99213 OFFICE O/P EST LOW 20 MIN: CPT | Performed by: PHYSICIAN ASSISTANT

## 2025-08-05 RX ORDER — OMEGA-3 FATTY ACIDS/FISH OIL 300-1000MG
200 CAPSULE ORAL
COMMUNITY

## 2025-08-05 ASSESSMENT — FIBROSIS 4 INDEX: FIB4 SCORE: 0.97

## 2025-08-11 ENCOUNTER — OCCUPATIONAL MEDICINE (OUTPATIENT)
Dept: URGENT CARE | Facility: PHYSICIAN GROUP | Age: 55
End: 2025-08-11
Payer: COMMERCIAL

## 2025-08-11 VITALS
DIASTOLIC BLOOD PRESSURE: 70 MMHG | WEIGHT: 143.3 LBS | RESPIRATION RATE: 16 BRPM | HEIGHT: 66 IN | TEMPERATURE: 98.4 F | SYSTOLIC BLOOD PRESSURE: 120 MMHG | OXYGEN SATURATION: 97 % | HEART RATE: 65 BPM | BODY MASS INDEX: 23.03 KG/M2

## 2025-08-11 DIAGNOSIS — S93.402D SPRAIN OF LEFT ANKLE, UNSPECIFIED LIGAMENT, SUBSEQUENT ENCOUNTER: Primary | ICD-10-CM

## 2025-08-11 PROCEDURE — 3074F SYST BP LT 130 MM HG: CPT

## 2025-08-11 PROCEDURE — 3078F DIAST BP <80 MM HG: CPT

## 2025-08-11 PROCEDURE — 99213 OFFICE O/P EST LOW 20 MIN: CPT

## 2025-08-11 ASSESSMENT — FIBROSIS 4 INDEX: FIB4 SCORE: 0.97

## 2025-08-17 ENCOUNTER — OCCUPATIONAL MEDICINE (OUTPATIENT)
Dept: URGENT CARE | Facility: PHYSICIAN GROUP | Age: 55
End: 2025-08-17
Payer: COMMERCIAL

## 2025-08-17 VITALS
RESPIRATION RATE: 17 BRPM | DIASTOLIC BLOOD PRESSURE: 62 MMHG | HEART RATE: 65 BPM | OXYGEN SATURATION: 98 % | HEIGHT: 66 IN | WEIGHT: 144.95 LBS | SYSTOLIC BLOOD PRESSURE: 112 MMHG | BODY MASS INDEX: 23.3 KG/M2 | TEMPERATURE: 97.5 F

## 2025-08-17 DIAGNOSIS — S93.402D SPRAIN OF LEFT ANKLE, UNSPECIFIED LIGAMENT, SUBSEQUENT ENCOUNTER: Primary | ICD-10-CM

## 2025-08-17 PROCEDURE — 3078F DIAST BP <80 MM HG: CPT | Performed by: NURSE PRACTITIONER

## 2025-08-17 PROCEDURE — 99213 OFFICE O/P EST LOW 20 MIN: CPT | Performed by: NURSE PRACTITIONER

## 2025-08-17 PROCEDURE — 3074F SYST BP LT 130 MM HG: CPT | Performed by: NURSE PRACTITIONER

## 2025-08-17 ASSESSMENT — VISUAL ACUITY: OU: 1

## 2025-08-17 ASSESSMENT — FIBROSIS 4 INDEX: FIB4 SCORE: 0.97
